# Patient Record
Sex: FEMALE | Race: WHITE | NOT HISPANIC OR LATINO | Employment: OTHER | URBAN - METROPOLITAN AREA
[De-identification: names, ages, dates, MRNs, and addresses within clinical notes are randomized per-mention and may not be internally consistent; named-entity substitution may affect disease eponyms.]

---

## 2018-01-03 ENCOUNTER — HOSPITAL ENCOUNTER (OUTPATIENT)
Dept: MAMMOGRAPHY | Facility: CLINIC | Age: 63
Discharge: HOME | End: 2018-01-03
Attending: OBSTETRICS & GYNECOLOGY | Admitting: OBSTETRICS & GYNECOLOGY
Payer: COMMERCIAL

## 2018-01-03 DIAGNOSIS — Z12.31 VISIT FOR SCREENING MAMMOGRAM: ICD-10-CM

## 2018-01-03 PROCEDURE — 77063 BREAST TOMOSYNTHESIS BI: CPT

## 2019-03-06 ENCOUNTER — HOSPITAL ENCOUNTER (OUTPATIENT)
Dept: MAMMOGRAPHY | Facility: CLINIC | Age: 64
Discharge: HOME | End: 2019-03-06
Attending: OBSTETRICS & GYNECOLOGY | Admitting: OBSTETRICS & GYNECOLOGY
Payer: MEDICARE

## 2019-03-06 DIAGNOSIS — Z12.31 VISIT FOR SCREENING MAMMOGRAM: ICD-10-CM

## 2019-03-06 PROCEDURE — 77063 BREAST TOMOSYNTHESIS BI: CPT

## 2019-07-31 ENCOUNTER — RECORDS - HEALTHEAST (OUTPATIENT)
Dept: ADMINISTRATIVE | Facility: OTHER | Age: 64
End: 2019-07-31

## 2019-08-01 ENCOUNTER — HOSPITAL ENCOUNTER (OUTPATIENT)
Dept: CT IMAGING | Facility: HOSPITAL | Age: 64
Discharge: HOME | End: 2019-08-01
Attending: INTERNAL MEDICINE

## 2019-08-01 DIAGNOSIS — R10.32 LLQ PAIN: ICD-10-CM

## 2019-08-01 LAB
CREAT BLD-MCNC: 0.8 MG/DL (ref 0.6–1.1)
GFR SERPL CREATININE-BSD FRML MDRD: >60 ML/MIN/1.73M2

## 2019-08-07 ENCOUNTER — RECORDS - HEALTHEAST (OUTPATIENT)
Dept: ADMINISTRATIVE | Facility: OTHER | Age: 64
End: 2019-08-07

## 2019-08-08 ENCOUNTER — HOSPITAL ENCOUNTER (OUTPATIENT)
Dept: ULTRASOUND IMAGING | Facility: HOSPITAL | Age: 64
Discharge: HOME | End: 2019-08-08
Attending: INTERNAL MEDICINE

## 2019-08-08 DIAGNOSIS — R10.32 LLQ PAIN: ICD-10-CM

## 2019-08-16 ENCOUNTER — HOSPITAL ENCOUNTER (OUTPATIENT)
Dept: MRI IMAGING | Facility: HOSPITAL | Age: 64
Discharge: HOME | End: 2019-08-16
Attending: INTERNAL MEDICINE

## 2019-08-16 DIAGNOSIS — R10.32 LLQ PAIN: ICD-10-CM

## 2019-09-16 ENCOUNTER — RECORDS - HEALTHEAST (OUTPATIENT)
Dept: ADMINISTRATIVE | Facility: OTHER | Age: 64
End: 2019-09-16

## 2019-09-16 ENCOUNTER — HOSPITAL ENCOUNTER (OUTPATIENT)
Dept: CT IMAGING | Facility: HOSPITAL | Age: 64
Discharge: HOME | End: 2019-09-16
Attending: INTERNAL MEDICINE

## 2019-09-16 DIAGNOSIS — R10.32 LLQ PAIN: ICD-10-CM

## 2019-09-16 LAB
CREAT BLD-MCNC: 0.7 MG/DL (ref 0.6–1.1)
GFR SERPL CREATININE-BSD FRML MDRD: >60 ML/MIN/1.73M2

## 2020-03-30 ENCOUNTER — RECORDS - HEALTHEAST (OUTPATIENT)
Dept: ADMINISTRATIVE | Facility: OTHER | Age: 65
End: 2020-03-30

## 2020-05-07 ENCOUNTER — COMMUNICATION - HEALTHEAST (OUTPATIENT)
Dept: INTERNAL MEDICINE | Facility: CLINIC | Age: 65
End: 2020-05-07

## 2020-05-11 ENCOUNTER — OFFICE VISIT - HEALTHEAST (OUTPATIENT)
Dept: INTERNAL MEDICINE | Facility: CLINIC | Age: 65
End: 2020-05-11

## 2020-05-11 DIAGNOSIS — M85.80 OSTEOPENIA, UNSPECIFIED LOCATION: ICD-10-CM

## 2020-05-11 DIAGNOSIS — E78.5 HYPERLIPIDEMIA LDL GOAL <130: ICD-10-CM

## 2020-05-11 ASSESSMENT — PATIENT HEALTH QUESTIONNAIRE - PHQ9: SUM OF ALL RESPONSES TO PHQ QUESTIONS 1-9: 0

## 2020-05-26 ENCOUNTER — NURSE TRIAGE (OUTPATIENT)
Dept: NURSING | Facility: CLINIC | Age: 65
End: 2020-05-26

## 2020-05-26 ENCOUNTER — COMMUNICATION - HEALTHEAST (OUTPATIENT)
Dept: SCHEDULING | Facility: CLINIC | Age: 65
End: 2020-05-26

## 2020-05-26 ENCOUNTER — OFFICE VISIT - HEALTHEAST (OUTPATIENT)
Dept: INTERNAL MEDICINE | Facility: CLINIC | Age: 65
End: 2020-05-26

## 2020-05-26 DIAGNOSIS — J02.9 SORE THROAT: ICD-10-CM

## 2020-05-26 DIAGNOSIS — R50.9 FEVER, UNSPECIFIED FEVER CAUSE: ICD-10-CM

## 2020-05-26 NOTE — TELEPHONE ENCOUNTER
"Pt states \"feeling generally ill.\"  \"Cold symptoms with body aches and sore throat.\"  \"Mild occasional chills.\"  Originally onset 5 days ago.  Fever -> \"which comes and goes.\"  Fever yesterday -> 99 (tympanic thermometer).  No fever today.  Other symptoms persist today (body aches and sore throat).    Pt requests order for PCR swab test for covid19.  Pt reports having a HE provider.  Therefore Charting is completed within HE instance of Epic.  See pt's complete triage notes in HE instance of Epic.    Tamra MEJÍA Health Nurse Advisor           "

## 2020-05-27 ENCOUNTER — AMBULATORY - HEALTHEAST (OUTPATIENT)
Dept: FAMILY MEDICINE | Facility: CLINIC | Age: 65
End: 2020-05-27

## 2020-05-27 ENCOUNTER — COMMUNICATION - HEALTHEAST (OUTPATIENT)
Dept: INTERNAL MEDICINE | Facility: CLINIC | Age: 65
End: 2020-05-27

## 2020-05-27 DIAGNOSIS — R50.9 FEVER, UNSPECIFIED FEVER CAUSE: ICD-10-CM

## 2020-05-27 DIAGNOSIS — J02.9 SORE THROAT: ICD-10-CM

## 2020-05-28 ENCOUNTER — AMBULATORY - HEALTHEAST (OUTPATIENT)
Dept: INTERNAL MEDICINE | Facility: CLINIC | Age: 65
End: 2020-05-28

## 2020-05-28 DIAGNOSIS — J02.9 SORE THROAT: ICD-10-CM

## 2020-05-29 ENCOUNTER — OFFICE VISIT - HEALTHEAST (OUTPATIENT)
Dept: FAMILY MEDICINE | Facility: CLINIC | Age: 65
End: 2020-05-29

## 2020-05-29 ENCOUNTER — COMMUNICATION - HEALTHEAST (OUTPATIENT)
Dept: FAMILY MEDICINE | Facility: CLINIC | Age: 65
End: 2020-05-29

## 2020-05-29 DIAGNOSIS — J02.9 SORE THROAT: ICD-10-CM

## 2020-05-29 DIAGNOSIS — J02.9 VIRAL PHARYNGITIS: ICD-10-CM

## 2020-05-29 LAB — DEPRECATED S PYO AG THROAT QL EIA: NORMAL

## 2020-05-30 LAB — GROUP A STREP BY PCR: NORMAL

## 2020-06-08 ENCOUNTER — COMMUNICATION - HEALTHEAST (OUTPATIENT)
Dept: INTERNAL MEDICINE | Facility: CLINIC | Age: 65
End: 2020-06-08

## 2020-06-11 ENCOUNTER — COMMUNICATION - HEALTHEAST (OUTPATIENT)
Dept: INTERNAL MEDICINE | Facility: CLINIC | Age: 65
End: 2020-06-11

## 2020-06-15 ENCOUNTER — OFFICE VISIT - HEALTHEAST (OUTPATIENT)
Dept: INTERNAL MEDICINE | Facility: CLINIC | Age: 65
End: 2020-06-15

## 2020-06-15 DIAGNOSIS — J02.9 SORE THROAT: ICD-10-CM

## 2020-06-15 ASSESSMENT — MIFFLIN-ST. JEOR: SCORE: 1265.03

## 2020-07-13 ENCOUNTER — HOSPITAL ENCOUNTER (OUTPATIENT)
Dept: MAMMOGRAPHY | Facility: CLINIC | Age: 65
Discharge: HOME | End: 2020-07-13
Attending: OBSTETRICS & GYNECOLOGY | Admitting: OBSTETRICS & GYNECOLOGY
Payer: MEDICARE

## 2020-07-13 DIAGNOSIS — Z12.31 VISIT FOR SCREENING MAMMOGRAM: ICD-10-CM

## 2020-07-13 PROCEDURE — 77063 BREAST TOMOSYNTHESIS BI: CPT

## 2020-10-05 ENCOUNTER — COMMUNICATION - HEALTHEAST (OUTPATIENT)
Dept: INTERNAL MEDICINE | Facility: CLINIC | Age: 65
End: 2020-10-05

## 2020-10-06 ENCOUNTER — OFFICE VISIT - HEALTHEAST (OUTPATIENT)
Dept: INTERNAL MEDICINE | Facility: CLINIC | Age: 65
End: 2020-10-06

## 2020-10-06 DIAGNOSIS — Z00.00 WELLNESS EXAMINATION: ICD-10-CM

## 2020-10-06 DIAGNOSIS — Z23 NEEDS FLU SHOT: ICD-10-CM

## 2020-10-06 DIAGNOSIS — E78.5 HYPERLIPIDEMIA LDL GOAL <130: ICD-10-CM

## 2020-10-06 DIAGNOSIS — Z78.0 MENOPAUSE: ICD-10-CM

## 2020-10-06 DIAGNOSIS — M79.10 MYALGIA: ICD-10-CM

## 2020-10-06 DIAGNOSIS — L98.9 SKIN LESION: ICD-10-CM

## 2020-10-06 DIAGNOSIS — R63.5 WEIGHT GAIN: ICD-10-CM

## 2020-10-06 LAB
ALBUMIN SERPL-MCNC: 4.6 G/DL (ref 3.5–5)
ALP SERPL-CCNC: 73 U/L (ref 45–120)
ALT SERPL W P-5'-P-CCNC: 15 U/L (ref 0–45)
ANION GAP SERPL CALCULATED.3IONS-SCNC: 7 MMOL/L (ref 5–18)
AST SERPL W P-5'-P-CCNC: 20 U/L (ref 0–40)
BILIRUB SERPL-MCNC: 0.4 MG/DL (ref 0–1)
BUN SERPL-MCNC: 15 MG/DL (ref 8–22)
C REACTIVE PROTEIN LHE: 0.1 MG/DL (ref 0–0.8)
CALCIUM SERPL-MCNC: 9.6 MG/DL (ref 8.5–10.5)
CHLORIDE BLD-SCNC: 103 MMOL/L (ref 98–107)
CHOLEST SERPL-MCNC: 205 MG/DL
CK SERPL-CCNC: 106 U/L (ref 30–190)
CO2 SERPL-SCNC: 29 MMOL/L (ref 22–31)
CREAT SERPL-MCNC: 0.8 MG/DL (ref 0.6–1.1)
ERYTHROCYTE [DISTWIDTH] IN BLOOD BY AUTOMATED COUNT: 13.1 % (ref 11–14.5)
ERYTHROCYTE [SEDIMENTATION RATE] IN BLOOD BY WESTERGREN METHOD: 14 MM/HR (ref 0–20)
FASTING STATUS PATIENT QL REPORTED: YES
GFR SERPL CREATININE-BSD FRML MDRD: >60 ML/MIN/1.73M2
GLUCOSE BLD-MCNC: 106 MG/DL (ref 70–125)
HCT VFR BLD AUTO: 41.6 % (ref 35–47)
HDLC SERPL-MCNC: 59 MG/DL
HGB BLD-MCNC: 13.9 G/DL (ref 12–16)
LDLC SERPL CALC-MCNC: 127 MG/DL
MCH RBC QN AUTO: 30 PG (ref 27–34)
MCHC RBC AUTO-ENTMCNC: 33.3 G/DL (ref 32–36)
MCV RBC AUTO: 90 FL (ref 80–100)
PLATELET # BLD AUTO: 224 THOU/UL (ref 140–440)
PMV BLD AUTO: 7.6 FL (ref 7–10)
POTASSIUM BLD-SCNC: 4.2 MMOL/L (ref 3.5–5)
PROT SERPL-MCNC: 7.5 G/DL (ref 6–8)
RBC # BLD AUTO: 4.63 MILL/UL (ref 3.8–5.4)
SODIUM SERPL-SCNC: 139 MMOL/L (ref 136–145)
TRIGL SERPL-MCNC: 96 MG/DL
TSH SERPL DL<=0.005 MIU/L-ACNC: 3.05 UIU/ML (ref 0.3–5)
WBC: 4.5 THOU/UL (ref 4–11)

## 2020-10-06 ASSESSMENT — PATIENT HEALTH QUESTIONNAIRE - PHQ9: SUM OF ALL RESPONSES TO PHQ QUESTIONS 1-9: 0

## 2020-10-06 ASSESSMENT — MIFFLIN-ST. JEOR: SCORE: 1255.96

## 2020-10-07 ENCOUNTER — OFFICE VISIT (OUTPATIENT)
Dept: URGENT CARE | Facility: URGENT CARE | Age: 65
End: 2020-10-07
Payer: MEDICARE

## 2020-10-07 ENCOUNTER — COMMUNICATION - HEALTHEAST (OUTPATIENT)
Dept: SCHEDULING | Facility: CLINIC | Age: 65
End: 2020-10-07

## 2020-10-07 VITALS
OXYGEN SATURATION: 98 % | WEIGHT: 153 LBS | DIASTOLIC BLOOD PRESSURE: 76 MMHG | TEMPERATURE: 97.9 F | HEART RATE: 64 BPM | SYSTOLIC BLOOD PRESSURE: 125 MMHG

## 2020-10-07 DIAGNOSIS — L03.114 CELLULITIS OF LEFT UPPER ARM: Primary | ICD-10-CM

## 2020-10-07 PROCEDURE — 99203 OFFICE O/P NEW LOW 30 MIN: CPT | Performed by: INTERNAL MEDICINE

## 2020-10-07 RX ORDER — CEPHALEXIN 500 MG/1
500 CAPSULE ORAL 3 TIMES DAILY
Qty: 21 CAPSULE | Refills: 0 | Status: SHIPPED | OUTPATIENT
Start: 2020-10-07 | End: 2021-07-18

## 2020-10-07 RX ORDER — LATANOPROST 50 UG/ML
SOLUTION/ DROPS OPHTHALMIC
COMMUNITY
Start: 2020-08-28

## 2020-10-07 RX ORDER — VALACYCLOVIR HYDROCHLORIDE 500 MG/1
TABLET, FILM COATED ORAL
COMMUNITY
Start: 2020-07-22 | End: 2021-07-14

## 2020-10-07 RX ORDER — CYANOCOBALAMIN 1000 UG/ML
1000 INJECTION, SOLUTION INTRAMUSCULAR; SUBCUTANEOUS
COMMUNITY
Start: 2020-03-30 | End: 2022-07-07

## 2020-10-07 RX ORDER — ESTRADIOL 0.1 MG/G
2 CREAM VAGINAL
COMMUNITY
Start: 2020-03-30 | End: 2021-07-14

## 2020-10-07 RX ORDER — CEPHALEXIN 500 MG/1
500 CAPSULE ORAL 3 TIMES DAILY
Qty: 21 CAPSULE | Refills: 0 | Status: SHIPPED | OUTPATIENT
Start: 2020-10-07 | End: 2020-10-07

## 2020-10-07 RX ORDER — PRAVASTATIN SODIUM 20 MG
20 TABLET ORAL
COMMUNITY
Start: 2020-04-02 | End: 2022-05-03

## 2020-10-07 ASSESSMENT — ENCOUNTER SYMPTOMS
MYALGIAS: 0
HEADACHES: 0

## 2020-10-08 ENCOUNTER — COMMUNICATION - HEALTHEAST (OUTPATIENT)
Dept: SCHEDULING | Facility: CLINIC | Age: 65
End: 2020-10-08

## 2020-10-08 ENCOUNTER — OFFICE VISIT - HEALTHEAST (OUTPATIENT)
Dept: INTERNAL MEDICINE | Facility: CLINIC | Age: 65
End: 2020-10-08

## 2020-10-08 ENCOUNTER — AMBULATORY - HEALTHEAST (OUTPATIENT)
Dept: INTERNAL MEDICINE | Facility: CLINIC | Age: 65
End: 2020-10-08

## 2020-10-08 ENCOUNTER — COMMUNICATION - HEALTHEAST (OUTPATIENT)
Dept: INTERNAL MEDICINE | Facility: CLINIC | Age: 65
End: 2020-10-08

## 2020-10-08 DIAGNOSIS — I82.602 THROMBOSIS OF LEFT UPPER EXTREMITY: ICD-10-CM

## 2020-10-08 DIAGNOSIS — R22.32 LOCALIZED SWELLING, MASS AND LUMP, LEFT UPPER LIMB: ICD-10-CM

## 2020-10-08 NOTE — PROGRESS NOTES
"SUBJECTIVE:   Francisca Williamson is a 65 year old female presenting with a chief complaint of   Chief Complaint   Patient presents with     Urgent Care     Allergic Reaction     reaction to flu shot       She is a new patient of [facility].        Onset of symptoms was last night  Course of illness is worsening.    Location: left forearm  Context: had flu shot yesterday at physical  Called nurse line tonight and recommended evaluation.  Current and Associated symptoms: redness, warmth, swelling and pain  Treatment measures tried include: tylenol  *    Review of Systems   Constitutional: Fever: 99.   HENT:        Getting over a cold   Musculoskeletal: Negative for myalgias.   Neurological: Negative for headaches.       Past Medical History:   Diagnosis Date     High cholesterol      No family history on file.  Current Outpatient Medications   Medication Sig Dispense Refill     B-D TB SYRINGE 25G X 5/8\" 1 ML MISC FOR HOME USE       cephALEXin (KEFLEX) 500 MG capsule Take 1 capsule (500 mg) by mouth 3 times daily 21 capsule 0     cyanocobalamin (CYANOCOBALAMIN) 1000 MCG/ML injection Inject 1,000 mcg into the muscle       estradiol (ESTRACE) 0.1 MG/GM vaginal cream Place 2 g vaginally       latanoprost (XALATAN) 0.005 % ophthalmic solution INT 1 GTT IN EACH EYE ONCE D       pravastatin (PRAVACHOL) 20 MG tablet Take 20 mg by mouth       valACYclovir (VALTREX) 500 MG tablet        Social History     Tobacco Use     Smoking status: Never Smoker     Smokeless tobacco: Never Used   Substance Use Topics     Alcohol use: Not on file       OBJECTIVE  /76   Pulse 64   Temp 97.9  F (36.6  C) (Tympanic)   Wt 69.4 kg (153 lb)   SpO2 98%     Physical Exam  Vitals signs reviewed.   Constitutional:       Appearance: Normal appearance. She is not ill-appearing, toxic-appearing or diaphoretic.   Skin:     Comments: right arm    Red area noted    16 x 7 cm  Superior area more light red and transitions to deep red 1/2 way down with " more prominent swelling.  Warm to touch   Neurological:      Mental Status: She is alert.                 Labs:  No results found for this or any previous visit (from the past 24 hour(s)).        ASSESSMENT:      ICD-10-CM    1. Cellulitis of left upper arm  L03.114 cephALEXin (KEFLEX) 500 MG capsule     DISCONTINUED: cephALEXin (KEFLEX) 500 MG capsule        PLAN:  Keflex antibiotics 500 mg 3 times a day for 1 week  Recommended probiotics  Elevate  Limit use of arm      Followup:    If not improving or if condition worsens, follow up with your Primary Care Provider, 1 week    Patient Instructions   If worse, spreading or other concerns, may need IV antibiotics / ER visit.        Patient Education     Cellulitis  Cellulitis is an infection of the deep layers of skin. A break in the skin, such as a cut or scratch, can let bacteria under the skin. If the bacteria get to deep layers of the skin, it can be serious. If not treated, cellulitis can get into the bloodstream and lymph nodes. The infection can then spread throughout the body. This causes serious illness.  Cellulitis causes the affected skin to become red, swollen, warm, and sore. The reddened areas have a visible border. An open sore may leak fluid (pus). You may have a fever, chills, and pain.  Cellulitis is treated with antibiotics taken for 7 to 10 days. An open sore may be cleaned and covered with cool wet gauze. Symptoms should get better 1 to 2 days after treatment is started. Make sure to take all the antibiotics for the full number of days until they are gone. Keep taking the medicine even if your symptoms go away.  Home care  Follow these tips:    Limit the use of the part of your body with cellulitis.     If the infection is on your leg, keep your leg raised while sitting. This will help to reduce swelling.    Take all of the antibiotic medicine exactly as directed until it is gone. Do not miss any doses, especially during the first 7 days. Don t  stop taking the medicine when your symptoms get better.    Keep the affected area clean and dry.    Wash your hands with soap and warm water before and after touching your skin. Anyone else who touches your skin should also wash his or her hands. Don't share towels.  Follow-up care  Follow up with your healthcare provider, or as advised. If your infection does not go away on the first antibiotic, your healthcare provider will prescribe a different one.  When to seek medical advice  Call your healthcare provider right away if any of these occur:    Red areas that spread    Swelling or pain that gets worse    Fluid leaking from the skin (pus)    Fever higher of 100.4  F (38.0  C) or higher after 2 days on antibiotics  Date Last Reviewed: 9/1/2016 2000-2019 [employer]. [address] [place] [address], [place] [address]. All rights reserved. This information is not intended as a substitute for professional medical care. Always follow your healthcare professional's instructions.

## 2020-10-08 NOTE — PATIENT INSTRUCTIONS
If worse, spreading or other concerns, may need IV antibiotics / ER visit.        Patient Education     Cellulitis  Cellulitis is an infection of the deep layers of skin. A break in the skin, such as a cut or scratch, can let bacteria under the skin. If the bacteria get to deep layers of the skin, it can be serious. If not treated, cellulitis can get into the bloodstream and lymph nodes. The infection can then spread throughout the body. This causes serious illness.  Cellulitis causes the affected skin to become red, swollen, warm, and sore. The reddened areas have a visible border. An open sore may leak fluid (pus). You may have a fever, chills, and pain.  Cellulitis is treated with antibiotics taken for 7 to 10 days. An open sore may be cleaned and covered with cool wet gauze. Symptoms should get better 1 to 2 days after treatment is started. Make sure to take all the antibiotics for the full number of days until they are gone. Keep taking the medicine even if your symptoms go away.  Home care  Follow these tips:    Limit the use of the part of your body with cellulitis.     If the infection is on your leg, keep your leg raised while sitting. This will help to reduce swelling.    Take all of the antibiotic medicine exactly as directed until it is gone. Do not miss any doses, especially during the first 7 days. Don t stop taking the medicine when your symptoms get better.    Keep the affected area clean and dry.    Wash your hands with soap and warm water before and after touching your skin. Anyone else who touches your skin should also wash his or her hands. Don't share towels.  Follow-up care  Follow up with your healthcare provider, or as advised. If your infection does not go away on the first antibiotic, your healthcare provider will prescribe a different one.  When to seek medical advice  Call your healthcare provider right away if any of these occur:    Red areas that spread    Swelling or pain that gets  worse    Fluid leaking from the skin (pus)    Fever higher of 100.4  F (38.0  C) or higher after 2 days on antibiotics  Date Last Reviewed: 9/1/2016 2000-2019 [employer]. [address] [place] [address], [place] [address]. All rights reserved. This information is not intended as a substitute for professional medical care. Always follow your healthcare professional's instructions.

## 2020-10-09 ENCOUNTER — COMMUNICATION - HEALTHEAST (OUTPATIENT)
Dept: SCHEDULING | Facility: CLINIC | Age: 65
End: 2020-10-09

## 2020-10-09 ENCOUNTER — COMMUNICATION - HEALTHEAST (OUTPATIENT)
Dept: INTERNAL MEDICINE | Facility: CLINIC | Age: 65
End: 2020-10-09

## 2020-10-09 ENCOUNTER — OFFICE VISIT - HEALTHEAST (OUTPATIENT)
Dept: INTERNAL MEDICINE | Facility: CLINIC | Age: 65
End: 2020-10-09

## 2020-10-09 DIAGNOSIS — I82.602 THROMBOSIS OF LEFT UPPER EXTREMITY: ICD-10-CM

## 2020-10-09 DIAGNOSIS — L03.90 CELLULITIS, UNSPECIFIED CELLULITIS SITE: ICD-10-CM

## 2020-10-09 ASSESSMENT — MIFFLIN-ST. JEOR: SCORE: 1255.96

## 2020-10-12 ENCOUNTER — COMMUNICATION - HEALTHEAST (OUTPATIENT)
Dept: INTERNAL MEDICINE | Facility: CLINIC | Age: 65
End: 2020-10-12

## 2020-10-15 ENCOUNTER — COMMUNICATION - HEALTHEAST (OUTPATIENT)
Dept: INTERNAL MEDICINE | Facility: CLINIC | Age: 65
End: 2020-10-15

## 2020-10-25 ENCOUNTER — COMMUNICATION - HEALTHEAST (OUTPATIENT)
Dept: INTERNAL MEDICINE | Facility: CLINIC | Age: 65
End: 2020-10-25

## 2020-10-26 ENCOUNTER — RECORDS - HEALTHEAST (OUTPATIENT)
Dept: ADMINISTRATIVE | Facility: OTHER | Age: 65
End: 2020-10-26

## 2020-10-27 ENCOUNTER — COMMUNICATION - HEALTHEAST (OUTPATIENT)
Dept: SCHEDULING | Facility: CLINIC | Age: 65
End: 2020-10-27

## 2020-10-27 DIAGNOSIS — R52 BODY ACHES: ICD-10-CM

## 2020-10-27 DIAGNOSIS — R09.81 CONGESTION OF PARANASAL SINUS: ICD-10-CM

## 2020-10-30 ENCOUNTER — AMBULATORY - HEALTHEAST (OUTPATIENT)
Dept: FAMILY MEDICINE | Facility: CLINIC | Age: 65
End: 2020-10-30

## 2020-10-30 DIAGNOSIS — R09.81 CONGESTION OF PARANASAL SINUS: ICD-10-CM

## 2020-10-30 DIAGNOSIS — R52 BODY ACHES: ICD-10-CM

## 2020-11-01 ENCOUNTER — COMMUNICATION - HEALTHEAST (OUTPATIENT)
Dept: SCHEDULING | Facility: CLINIC | Age: 65
End: 2020-11-01

## 2020-11-12 ENCOUNTER — COMMUNICATION - HEALTHEAST (OUTPATIENT)
Dept: INTERNAL MEDICINE | Facility: CLINIC | Age: 65
End: 2020-11-12

## 2020-11-16 ENCOUNTER — COMMUNICATION - HEALTHEAST (OUTPATIENT)
Dept: INTERNAL MEDICINE | Facility: CLINIC | Age: 65
End: 2020-11-16

## 2020-11-19 ENCOUNTER — OFFICE VISIT - HEALTHEAST (OUTPATIENT)
Dept: INTERNAL MEDICINE | Facility: CLINIC | Age: 65
End: 2020-11-19

## 2020-11-19 DIAGNOSIS — K57.32 DIVERTICULITIS OF COLON: ICD-10-CM

## 2020-11-19 ASSESSMENT — MIFFLIN-ST. JEOR: SCORE: 1278.64

## 2020-11-20 ENCOUNTER — HOSPITAL ENCOUNTER (OUTPATIENT)
Dept: CARDIOLOGY | Facility: HOSPITAL | Age: 65
Discharge: HOME | End: 2020-11-20

## 2020-11-20 DIAGNOSIS — R07.9 CHEST PAIN, UNSPECIFIED TYPE: ICD-10-CM

## 2020-11-20 LAB
CV STRESS CURRENT BP HE: NORMAL
CV STRESS CURRENT HR HE: 119
CV STRESS CURRENT HR HE: 124
CV STRESS CURRENT HR HE: 129
CV STRESS CURRENT HR HE: 130
CV STRESS CURRENT HR HE: 133
CV STRESS CURRENT HR HE: 134
CV STRESS CURRENT HR HE: 137
CV STRESS CURRENT HR HE: 141
CV STRESS CURRENT HR HE: 142
CV STRESS CURRENT HR HE: 75
CV STRESS CURRENT HR HE: 76
CV STRESS CURRENT HR HE: 78
CV STRESS CURRENT HR HE: 81
CV STRESS CURRENT HR HE: 85
CV STRESS CURRENT HR HE: 86
CV STRESS CURRENT HR HE: 87
CV STRESS CURRENT HR HE: 91
CV STRESS CURRENT HR HE: 91
CV STRESS CURRENT HR HE: 92
CV STRESS DEVIATION TIME HE: NORMAL
CV STRESS ECHO PERCENT HR HE: NORMAL
CV STRESS EXERCISE STAGE HE: NORMAL
CV STRESS FINAL RESTING BP HE: NORMAL
CV STRESS FINAL RESTING HR HE: 87
CV STRESS MAX HR HE: 141
CV STRESS MAX TREADMILL GRADE HE: 12
CV STRESS MAX TREADMILL SPEED HE: 2.5
CV STRESS PEAK DIA BP HE: NORMAL
CV STRESS PEAK SYS BP HE: NORMAL
CV STRESS PHASE HE: NORMAL
CV STRESS PROTOCOL HE: NORMAL
CV STRESS RESTING PT POSITION HE: NORMAL
CV STRESS RESTING PT POSITION HE: NORMAL
CV STRESS ST DEVIATION AMOUNT HE: NORMAL
CV STRESS ST DEVIATION ELEVATION HE: NORMAL
CV STRESS ST EVELATION AMOUNT HE: NORMAL
CV STRESS TEST TYPE HE: NORMAL
CV STRESS TOTAL STAGE TIME MIN 1 HE: NORMAL
ECHO EJECTION FRACTION ESTIMATED: 65 %
RATE PRESSURE PRODUCT: NORMAL
STRESS ECHO BASELINE DIASTOLIC HE: 76
STRESS ECHO BASELINE HR: 85
STRESS ECHO BASELINE SYSTOLIC BP: 120
STRESS ECHO CALCULATED PERCENT HR: 92 %
STRESS ECHO LAST STRESS DIASTOLIC BP: 70
STRESS ECHO LAST STRESS HR: 142
STRESS ECHO LAST STRESS SYSTOLIC BP: 152
STRESS ECHO POST ESTIMATED WORKLOAD: 7.1
STRESS ECHO POST EXERCISE DUR MIN: 5
STRESS ECHO POST EXERCISE DUR SEC: 58
STRESS ECHO TARGET HR: 153

## 2020-11-24 ENCOUNTER — COMMUNICATION - HEALTHEAST (OUTPATIENT)
Dept: INTERNAL MEDICINE | Facility: CLINIC | Age: 65
End: 2020-11-24

## 2020-12-17 ENCOUNTER — COMMUNICATION - HEALTHEAST (OUTPATIENT)
Dept: INTERNAL MEDICINE | Facility: CLINIC | Age: 65
End: 2020-12-17

## 2020-12-17 DIAGNOSIS — K90.0 CELIAC DISEASE/SPRUE: ICD-10-CM

## 2020-12-18 ENCOUNTER — COMMUNICATION - HEALTHEAST (OUTPATIENT)
Dept: INTERNAL MEDICINE | Facility: CLINIC | Age: 65
End: 2020-12-18

## 2020-12-18 DIAGNOSIS — R10.84 ABDOMINAL PAIN, GENERALIZED: ICD-10-CM

## 2020-12-21 ENCOUNTER — HOSPITAL ENCOUNTER (OUTPATIENT)
Dept: CT IMAGING | Facility: HOSPITAL | Age: 65
Discharge: HOME | End: 2020-12-21

## 2020-12-21 ENCOUNTER — AMBULATORY - HEALTHEAST (OUTPATIENT)
Dept: LAB | Facility: HOSPITAL | Age: 65
End: 2020-12-21

## 2020-12-21 DIAGNOSIS — R10.84 ABDOMINAL PAIN, GENERALIZED: ICD-10-CM

## 2020-12-21 LAB
ALBUMIN SERPL-MCNC: 4.4 G/DL (ref 3.5–5)
ALBUMIN UR-MCNC: NEGATIVE MG/DL
ALP SERPL-CCNC: 69 U/L (ref 45–120)
ALT SERPL W P-5'-P-CCNC: 20 U/L (ref 0–45)
AMYLASE SERPL-CCNC: 42 U/L (ref 5–120)
ANION GAP SERPL CALCULATED.3IONS-SCNC: 7 MMOL/L (ref 5–18)
APPEARANCE UR: CLEAR
AST SERPL W P-5'-P-CCNC: 26 U/L (ref 0–40)
BILIRUB DIRECT SERPL-MCNC: <0.1 MG/DL
BILIRUB SERPL-MCNC: 0.3 MG/DL (ref 0–1)
BILIRUB UR QL STRIP: NEGATIVE
BUN SERPL-MCNC: 13 MG/DL (ref 8–22)
CALCIUM SERPL-MCNC: 9.1 MG/DL (ref 8.5–10.5)
CHLORIDE BLD-SCNC: 103 MMOL/L (ref 98–107)
CO2 SERPL-SCNC: 26 MMOL/L (ref 22–31)
COLOR UR AUTO: COLORLESS
CREAT SERPL-MCNC: 0.82 MG/DL (ref 0.6–1.1)
ERYTHROCYTE [DISTWIDTH] IN BLOOD BY AUTOMATED COUNT: 13.4 % (ref 11–14.5)
GFR SERPL CREATININE-BSD FRML MDRD: >60 ML/MIN/1.73M2
GLUCOSE BLD-MCNC: 149 MG/DL (ref 70–125)
GLUCOSE UR STRIP-MCNC: NEGATIVE MG/DL
HCT VFR BLD AUTO: 40.1 % (ref 35–47)
HGB BLD-MCNC: 12.9 G/DL (ref 12–16)
HGB UR QL STRIP: NEGATIVE
KETONES UR STRIP-MCNC: NEGATIVE MG/DL
LEUKOCYTE ESTERASE UR QL STRIP: NEGATIVE
LIPASE SERPL-CCNC: 48 U/L (ref 0–52)
MCH RBC QN AUTO: 29 PG (ref 27–34)
MCHC RBC AUTO-ENTMCNC: 32.2 G/DL (ref 32–36)
MCV RBC AUTO: 90 FL (ref 80–100)
NITRATE UR QL: NEGATIVE
PH UR STRIP: 6 [PH] (ref 4.5–8)
PLATELET # BLD AUTO: 239 THOU/UL (ref 140–440)
PMV BLD AUTO: 9.5 FL (ref 8.5–12.5)
POTASSIUM BLD-SCNC: 3.8 MMOL/L (ref 3.5–5)
PROT SERPL-MCNC: 7.7 G/DL (ref 6–8)
RBC # BLD AUTO: 4.45 MILL/UL (ref 3.8–5.4)
SODIUM SERPL-SCNC: 136 MMOL/L (ref 136–145)
SP GR UR STRIP: 1 (ref 1–1.03)
UROBILINOGEN UR STRIP-ACNC: NORMAL
WBC: 7.3 THOU/UL (ref 4–11)

## 2020-12-22 ENCOUNTER — COMMUNICATION - HEALTHEAST (OUTPATIENT)
Dept: INTERNAL MEDICINE | Facility: CLINIC | Age: 65
End: 2020-12-22

## 2020-12-25 ENCOUNTER — OFFICE VISIT - HEALTHEAST (OUTPATIENT)
Dept: FAMILY MEDICINE | Facility: CLINIC | Age: 65
End: 2020-12-25

## 2020-12-25 DIAGNOSIS — R30.0 BURNING WITH URINATION: ICD-10-CM

## 2020-12-25 DIAGNOSIS — R39.9 UTI SYMPTOMS: ICD-10-CM

## 2020-12-25 LAB
ALBUMIN UR-MCNC: NEGATIVE MG/DL
APPEARANCE UR: CLEAR
BACTERIA #/AREA URNS HPF: ABNORMAL HPF
BILIRUB UR QL STRIP: NEGATIVE
CLUE CELLS: NORMAL
COLOR UR AUTO: YELLOW
GLUCOSE UR STRIP-MCNC: NEGATIVE MG/DL
HGB UR QL STRIP: ABNORMAL
KETONES UR STRIP-MCNC: NEGATIVE MG/DL
LEUKOCYTE ESTERASE UR QL STRIP: NEGATIVE
NITRATE UR QL: NEGATIVE
PH UR STRIP: 5.5 [PH] (ref 5–8)
RBC #/AREA URNS AUTO: ABNORMAL HPF
SP GR UR STRIP: 1.01 (ref 1–1.03)
SQUAMOUS #/AREA URNS AUTO: ABNORMAL LPF
TRICHOMONAS, WET PREP: NORMAL
UROBILINOGEN UR STRIP-ACNC: ABNORMAL
WBC #/AREA URNS AUTO: ABNORMAL HPF
YEAST, WET PREP: NORMAL

## 2021-01-12 ENCOUNTER — RECORDS - HEALTHEAST (OUTPATIENT)
Dept: ADMINISTRATIVE | Facility: OTHER | Age: 66
End: 2021-01-12

## 2021-01-13 ENCOUNTER — RECORDS - HEALTHEAST (OUTPATIENT)
Dept: ADMINISTRATIVE | Facility: OTHER | Age: 66
End: 2021-01-13

## 2021-01-18 ENCOUNTER — COMMUNICATION - HEALTHEAST (OUTPATIENT)
Dept: INTERNAL MEDICINE | Facility: CLINIC | Age: 66
End: 2021-01-18

## 2021-01-26 ENCOUNTER — RECORDS - HEALTHEAST (OUTPATIENT)
Dept: ADMINISTRATIVE | Facility: OTHER | Age: 66
End: 2021-01-26

## 2021-01-26 ENCOUNTER — RECORDS - HEALTHEAST (OUTPATIENT)
Dept: BONE DENSITY | Facility: CLINIC | Age: 66
End: 2021-01-26

## 2021-01-26 DIAGNOSIS — Z78.0 ASYMPTOMATIC MENOPAUSAL STATE: ICD-10-CM

## 2021-01-27 ENCOUNTER — COMMUNICATION - HEALTHEAST (OUTPATIENT)
Dept: INTERNAL MEDICINE | Facility: CLINIC | Age: 66
End: 2021-01-27

## 2021-01-28 ENCOUNTER — COMMUNICATION - HEALTHEAST (OUTPATIENT)
Dept: INTERNAL MEDICINE | Facility: CLINIC | Age: 66
End: 2021-01-28

## 2021-01-28 ENCOUNTER — RECORDS - HEALTHEAST (OUTPATIENT)
Dept: ADMINISTRATIVE | Facility: OTHER | Age: 66
End: 2021-01-28

## 2021-02-17 ENCOUNTER — RECORDS - HEALTHEAST (OUTPATIENT)
Dept: ADMINISTRATIVE | Facility: OTHER | Age: 66
End: 2021-02-17

## 2021-02-18 ENCOUNTER — COMMUNICATION - HEALTHEAST (OUTPATIENT)
Dept: INTERNAL MEDICINE | Facility: CLINIC | Age: 66
End: 2021-02-18

## 2021-03-08 ENCOUNTER — AMBULATORY - HEALTHEAST (OUTPATIENT)
Dept: NURSING | Facility: CLINIC | Age: 66
End: 2021-03-08

## 2021-03-29 ENCOUNTER — AMBULATORY - HEALTHEAST (OUTPATIENT)
Dept: NURSING | Facility: CLINIC | Age: 66
End: 2021-03-29

## 2021-04-08 ENCOUNTER — OFFICE VISIT - HEALTHEAST (OUTPATIENT)
Dept: FAMILY MEDICINE | Facility: CLINIC | Age: 66
End: 2021-04-08

## 2021-04-08 DIAGNOSIS — Z88.1 ALLERGY TO MULTIPLE ANTIBIOTICS: ICD-10-CM

## 2021-04-08 DIAGNOSIS — K90.0 CELIAC DISEASE/SPRUE: ICD-10-CM

## 2021-04-08 DIAGNOSIS — Z86.0100 HISTORY OF COLONIC POLYPS: ICD-10-CM

## 2021-04-08 DIAGNOSIS — M81.0 OSTEOPOROSIS, UNSPECIFIED OSTEOPOROSIS TYPE, UNSPECIFIED PATHOLOGICAL FRACTURE PRESENCE: ICD-10-CM

## 2021-04-08 DIAGNOSIS — K57.30 DIVERTICULOSIS OF LARGE INTESTINE WITHOUT HEMORRHAGE: ICD-10-CM

## 2021-04-08 DIAGNOSIS — E78.5 HYPERLIPIDEMIA LDL GOAL <130: ICD-10-CM

## 2021-04-08 DIAGNOSIS — E53.8 B12 DEFICIENCY: ICD-10-CM

## 2021-04-22 ENCOUNTER — RECORDS - HEALTHEAST (OUTPATIENT)
Dept: ADMINISTRATIVE | Facility: OTHER | Age: 66
End: 2021-04-22

## 2021-05-03 ENCOUNTER — OFFICE VISIT - HEALTHEAST (OUTPATIENT)
Dept: ALLERGY | Facility: CLINIC | Age: 66
End: 2021-05-03

## 2021-05-03 DIAGNOSIS — Z88.1 ALLERGY TO MULTIPLE ANTIBIOTICS: ICD-10-CM

## 2021-05-07 ENCOUNTER — AMBULATORY - HEALTHEAST (OUTPATIENT)
Dept: ALLERGY | Facility: CLINIC | Age: 66
End: 2021-05-07

## 2021-05-07 DIAGNOSIS — Z88.9 DRUG ALLERGY: ICD-10-CM

## 2021-05-13 ENCOUNTER — RECORDS - HEALTHEAST (OUTPATIENT)
Dept: ADMINISTRATIVE | Facility: OTHER | Age: 66
End: 2021-05-13

## 2021-05-13 ENCOUNTER — TRANSFERRED RECORDS (OUTPATIENT)
Dept: PHYSICAL THERAPY | Facility: CLINIC | Age: 66
End: 2021-05-13

## 2021-05-26 ASSESSMENT — PATIENT HEALTH QUESTIONNAIRE - PHQ9: SUM OF ALL RESPONSES TO PHQ QUESTIONS 1-9: 0

## 2021-05-27 VITALS
SYSTOLIC BLOOD PRESSURE: 108 MMHG | DIASTOLIC BLOOD PRESSURE: 70 MMHG | HEART RATE: 78 BPM | OXYGEN SATURATION: 97 % | TEMPERATURE: 98.4 F

## 2021-05-27 VITALS — TEMPERATURE: 98.6 F

## 2021-05-27 ASSESSMENT — PATIENT HEALTH QUESTIONNAIRE - PHQ9: SUM OF ALL RESPONSES TO PHQ QUESTIONS 1-9: 0

## 2021-06-04 VITALS
WEIGHT: 154.25 LBS | SYSTOLIC BLOOD PRESSURE: 122 MMHG | HEART RATE: 74 BPM | DIASTOLIC BLOOD PRESSURE: 81 MMHG | BODY MASS INDEX: 25.28 KG/M2 | OXYGEN SATURATION: 98 % | RESPIRATION RATE: 16 BRPM | TEMPERATURE: 97.8 F

## 2021-06-04 VITALS
BODY MASS INDEX: 25.55 KG/M2 | HEART RATE: 72 BPM | OXYGEN SATURATION: 97 % | DIASTOLIC BLOOD PRESSURE: 80 MMHG | SYSTOLIC BLOOD PRESSURE: 120 MMHG | TEMPERATURE: 98.2 F | WEIGHT: 159 LBS | HEIGHT: 66 IN

## 2021-06-05 VITALS
OXYGEN SATURATION: 97 % | TEMPERATURE: 97.4 F | DIASTOLIC BLOOD PRESSURE: 79 MMHG | SYSTOLIC BLOOD PRESSURE: 135 MMHG | HEART RATE: 74 BPM | BODY MASS INDEX: 25.73 KG/M2 | WEIGHT: 157 LBS

## 2021-06-05 VITALS
HEIGHT: 66 IN | WEIGHT: 162 LBS | HEART RATE: 78 BPM | OXYGEN SATURATION: 99 % | DIASTOLIC BLOOD PRESSURE: 70 MMHG | BODY MASS INDEX: 26.03 KG/M2 | SYSTOLIC BLOOD PRESSURE: 102 MMHG

## 2021-06-05 VITALS
HEART RATE: 73 BPM | SYSTOLIC BLOOD PRESSURE: 112 MMHG | WEIGHT: 157 LBS | OXYGEN SATURATION: 97 % | BODY MASS INDEX: 25.23 KG/M2 | DIASTOLIC BLOOD PRESSURE: 74 MMHG | HEIGHT: 66 IN

## 2021-06-05 VITALS
BODY MASS INDEX: 25.23 KG/M2 | OXYGEN SATURATION: 97 % | WEIGHT: 157 LBS | HEART RATE: 78 BPM | DIASTOLIC BLOOD PRESSURE: 70 MMHG | SYSTOLIC BLOOD PRESSURE: 130 MMHG | HEIGHT: 66 IN

## 2021-06-08 NOTE — TELEPHONE ENCOUNTER
Dr. Lewis,  Spoke with the patient and relayed message below from Dr. Mckeon.  Patient states that you had verbally told her to get a strep test, but one was not ordered.  She did get her Covid test today, but would be okay with going in again for a strep test if you would like it done.    Please advise.  Thank you.  Myranda CASTELAN CMA/MICHAEL....................4:02 PM

## 2021-06-08 NOTE — TELEPHONE ENCOUNTER
Left message to call back for: Francisca  Information to relay to patient:  Dr Li would like to move pt's appt up to May to establish care by telephone or video visit. Thanks.

## 2021-06-08 NOTE — TELEPHONE ENCOUNTER
"Pt states \"feeling generally ill.\"  Onset 5 days ago.    \"Cold symptoms with body aches and sore throat.\"  \"Mild occasional chills.\"  Fever -> \"comes and goes.\"  Fever yesterday -> 99 (tympanic thermometer).  No fever today.  Other symptoms persist today -> body aches and sore throat.    Pt would like PCR covid swab test ordered, due to high-risk household members.  Pt prefers telephone provider visit to discuss her request for PCR test order.  Warm transferred to a  for this purpose now.    Tamra Marshall  [facility] Nurse Advisor     Reason for Disposition    [1] COVID-19 infection diagnosed or suspected AND [2] mild symptoms (fever, cough) AND [3] no trouble breathing or other complications    [facility] Specific Disposition  - REQUIRED: [facility] Specific Patient Instructions  COVID 19 Nurse Triage Plan/Patient Instructions    Please be aware that novel coronavirus (COVID-19) may be circulating in the community. If you develop symptoms such as fever, cough, or SOB or if you have concerns about the presence of another infection including coronavirus (COVID-19), please contact your health care provider or visit [email or web address].       Patient to have scheduled Telephone Visit with a provider. Follow System Ambulatory Workflow for COVID 19.     The clinic staff will assist you to schedule an appointment to complete the Telephone Visit with a provider during normal clinic hours.       Call Back If: Your symptoms worsen before you are able to complete your Telephone Visit with a provider.      Thank you for limiting contact with others, wearing a simple mask to cover your cough, practice good hand hygiene habits and accessing our [employer] services where possible to limit the spread of this virus.    For more information about COVID19 and options for caring for yourself at home, please visit the CDC website at https://www.cdc.gov/coronavirus/2019-ncov/about/steps-when-sick.html  For more options for " care at [facility], please visit our website at [email or web address]    For more information, please use the [place] Department of Health ([facility]) COVID-19 Hotlines (Interpreters available):   Health questions: Phone Number: [phone] or [phone] and Hours: 7 a.m. to 7 p.m.  Schools and  questions: Phone Number: [phone] or [phone] and Hours 7 a.m. to 7 p.m.    Protocols used: CORONAVIRUS (COVID-19) DIAGNOSED OR [id number] 4.22.20

## 2021-06-08 NOTE — PROGRESS NOTES
"Francisca Williamson is a 64 y.o. female who is being evaluated via a billable video visit.      The patient has been notified of following:     \"This video visit will be conducted via a call between you and your physician/provider. We have found that certain health care needs can be provided without the need for an in-person physical exam.  This service lets us provide the care you need with a video conversation.  If a prescription is necessary we can send it directly to your pharmacy.  If lab work is needed we can place an order for that and you can then stop by our lab to have the test done at a later time.    Video visits are billed at different rates depending on your insurance coverage. Please reach out to your insurance provider with any questions.    If during the course of the call the physician/provider feels a video visit is not appropriate, you will not be charged for this service.\"    Patient has given verbal consent to a Video visit? Yes        Patient would like the video invitation sent by: Text to cell phone: [phone]    Will anyone else be joining your video visit? No        Video Start Time: 3:10p    Additional provider notes:     Office Visit - Follow Up   Francisca Williamson   64 y.o. female    Date of Visit: 5/26/2020    Chief Complaint   Patient presents with     body aches     just not feeling well     Sore Throat        Assessment and Plan   1. Sore throat, myalgia and low-grade fever  Experiencing URI symptoms including sore throat with body aches and low-grade fever.  No cough or dyspnea.  Concern for possible COVID-19.  Risk factors include age over 65.  She also has a  at home with COPD.  I am recommending that she get tested for COVID19 and hopefully this can be done ASAP.  In the meantime, I am recommending that she self quarantine and isolate herself from her  who is at even higher risk than her.  - Symptomatic COVID-19 Virus (CORONAVIRUS) PCR; Future      No follow-ups on file. "     History of Present Illness   This 64 y.o. old woman with history of celiac experiencing sore throat with body aches the past 4 days.  Low-grade fever with temperature 99 degrees.  No cough or shortness of breath.  Slight headache.  Mild malaise.   Her  has COPD.    Review of Systems:  Otherwise, a comprehensive review of systems was negative except as noted.     Medications, Allergies and Problem List   Patient Active Problem List   Diagnosis     Celiac disease/sprue     GERD (gastroesophageal reflux disease)     Osteoporosis     Hyperlipidemia LDL goal <130     Diverticulosis of large intestine without hemorrhage     Osteopenia     Sore throat       She has a past surgical history that includes Appendectomy.    Allergies   Allergen Reactions     Gluten      Celiac disease     Macrodantin [Nitrofurantoin Macrocrystalline] Rash       Current Outpatient Medications   Medication Sig Dispense Refill     cyanocobalamin (VITAMIN B-12) 1,000 mcg/mL injection Inject 1 mL (1,000 mcg total) into the shoulder, thigh, or buttocks every 30 (thirty) days. 3 mL 3     ERGOCALCIFEROL, VITAMIN D2, (VITAMIN D2 ORAL) Take 2,000 Units by mouth daily.       Lactobacillus rhamnosus GG (CULTURELLE) 10-15 Billion cell capsule Take 1 capsule by mouth daily.       polyethylene glycol (MIRALAX) 17 gram packet Take 17 g by mouth daily.       pravastatin (PRAVACHOL) 20 MG tablet Take 1 tablet (20 mg total) by mouth daily. 90 tablet 3     No current facility-administered medications for this visit.         Physical Exam   General Appearance:   Well-appearing millage woman      Temperature 99     Additional Information   Social History     Tobacco Use     Smoking status: Never Smoker     Smokeless tobacco: Never Used   Substance Use Topics     Alcohol use: Yes     Alcohol/week: 7.0 standard drinks     Types: 7 Glasses of wine per week     Drug use: No              Niels Lewis MD      Video-Visit Details    Type of service:   Video Visit    Video End Time (time video stopped): 3:23 PM  Originating Location (pt. Location): Home    Distant Location (provider location):  [facility] INTERNAL MEDICINE     Platform used for Video Visit: Mal Lewis MD

## 2021-06-08 NOTE — TELEPHONE ENCOUNTER
Spoke with the patient and helped her to schedule an appointment with Dr. Li for Monday morning, 6/15/2020 at 10 am.  Patient had no further questions at this time.  Myranda CASTELAN CMA/MICHAEL....................3:40 PM

## 2021-06-08 NOTE — TELEPHONE ENCOUNTER
New Appointment Needed  What is the reason for the visit:    Same Date/Next Day Appt Request  What is the reason for your visit?: poss strep test -- see below    Provider Preference: lab appt  How soon do you need to be seen?:   Waitlist offered?: No  Okay to leave a detailed message:  Yes    Patient had a telephone visit with Dr. Lewis yesterday 5/26. Patient is being tested for COVID-19 today. Patient states that Dr. Lewis mentioned the possibility of doing labs for strep. Patient isn't sure if he meant this for incase COVID test is negative.    Is this what he meant if so, patient wants to know where she would go for this lab.     Please call and advise.

## 2021-06-08 NOTE — TELEPHONE ENCOUNTER
Not mentioned specifically in progress not that there is a need for strep test- ok to order or hold off for now? Has am a[[t @ [facility] this afternoon

## 2021-06-08 NOTE — TELEPHONE ENCOUNTER
My understanding is that they can do flu tests and strep tests at the same time . I can order the strep test if pt wants but if dr amnzanares didn't order it he proabbly didn't want it .

## 2021-06-08 NOTE — TELEPHONE ENCOUNTER
The plan was for her to get tested for COVID19 and if this returned negative, she should then have a strep test performed.  I placed the order for strep test.  I would suggest she wait until the results of COVID19 are known before she returns to any lab to have this done

## 2021-06-08 NOTE — PROGRESS NOTES
Impression:  Pharyngitis probably viral    Plan:  Tylenol, fluids, rest      Chief Complaint:  Chief Complaint   Patient presents with     Sore Throat     x7d on and off, low grade fever, bodyaches         HPI:   Francisca Williamson is a 64 y.o. female who presents to this clinic for the evaluation of sore throat.  Patient developed an illness 1 week ago characterized by sore throat, temperature to 199, and body aches.  She was tested 2 days ago for COVID-19 which was negative.  She returns because the sore throat continues.  The sore throat is moderate and constant for 7 days.  No nausea or vomiting.  No cough chest pain or shortness of breath.  No other complaints      PMH:   Past Medical History:   Diagnosis Date     B12 deficiency      Celiac disease      Diverticulitis      Diverticulosis      GERD (gastroesophageal reflux disease)     states main problem is heartburn     Osteoporosis      Past Surgical History:   Procedure Laterality Date     APPENDECTOMY      age 13         ROS:  All other systems negative    Meds:    Current Outpatient Medications:      cyanocobalamin (VITAMIN B-12) 1,000 mcg/mL injection, Inject 1 mL (1,000 mcg total) into the shoulder, thigh, or buttocks every 30 (thirty) days., Disp: 3 mL, Rfl: 3     ERGOCALCIFEROL, VITAMIN D2, (VITAMIN D2 ORAL), Take 2,000 Units by mouth daily., Disp: , Rfl:      Lactobacillus rhamnosus GG (CULTURELLE) 10-15 Billion cell capsule, Take 1 capsule by mouth daily., Disp: , Rfl:      polyethylene glycol (MIRALAX) 17 gram packet, Take 17 g by mouth daily., Disp: , Rfl:      pravastatin (PRAVACHOL) 20 MG tablet, Take 1 tablet (20 mg total) by mouth daily., Disp: 90 tablet, Rfl: 3        Social:  Social History     Socioeconomic History     Marital status:      Spouse name: Not on file     Number of children: Not on file     Years of education: Not on file     Highest education level: Not on file   Occupational History     Occupation: Retired public health     Social Needs     Financial resource strain: Not on file     Food insecurity     Worry: Not on file     Inability: Not on file     Transportation needs     Medical: Not on file     Non-medical: Not on file   Tobacco Use     Smoking status: Never Smoker     Smokeless tobacco: Never Used   Substance and Sexual Activity     Alcohol use: Yes     Alcohol/week: 7.0 standard drinks     Types: 7 Glasses of wine per week     Drug use: No     Sexual activity: Not Currently     Partners: Male   Lifestyle     Physical activity     Days per week: Not on file     Minutes per session: Not on file     Stress: Not on file   Relationships     Social connections     Talks on phone: Not on file     Gets together: Not on file     Attends Denominational service: Not on file     Active member of club or organization: Not on file     Attends meetings of clubs or organizations: Not on file     Relationship status: Not on file     Intimate partner violence     Fear of current or ex partner: Not on file     Emotionally abused: Not on file     Physically abused: Not on file     Forced sexual activity: Not on file   Other Topics Concern     Not on file   Social History Narrative     Not on file         Physical Exam:  Vitals:    05/29/20 1659   BP: 122/81   Patient Site: Right Arm   Patient Position: Sitting   Cuff Size: Adult Regular   Pulse: 74   Resp: 16   Temp: 97.8  F (36.6  C)   TempSrc: Oral   SpO2: 98%   Weight: 154 lb 4 oz (70 kg)      Vital signs reviewed  Eyes: PERRL, EOMI  Head: Atraumatic and normocephalic  Pharynx: Erythema, no exudate, no other abnormalities  Neck: No mass or tenderness, no adenopathy  Neuro: Normal motor and sensory function in all extremities  Psych: Awake, alert, normally responsive      Results:    Recent Results (from the past 24 hour(s))   Rapid Strep A Screen-Throat swab    Specimen: Throat   Result Value Ref Range    Rapid Strep A Antigen No Group A Strep detected, presumptive negative No Group A Strep  detected, presumptive negative       No results found.      Kevin Gibson MD

## 2021-06-08 NOTE — PROGRESS NOTES
Francisca Williamson is a 64 y.o. female who is being evaluated via a billable video visit.        Video Visit   Francisca Williamson   64 y.o. female    Date of Visit: 5/11/2020    Chief Complaint   Patient presents with     Establish care visit        Assessment and Plan   1. Hyperlipidemia LDL goal <130  She tolerates her pravastatin.  No cardiac symptoms.  Will continue and recheck with her next physical in 4-5 months.  - pravastatin (PRAVACHOL) 20 MG tablet; Take 1 tablet (20 mg total) by mouth daily.  Dispense: 90 tablet; Refill: 3    2. Osteopenia, unspecified location  - cyanocobalamin (VITAMIN B-12) 1,000 mcg/mL injection; Inject 1 mL (1,000 mcg total) into the shoulder, thigh, or buttocks every 30 (thirty) days.  Dispense: 3 mL; Refill: 3      Return in about 4 months (around 9/11/2020) for In person, Annual physical.     History of Present Illness   This 64 y.o. old is evaluated with a video visit today.  She is establishing care.  She has a history of hyperlipidemia and osteopenia.  No acute concerns today.  Has been feeling well.  Is up to date on screening and we will need to get her outside records.      Review of Systems: As above, systems otherwise reviewed and negative.     Medications, Allergies and Problem List   Patient Active Problem List   Diagnosis     Celiac disease/sprue     GERD (gastroesophageal reflux disease)     Osteoporosis     Hyperlipidemia LDL goal <130     Diverticulosis of large intestine without hemorrhage     Osteopenia     Current Outpatient Medications   Medication Sig Dispense Refill     ERGOCALCIFEROL, VITAMIN D2, (VITAMIN D2 ORAL) Take 2,000 Units by mouth daily.       Lactobacillus rhamnosus GG (CULTURELLE) 10-15 Billion cell capsule Take 1 capsule by mouth daily.       polyethylene glycol (MIRALAX) 17 gram packet Take 17 g by mouth daily.       cyanocobalamin (VITAMIN B-12) 1,000 mcg/mL injection Inject 1 mL (1,000 mcg total) into the shoulder, thigh, or buttocks every 30 (thirty)  "days. 3 mL 3     pravastatin (PRAVACHOL) 20 MG tablet Take 1 tablet (20 mg total) by mouth daily. 90 tablet 3     No current facility-administered medications for this visit.      Allergies   Allergen Reactions     Gluten      Celiac disease     Macrodantin [Nitrofurantoin Macrocrystalline] Rash          Physical Exam     There were no vitals taken for this visit.    This is an alert female, sitting comfortably, NAD.     Additional Information   Social History     Tobacco Use     Smoking status: Never Smoker     Smokeless tobacco: Never Used   Substance Use Topics     Alcohol use: Yes     Alcohol/week: 7.0 standard drinks     Types: 7 Glasses of wine per week     Drug use: No            Jelly Li MD    The patient has been notified of following:     \"This video visit will be conducted via a call between you and your physician/provider. We have found that certain health care needs can be provided without the need for an in-person physical exam.  This service lets us provide the care you need with a video conversation.  If a prescription is necessary we can send it directly to your pharmacy.  If lab work is needed we can place an order for that and you can then stop by our lab to have the test done at a later time.    Video visits are billed at different rates depending on your insurance coverage. Please reach out to your insurance provider with any questions.    If during the course of the call the physician/provider feels a video visit is not appropriate, you will not be charged for this service.\"    Patient has given verbal consent to a Video visit? Yes    Patient would like to receive their AVS by AVS Preference: Zenaida.    Patient would like the video invitation sent by: Text to cell phone: [phone]    Will anyone else be joining your video visit? No        Video Start Time: 1:45 pm        Video-Visit Details    Type of service:  Video Visit    Video End Time (time video stopped): 2:00 " pm  Originating Location (pt. Location): Home    Distant Location (provider location):  [facility]     Platform used for Video Visit: Mal

## 2021-06-08 NOTE — PROGRESS NOTES
Office Visit   Francisca Williamson   64 y.o. female    Date of Visit: 6/15/2020    Chief Complaint   Patient presents with     Sore Throat     Since 5/22, pressure left ear        Assessment and Plan   1. Sore throat  Her examination is normal.  I do not see any sign of bacterial infection.  Likely she developed a viral upper respiratory illness and does have some lingering symptoms of a mild sore throat.  I have advised her to continue to use over-the-counter remedies.  If she does start to feel like she is developing a sinus infection she will let me know and we would treat with antibiotics.  She is in agreement with this plan.         No follow-ups on file.     History of Present Illness   This 64 y.o. old female comes in due to ongoing sore throat and ear fullness.  About 3 weeks ago she did develop a low-grade fever with muscle aches.  She developed a sore throat at that time.  She was tested for COVID-19 as well as strep and those tests have come back negative.  She is no longer having any fevers or aching.  She continues to have a mild sore throat with some ear fullness.  She is better than initially but feels that she is plateaued and is not getting much better now.  No cough, shortness of breath or any other new symptoms.  No significant sinus pain.  She does have some postnasal sinus drainage.    Review of Systems: As above, systems otherwise reviewed and negative.     Medications, Allergies and Problem List   Patient Active Problem List   Diagnosis     Celiac disease/sprue     GERD (gastroesophageal reflux disease)     Osteoporosis     Hyperlipidemia LDL goal <130     Diverticulosis of large intestine without hemorrhage     Osteopenia     Sore throat     Current Outpatient Medications   Medication Sig Dispense Refill     cyanocobalamin (VITAMIN B-12) 1,000 mcg/mL injection Inject 1 mL (1,000 mcg total) into the shoulder, thigh, or buttocks every 30 (thirty) days. 3 mL 3     ERGOCALCIFEROL, VITAMIN D2,  "(VITAMIN D2 ORAL) Take 2,000 Units by mouth daily.       Lactobacillus rhamnosus GG (CULTURELLE) 10-15 Billion cell capsule Take 1 capsule by mouth daily.       polyethylene glycol (MIRALAX) 17 gram packet Take 17 g by mouth daily.       pravastatin (PRAVACHOL) 20 MG tablet Take 1 tablet (20 mg total) by mouth daily. 90 tablet 3     No current facility-administered medications for this visit.      Allergies   Allergen Reactions     Gluten      Celiac disease     Macrodantin [Nitrofurantoin Macrocrystalline] Rash          Physical Exam     /80 (Patient Site: Right Arm, Patient Position: Sitting)   Pulse 72   Temp 98.2  F (36.8  C)   Ht 5' 5.5\" (1.664 m)   Wt 159 lb (72.1 kg)   SpO2 97%   BMI 26.06 kg/m      General:  Patient is alert and in no apparent distress.  HEENT: Both tympanic membranes are translucent with a good light reflex.  Oropharynx shows moist mucous membranes with mild posterior pharyngeal erythema but no exudates.  Neck:  Supple with mild adenopathy.  Cardiovascular:  Regular rate and rhythm, normal S1/S2, no murmurs, rubs, or gallop.  Pulmonary:  Lungs are clear to auscultation bilaterally with normal respiratory effort.           Additional Information   Social History     Tobacco Use     Smoking status: Never Smoker     Smokeless tobacco: Never Used   Substance Use Topics     Alcohol use: Yes     Alcohol/week: 7.0 standard drinks     Types: 7 Glasses of wine per week     Drug use: No          Jelly Li MD    "

## 2021-06-09 ENCOUNTER — THERAPY VISIT (OUTPATIENT)
Dept: PHYSICAL THERAPY | Facility: CLINIC | Age: 66
End: 2021-06-09
Payer: MEDICARE

## 2021-06-09 DIAGNOSIS — K56.41 OBSTRUCTIVE DEFECATION (H): ICD-10-CM

## 2021-06-09 DIAGNOSIS — M62.89 PELVIC FLOOR DYSFUNCTION IN FEMALE: ICD-10-CM

## 2021-06-09 PROCEDURE — 97112 NEUROMUSCULAR REEDUCATION: CPT | Mod: GP | Performed by: PHYSICAL THERAPIST

## 2021-06-09 PROCEDURE — 97161 PT EVAL LOW COMPLEX 20 MIN: CPT | Mod: GP | Performed by: PHYSICAL THERAPIST

## 2021-06-09 PROCEDURE — 97530 THERAPEUTIC ACTIVITIES: CPT | Mod: GP | Performed by: PHYSICAL THERAPIST

## 2021-06-09 NOTE — PROGRESS NOTES
Physical Therapy Initial Evaluation  Subjective:  The history is provided by the patient. No  was used.   Therapist Generated HPI Evaluation  Problem details: Date of MD order for this episode was 5-13-21. Francisca has a hx of celiac, diverticulosis, chronic constipation. She is here today for pelvic floor evaluation and BFT. [facility], Dr Lobato.   Francisca does not have any urinary issues  Fluids-8-10 glasses of water, 1 cup coffee, 1 glass wine  Francisca went dairy free about 6 wks ago and has noticed improvement in the abdominal pain/spasms and stools. She is also gluten free  Francisca does have some pain with intercourse, entering  Abdominal pain-colon spasms, is L lower abdominal area. Has mild pain daily, 3/10PL(prior to going dairy free was 7/10).  Bowels-5-6/day, in am, type 2-3 on bristol stool chart, occasionally type 6. Does strain to go(harder to empty as day goes on). Does occasionally use suppositories(not past 2 wks). No manual evacuation. Got a new higher toilet, unaware of squatty potty position.   Fiber-eats a lot of fruits and veggies, smoothies/almond milk  Miralax 2x/day, am and pm  Exercise-walks 30-40 min daily  Francisca gave permission for intravaginal exam today  .         Type of problem:  Pelvic dysfunction and other (constipation pelvic pain).    This is a chronic condition.  Condition occurred with:  Insidious onset.  Where condition occurred: for unknown reasons.  Patient reports pain:  Other (L lower abdominal pain).  Pain is described as cramping (spasm) and is intermittent.  Pain is the same all the time.  Since onset symptoms are gradually improving.  Symptoms are exacerbated by other (unsure)  and relieved by other (better dairy free).      Work activity restrictions: retired.  Barriers include:  None as reported by patient.    Patient Health History  Francisca Williamson being seen for biofeedback therapy.     Date of Onset: 2 years ago.   Problem  occurred: diverticulosis severe with diverticulitis episodes     General health as reported by patient is good.  Pertinent medical history includes: overweight (celiac disease).   Red flags:  Changes in bowel and bladder habits.  Medical allergies: other. Other medical allergies details: gluten and dairy free.   Surgeries include:  Other (appendectomy D&C).    Current medications:  Other (for cholesterol and B12 shots).    Current occupation is retired.   Primary job tasks include:  Prolonged standing, lifting/carrying and pushing/pulling.                                    Objective:  System                                 Pelvic Dysfunction Evaluation:        Flexibility:    Tightness present at:Iliopsoas and Hamstrings  Tightness not present at:  Adductors; Piriformis or Gluteals    Abdominal Wall:  Abdominal wall pelvic: good tissue mobility over abdomen, chest breather vs diaphragmatic.        Pelvic Clock Exam:    Ischiocavernosis pain:  -  Bulbocavernosis pain:  -  Transverse Perineal:  -  Levator ANI:  -  Perineal Body:  -  SI Provocation:    Positive for: ASLR        External Assessment:  External assessment pelvic: subst with glute max, reduced bulge of PFM when asked.  Skin Condition:  Normal      Tissue Symmetry:  Normal  Introitus:  Normal  Muscle Contraction/Perineal Mobility:  Substitution  Internal Assessment:  Internal assessment pelvic: uses glute max and adductors with PFM activation, weaker on L side, minimal OI activation L until tactile cues.  Sensory Exam:  Normal  Contraction/Grade:  Fair squeeze, definite lift (3)    Accessory Muscle use-Gluteals:  X  Accessory Muscle use-Adductors:  X    SEMG Biofeedback:  Semg biofeedback pelvic: seated defecation position, needs to improve mechanics and positioning. Rest =3.4uV, increased slightly with her normal mechanics, with cues was able to keep rest tone but was inconsistent. Will continue to work on NMR for defecation and PFM in  general.  Equipment:  Pathway    Suraface electrode placement--Perianal:  X  Baseline EMG PM:  2.4      Sustained contraction:  Phasic ave=8.1, tonic ave=6  EMG interpretation to fatigue:  5-8 seconds  Position:  SupineAdditional History:    Number of Pregnancies: 0  Number of Live Births: 0  Caffeine Consumption:  1 cup coffee          Hip Evaluation  Hip PROM:  : reduced IR. : reduced IR.                          Hip Strength:    Flexion:   Left: 4+/5   Pain:  Right: 4+/5   Pain:                    Extension:  Left: 3+/5  Pain:Right: 3+/5    Pain:    Abduction:  Left: 3+/5     Pain:Right: 3/5    Pain:    Internal Rotation:  Left: 4/5    Pain:Right: 4/5   Pain:  External Rotation:  Left: 4-/5   Pain:  Right: 4/5   Pain:  Knee Flexion:  Left: 5/5   Pain:Right: 5/5   Pain:  Knee Extension:  Left: 5/5   Pain:Right: 5/5    Pain:                       General     ROS    Assessment/Plan:    Patient is a 65 year old female with pelvic complaints.    Patient has the following significant findings with corresponding treatment plan.                Diagnosis 1:  Obstructed defecation, pelvic floor dysfunction  Pain -  manual therapy, self management, education and home program  Decreased ROM/flexibility - manual therapy, therapeutic exercise and home program  Decreased strength - therapeutic exercise and therapeutic activities  Decreased proprioception - neuro re-education, therapeutic activities and home program  Impaired muscle performance - biofeedback, neuro re-education and home program  Decreased function - therapeutic activities and home program    Therapy Evaluation Codes:   1) History comprised of:   Personal factors that impact the plan of care:      Age, Past/current experiences and Time since onset of symptoms.    Comorbidity factors that impact the plan of care are:      celiac, diverticulosis.     Medications impacting care: miralax.  2) Examination of Body Systems comprised of:   Body structures and functions  that impact the plan of care:      Hip and Pelvis.   Activity limitations that impact the plan of care are:      constipation-frequent stools, difficult evacuation.  3) Clinical presentation characteristics are:   Stable/Uncomplicated.  4) Decision-Making    Low complexity using standardized patient assessment instrument and/or measureable assessment of functional outcome.  Cumulative Therapy Evaluation is: Low complexity.    Previous and current functional limitations:  (See Goal Flow Sheet for this information)    Short term and Long term goals: (See Goal Flow Sheet for this information)     Communication ability:  Patient appears to be able to clearly communicate and understand verbal and written communication and follow directions correctly.  Treatment Explanation - The following has been discussed with the patient:   RX ordered/plan of care  Anticipated outcomes  Possible risks and side effects  This patient would benefit from PT intervention to resume normal activities.   Rehab potential is good.    Frequency:  1 X week, once daily  Duration:  for 6 weeks  Discharge Plan:  Achieve all LTG.  Independent in home treatment program.  Reach maximal therapeutic benefit.    Please refer to the daily flowsheet for treatment today, total treatment time and time spent performing 1:1 timed codes.

## 2021-06-09 NOTE — LETTER
DEPARTMENT OF HEALTH AND HUMAN SERVICES  [facility] FOR MED[facility]ARE & MEDICAID SERVICES    PLAN/UPDATED PLAN OF PROGRESS FOR OUTPATIENT REHABILITATION    PATIENTS NAME:  Francisca Williamson   : 1955  PROVIDER NUMBER:    [id number]  HICN:  [id number]  PROVIDER NAME: [facility]  MEDICAL RECORD NUMBER: [id number]   START OF CARE DATE:  SOC Date: 21   TYPE:  PT  PRIMARY/TREATMENT DIAGNOSIS: (Pertinent Medical Diagnosis)  Obstructive defecation  Pelvic floor dysfunction in female  VISITS FROM START OF CARE:  Rxs Used: 1     Physical Therapy Initial Evaluation  Subjective:  The history is provided by the patient. No  was used.   Therapist Generated HPI Evaluation  Problem details: Date of MD order for this episode was 21. Francisca has a hx of celiac, diverticulosis, chronic constipation. She is here today for pelvic floor evaluation and BFT. [facility], Dr Lobato.   Francisca does not have any urinary issues  Fluids-8-10 glasses of water, 1 cup coffee, 1 glass wine  Francisca went dairy free about 6 wks ago and has noticed improvement in the abdominal pain/spasms and stools. She is also gluten free  Francisca does have some pain with intercourse, entering  Abdominal pain-colon spasms, is L lower abdominal area. Has mild pain daily, 3/10PL(prior to going dairy free was 7/10).  Bowels-5-6/day, in am, type 2-3 on bristol stool chart, occasionally type 6. Does strain to go(harder to empty as day goes on). Does occasionally use suppositories(not past 2 wks). No manual evacuation. Got a new higher toilet, unaware of squatty potty position.   Fiber-eats a lot of fruits and veggies, smoothies/almond milk  Miralax 2x/day, am and pm  Exercise-walks 30-40 min daily  Francisca gave permission for intravaginal exam today     Type of problem:  Pelvic dysfunction and other (constipation pelvic pain).  This is a chronic condition.  Condition occurred with:   Insidious onset.  Where condition occurred: for unknown reasons.  Patient reports pain:  Other (L lower abdominal pain).  Pain is described as cramping (spasm) and is intermittent.  Pain is the same all the time.  Since onset symptoms are gradually improving.  Symptoms are exacerbated by other (unsure)  and relieved by other (better dairy free).  Work activity restrictions: retired.  Barriers include:  None as reported by patient.    Patient Health History  Francisca Williamson being seen for biofeedback therapy.   Date of Onset: 2 years ago.   Problem occurred: diverticulosis severe with diverticulitis episodes   PATIENTS NAME:  Francisca Williamson   : 1955    General health as reported by patient is good.  Pertinent medical history includes: overweight (celiac disease).   Red flags:  Changes in bowel and bladder habits.  Medical allergies: other. Other medical allergies details: gluten and dairy free.   Surgeries include:  Other (appendectomy D&C).    Current medications:  Other (for cholesterol and B12 shots).    Current occupation is retired.   Primary job tasks include:  Prolonged standing, lifting/carrying and pushing/pulling.                    Objective:  System   Pelvic Dysfunction Evaluation:    Flexibility:    Tightness present at:Iliopsoas and Hamstrings  Tightness not present at:  Adductors; Piriformis or Gluteals  Abdominal Wall:  Abdominal wall pelvic: good tissue mobility over abdomen, chest breather vs diaphragmatic.  Pelvic Clock Exam:    Ischiocavernosis pain:  -  Bulbocavernosis pain:  -  Transverse Perineal:  -  Levator ANI:  -  Perineal Body:  -  SI Provocation:    Positive for: ASLR  External Assessment:  External assessment pelvic: subst with glute max, reduced bulge of PFM when asked.  Skin Condition:  Normal  Tissue Symmetry:  Normal  Introitus:  Normal  Muscle Contraction/Perineal Mobility:  Substitution  Internal Assessment:  Internal assessment pelvic: uses glute max and adductors with PFM  activation, weaker on L side, minimal OI activation L until tactile cues.  Sensory Exam:  Normal  Contraction/Grade:  Fair squeeze, definite lift (3)  Accessory Muscle use-Gluteals:  X  Accessory Muscle use-Adductors:  X    SEMG Biofeedback:  Semg biofeedback pelvic: seated defecation position, needs to improve mechanics and positioning. Rest =3.4uV, increased slightly with her normal mechanics, with cues was able to keep rest tone but was inconsistent. Will continue to work on [facility] for defecation and PFM in general.  Equipment:  Pathway  Suraface electrode placement--Perianal:  X  Baseline EMG PM:  2.4  Sustained contraction:  Phasic ave=8.1, tonic ave=6  EMG interpretation to fatigue:  5-8 seconds  Position:  SupineAdditional History:  Number of Pregnancies: 0  Number of Live Births: 0  Caffeine Consumption:  1 cup coffee      PATIENTS NAME:  Francisca Williamson   : 1955      Hip Evaluation  Hip PROM:  : reduced IR. : reduced IR.  Hip Strength:    Flexion:   Left: 4+/5   Pain:  Right: 4+/5   Pain:                  Extension:  Left: 3+/5  Pain:Right: 3+/5    Pain:    Abduction:  Left: 3+/5     Pain:Right: 3/5    Pain:  Internal Rotation:  Left: 4/5    Pain:Right: 4/5   Pain:  External Rotation:  Left: 4-/5   Pain:  Right: 4/5   Pain:  Knee Flexion:  Left: 5/5   Pain:Right: 5/5   Pain:  Knee Extension:  Left: 5/5   Pain:Right: 5/5    Pain:    Assessment/Plan:    Patient is a 65 year old female with pelvic complaints.    Patient has the following significant findings with corresponding treatment plan.                Diagnosis 1:  Obstructed defecation, pelvic floor dysfunction  Pain -  manual therapy, self management, education and home program  Decreased ROM/flexibility - manual therapy, therapeutic exercise and home program  Decreased strength - therapeutic exercise and therapeutic activities  Decreased proprioception - neuro re-education, therapeutic activities and home program  Impaired muscle performance -  biofeedback, neuro re-education and home program  Decreased function - therapeutic activities and home program    Therapy Evaluation Codes:   1) History comprised of:   Personal factors that impact the plan of care:      Age, Past/current experiences and Time since onset of symptoms.    Comorbidity factors that impact the plan of care are:      celiac, diverticulosis.     Medications impacting care: miralax.  2) Examination of Body Systems comprised of:   Body structures and functions that impact the plan of care:      Hip and Pelvis.   Activity limitations that impact the plan of care are:      constipation-frequent stools, difficult evacuation.  3) Clinical presentation characteristics are:   Stable/Uncomplicated.  4) Decision-Making    Low complexity using standardized patient assessment instrument and/or measureable assessment of functional outcome.  Cumulative Therapy Evaluation is: Low complexity.  Previous and current functional limitations:  (See Goal Flow Sheet for this information)    Short term and Long term goals: (See Goal Flow Sheet for this information)   Communication ability:  Patient appears to be able to clearly communicate and understand verbal and written communication and follow directions correctly.  Treatment Explanation - The following has been discussed with the patient:   RX ordered/plan of care  Anticipated outcomes  Possible risks and side effects  This patient would benefit from PT intervention to resume normal activities.   Rehab potential is good.  Frequency:  1 X week, once daily  PATIENTS NAME:  Francisca Williamson   : 1955  Duration:  for 6 weeks  Discharge Plan:  Achieve all LTG.  Independent in home treatment program.  Reach maximal therapeutic benefit.  Please refer to the daily flowsheet for treatment today, total treatment time and time spent performing 1:1 timed codes.           Caregiver Signature/Credentials _____________________________ Date ________      Treating  "Provider: Melisa Mead PT   I have reviewed and certified the need for these services and plan of treatment while under my care.        PHYSICIAN'S SIGNATURE:   _________________________________________  Date___________   Sabiha Lobato M.D.    Certification period:  Beginning of Cert date period: 06/09/21 to  End of Cert period date: 09/06/21     Functional Level Progress Report: Please see attached \"Goal Flow sheet for Functional level.\"    ____X____ Continue Services or       ________ DC Services                Service dates: From  SOC Date: 06/09/21 date to present                         "

## 2021-06-12 NOTE — PROGRESS NOTES
Office Visit   Francisca Williamson   65 y.o. female    Date of Visit: 10/8/2020    Chief Complaint   Patient presents with     Follow-up     Cellulitis left arm        Assessment and Plan   1. Thrombosis of left upper extremity  The swelling in the left elbow area is probably a lymph node but I think we should do an ultrasound to make sure is not a blood clot.  I have advised her in the meantime to take a daily aspirin.  She is in agreement with this plan.  - US Venous Arm Left; Future    2. Localized swelling, mass and lump, left upper limb   As above we will set up an ultrasound.  Also her cellulitis is improving.  - US Venous Arm Left; Future         No follow-ups on file.     History of Present Illness   This 65 y.o. old female comes in due to arm swelling.  She received a flu shot 2 days ago and then developed swelling and redness of her left upper extremity.  She went to urgent care last evening and was started on antibiotics for cellulitis.  That redness has improved today but now she is noting a swollen area and enlargement of the vein in the elbow area.  She has no other acute concerns today.    Review of Systems: As above, systems otherwise reviewed and negative.     Medications, Allergies and Problem List   Patient Active Problem List   Diagnosis     Celiac disease/sprue     GERD (gastroesophageal reflux disease)     Osteoporosis     Hyperlipidemia LDL goal <130     Diverticulosis of large intestine without hemorrhage     Osteopenia     B12 deficiency     BPV (benign positional vertigo)     History of colonic polyps     Current Outpatient Medications   Medication Sig Dispense Refill     cephalexin (KEFLEX) 500 MG capsule Take 500 mg by mouth.       cyanocobalamin (VITAMIN B-12) 1,000 mcg/mL injection Inject 1 mL (1,000 mcg total) into the shoulder, thigh, or buttocks every 30 (thirty) days. 3 mL 3     ERGOCALCIFEROL, VITAMIN D2, (VITAMIN D2 ORAL) Take 2,000 Units by mouth daily.       estradioL (ESTRACE) 0.01  % (0.1 mg/gram) vaginal cream Insert 2 g into the vagina daily.       Lactobacillus rhamnosus GG (CULTURELLE) 10-15 Billion cell capsule Take 1 capsule by mouth daily.       polyethylene glycol (MIRALAX) 17 gram packet Take 17 g by mouth daily.       pravastatin (PRAVACHOL) 20 MG tablet Take 1 tablet (20 mg total) by mouth daily. 90 tablet 3     No current facility-administered medications for this visit.      Allergies   Allergen Reactions     Alendronate Sodium      Other reaction(s): GI Upset     Gluten      Celiac disease     Macrodantin [Nitrofurantoin Macrocrystalline] Rash          Physical Exam     /70 (Patient Site: Right Arm)   Pulse 78   Temp 98.4  F (36.9  C)   SpO2 97%     General: This is an alert female in no apparent distress.  Left arm: On the upper arm there is just a slight amount of redness where her cellulitis was.  No significant warmth or induration.  In the elbow crease there is an area of swelling in her vein does appear slightly enlarged.     Additional Information   Social History     Tobacco Use     Smoking status: Never Smoker     Smokeless tobacco: Never Used   Substance Use Topics     Alcohol use: Yes     Alcohol/week: 7.0 standard drinks     Types: 7 Glasses of wine per week     Drug use: No            Jelly Li MD

## 2021-06-12 NOTE — TELEPHONE ENCOUNTER
Please let her know that yes it would be good if she cancelled her appointment today.  With body aches and congestion, I could order a COVID test for her if she'd like.  Let me know and I'll order.

## 2021-06-12 NOTE — TELEPHONE ENCOUNTER
I recommend she be set up for COVID testing with the aches and congestion.  Also, reschedule her appointment.  If she agrees, I'll order the COVID test.  Thanks.

## 2021-06-12 NOTE — TELEPHONE ENCOUNTER
"Pt reports cellulitis diagnosis on 10/07, started on Keflex.  Pt took for 1.5 days.  Pt seen in clinic yesterday for swelling with blood clot concerns.  Pt then seen in clinic today due to a rash on her arms.  Keflex stopped, Clindamycin started.  The rash had somewhat faded away until 8PM this evening when the rash appeared on her chest and arms again.  Pt took the first dose of Clindamycin at 6:45PM.     Rash is light pink/red, small dots on chest, flat, non-itchy.      Pt states the cellulitis of her arm looks a lot better.  \"There is almost no redness\" and only \"mild swelling\".  It does feel warm along with the rest of that arm.      Disposition:  Call PCP per protocol.   09:12PM:  Smart Web used to page on-call MD Theo Loera to call RN at [phone].    09:19PM: MD Loera called, advised pt to apply a warm cloth to her cellulitis, take Ibuprofen, take Benadryl, and monitor.  He does not want to prescribe a third antibiotic.      09:25PM:  RN called pt and provided the information above.  Also advised her to call back with any new/worsening s/s.  Patient verbalized understanding and had no further questions.      Tanisha Martino RN, FNA      Reason for Disposition    [1] Caller has URGENT question AND [2] triager unable to answer question    Additional Information    Negative: Shock suspected (e.g., cold/pale/clammy skin, too weak to stand, low BP, rapid pulse)    Negative: Sounds like a life-threatening emergency to the triager    Negative: SEVERE pain    Negative: [1] SEVERE pain with bending of finger (or toe) AND [2] cellulitis on hand (or foot)    Negative: Fever > 104 F (40 C)    Negative: [1] Widespread rash AND [2] bright red, sunburn-like    Negative: Black (necrotic), dark purple, or blisters develop in area of cellulitis    Negative: Patient sounds very sick or weak to the triager    Negative: [1] Taking antibiotic > 24 hours AND [2] fever > 100.5 F (38.1 C)    Negative: [1] Taking antibiotic > 24 " hours AND [2] red streak (or line) runs from area of infection    Negative: [1] Fever > 100.0 F (37.8 C) AND [2] new onset    Negative: [1] Red streak runs from area of infection AND [2] new onset    Protocols used: CELLULITIS ON ANTIBIOTIC FOLLOW-UP CALL-A-AH

## 2021-06-12 NOTE — TELEPHONE ENCOUNTER
Francisca is calling and states that she has an appointment today at 3:20 with MD Li.  This morning now is having body aches and congestion.  Francisca is calling to see if she should reschedule.  Please phone Francisca.  The appointment is for a recent visit to urgency room.  Francisca is requesting a call back.      COVID 19 Nurse Triage Plan/Patient Instructions    Please be aware that novel coronavirus (COVID-19) may be circulating in the community. If you develop symptoms such as fever, cough, or SOB or if you have concerns about the presence of another infection including coronavirus (COVID-19), please contact your health care provider or visit [email or web address].     Disposition/Instructions    Home care recommended. Follow home care protocol based instructions.    Thank you for taking steps to prevent the spread of this virus.  o Limit your contact with others.  o Wear a simple mask to cover your cough.  o Wash your hands well and often.    Resources    [facility]: About COVID-19: [email or web address]    CDC: What to Do If You're Sick: www.cdc.gov/coronavirus/2019-ncov/about/steps-when-sick.html    CDC: Ending Home Isolation: www.cdc.gov/coronavirus/2019-ncov/hcp/disposition-in-home-patients.html     CDC: Caring for Someone: www.cdc.gov/coronavirus/2019-ncov/if-you-are-sick/care-for-someone.html     [facility]: Interim Guidance for Hospital Discharge to Home: www.health.[facility].mn.us/diseases/coronavirus/hcp/hospdischarge.pdf    [facility] clinical trials (COVID-19 research studies): clinicalaffairs.[facility]/n-clinical-trials     Below are the COVID-19 hotlines at the [place] Department of Health ([facility]). Interpreters are available.   o For health questions: Call 448-952-0660 or 1-581.522.5913 (7 a.m. to 7 p.m.)  o For questions about schools and childcare: Call 300-129-7205 or 1-978.888.8972 (7 a.m. to 7 p.m.)       Reason for Disposition    Nursing judgment or information in  reference    Additional Information    Negative: Nursing judgment, per information in Reference    Negative: Nursing judgment or information in reference    Negative: Nursing judgment or information in reference    Negative: Nursing judgment or information in reference    Negative: Nursing judgment or information in reference    Negative: Nursing judgment or information in reference    Protocols used: NO GUIDELINE [id number]

## 2021-06-12 NOTE — TELEPHONE ENCOUNTER
"Patient calling - says she had a flu shot yesterday.  Today she has a large red, warm, swollen area below the injection site.  The area is more than two inches wide and says \"it looks weird.\"  She is worried about a skin infection.    No difficulty breathing or swallowing.  No fever.    Triaged to disposition of Home Care.  Patient is concerned about possible skin infection so she is going to head to Urgent Care now to have a provider look at it.     Ruyb Flores RN  Triage Nurse Advisor    COVID 19 Nurse Triage Plan/Patient Instructions    Please be aware that novel coronavirus (COVID-19) may be circulating in the community. If you develop symptoms such as fever, cough, or SOB or if you have concerns about the presence of another infection including coronavirus (COVID-19), please contact your health care provider or visit [email or web address].     Disposition/Instructions    In-Person Visit with provider recommended. Reference Visit Selection Guide.    Thank you for taking steps to prevent the spread of this virus.  o Limit your contact with others.  o Wear a simple mask to cover your cough.  o Wash your hands well and often.    Resources    [facility]: About COVID-19: [email or web address]    CDC: What to Do If You're Sick: www.cdc.gov/coronavirus/2019-ncov/about/steps-when-sick.html    CDC: Ending Home Isolation: www.cdc.gov/coronavirus/2019-ncov/hcp/disposition-in-home-patients.html     CDC: Caring for Someone: www.cdc.gov/coronavirus/2019-ncov/if-you-are-sick/care-for-someone.html     [facility]: Interim Guidance for Hospital Discharge to Home: www.health.[facility].mn.us/diseases/coronavirus/hcp/hospdischarge.pdf    [facility] clinical trials (COVID-19 research studies): clinicalaffairs.[facility]/umn-clinical-trials     Below are the COVID-19 hotlines at the [place] Department of Health ([facility]). Interpreters are available.   o For health questions: Call 229-914-4960 or 1-344.547.3254 (7 a.m. to 7 " p.m.)  o For questions about schools and childcare: Call 948-600-4267 or 1-111.236.2133 (7 a.m. to 7 p.m.)       Reason for Disposition    Influenza (TIV; Injection) injected vaccine reactions    Injection site reaction to any vaccine    Additional Information    Negative: [1] Difficulty with breathing or swallowing AND [2] starts within 2 hours after injection    Negative: Difficult to awaken or acting confused (e.g., disoriented, slurred speech)    Negative: Unresponsive, passed out, or very weak    Negative: Sounds like a life-threatening emergency to the triager    Negative: Fever > 104 F (40 C)    Negative: [1] Fever > 101 F (38.3 C) AND [2] age > 60    Negative: [1] Fever > 100.0 F (37.8 C) AND [2] bedridden (e.g., nursing home patient, CVA, chronic illness, recovering from surgery)    Negative: [1] Fever > 100.0 F (37.8 C) AND [2] diabetes mellitus or weak immune system (e.g., HIV positive, cancer chemo, splenectomy, organ transplant, chronic steroids)    Negative: [1] Measles vaccine rash (onset day 6-12) AND [2] purple or blood-colored    Negative: Sounds like a severe, unusual reaction to the triager    Negative: [1] Redness or red streak around the injection site AND [2] begins > 48 hours after shot AND [3] fever    Negative: [1] Redness or red streak around the injection site AND [2] begins > 48 hours after shot AND [3] no fever  (Exception: red area < 1 inch or 2.5 cm wide)    Negative: Fever present > 3 days (72 hours)    Negative: [1] Over 3 days (72 hours) since shot AND [2] redness, swelling or pain getting worse    Negative: [1] Smallpox vaccine and [2] eye pain, eye redness, or rash on eyelids    Negative: [1] Pain, tenderness, or swelling at the injection site AND [2] persists > 3 days    Negative: [1] Measles vaccine rash (onset day 6-12) AND [2] persists > 3 days    Negative: [1] Deep lump follows (in 2 to 8 weeks) Td or TDaP  shot AND [2] becomes tender to the touch    Negative: Immunization  needed, questions about    Protocols used: IMMUNIZATION [id number]

## 2021-06-12 NOTE — TELEPHONE ENCOUNTER
Called and spoke with patient. She reports that she is doing well and would like to cancel tomorrow appt. She offers no further concerns at this time.     I will cancel this appt per patient's request.

## 2021-06-12 NOTE — TELEPHONE ENCOUNTER
Dr. Li,  Spoke with the patient and relayed message below.  She verbalized understanding and states that she would like an order for Covid testing.  Her appointment has been cancelled for today.  Thank you.  Myranda CASTELAN CMA/MICHAEL....................10:43 AM

## 2021-06-12 NOTE — TELEPHONE ENCOUNTER
Please call her and if she's improving, then please cancel the appointment.  If she has any ongoing concerns, then she should come in.  Thanks

## 2021-06-12 NOTE — PROGRESS NOTES
"  Office Visit - Follow Up   Francisca Williamson   65 y.o. female    Date of Visit: 10/9/2020    Chief Complaint   Patient presents with     Rash        Assessment and Plan   1. Cellulitis, unspecified cellulitis site  I discussed with her that my preference would be she monitor this without antibiotics and apply warm compress to the area of thrombophlebitis.  This is concerning to her and therefore will treat with clindamycin, likely strep infection and I do not want to give her another beta-lactam at this time.  - clindamycin (CLEOCIN) 300 MG capsule; Take 1 capsule (300 mg total) by mouth 3 (three) times a day for 5 days.  Dispense: 15 capsule; Refill: 0    2. Thrombosis of left upper extremity  As above warm packs recommended NSAIDs    Return in about 1 week (around 10/16/2020) for follow up with PCP.     History of Present Illness   This 65 y.o. old woman got a flu shot and lab draw couple of days ago.  She developed what was concerning for cellulitis in the arm and some thrombophlebitis.  Had an ultrasound which reviewed which showed no blood clot.  She is been on Keflex but now has a rash on her arms.  No breathing difficulty or wheezing.  No prior allergy to cephalosporins or penicillins.  Rash seems to be better.  She is taken 5 doses of Keflex.    Review of Systems: A comprehensive review of systems was negative except as noted.     Medications, Allergies and Problem List   Reviewed, reconciled and updated  Post Discharge Medication Reconciliation Status:      Physical Exam   General Appearance:   No acute distress    /74 (Patient Site: Right Arm, Patient Position: Sitting, Cuff Size: Adult Regular)   Pulse 73   Ht 5' 5.5\" (1.664 m)   Wt 157 lb (71.2 kg)   SpO2 97%   BMI 25.73 kg/m      Very faint rash on her arms, area of thrombophlebitis over the brachial vein on the left arm no evidence of cellulitis currently     Additional Information   Current Outpatient Medications   Medication Sig Dispense " Refill     cyanocobalamin (VITAMIN B-12) 1,000 mcg/mL injection Inject 1 mL (1,000 mcg total) into the shoulder, thigh, or buttocks every 30 (thirty) days. 3 mL 3     ERGOCALCIFEROL, VITAMIN D2, (VITAMIN D2 ORAL) Take 2,000 Units by mouth daily.       estradioL (ESTRACE) 0.01 % (0.1 mg/gram) vaginal cream Insert 2 g into the vagina daily.       Lactobacillus rhamnosus GG (CULTURELLE) 10-15 Billion cell capsule Take 1 capsule by mouth daily.       polyethylene glycol (MIRALAX) 17 gram packet Take 17 g by mouth daily.       pravastatin (PRAVACHOL) 20 MG tablet Take 1 tablet (20 mg total) by mouth daily. 90 tablet 3     clindamycin (CLEOCIN) 300 MG capsule Take 1 capsule (300 mg total) by mouth 3 (three) times a day for 5 days. 15 capsule 0     No current facility-administered medications for this visit.      Allergies   Allergen Reactions     Alendronate Sodium      Other reaction(s): GI Upset     Gluten      Celiac disease     Keflex [Cephalexin] Other (See Comments)     10/9/2020 faint rash arms     Macrodantin [Nitrofurantoin Macrocrystalline] Rash     Social History     Tobacco Use     Smoking status: Never Smoker     Smokeless tobacco: Never Used   Substance Use Topics     Alcohol use: Yes     Alcohol/week: 7.0 standard drinks     Types: 7 Glasses of wine per week     Drug use: No       Review and/or order of clinical lab tests:  Review and/or order of radiology tests:  Review and/or order of medicine tests:  Discussion of test results with performing physician:  Decision to obtain old records and/or obtain history from someone other than the patient:  Review and summarization of old records and/or obtaining history from someone other than the patient and.or discussion of case with another health care provider:  Independent visualization of image, tracing or specimen itself:    Time:      Peter Loera MD

## 2021-06-12 NOTE — TELEPHONE ENCOUNTER
Francisca reports that she developed red itchy rashes on back of both her arms. Noticed a few minutes ago. At this time, still localized on upper extremities, worse on the side with cellulitis.    Has had 5 doses of Keflex for cellulitis post flu shot. No know allergy to Keflex.    No fever at 99.3F. Patient wants to be seen.    Disposition: clinic visit today.    Advised to call back for further concerns.     Michelle Garcia RN/[place] Nurse Advisor      Reason for Disposition    Patient wants to be seen    Additional Information    Negative: Sounds like a life-threatening emergency to the triager    Negative: Fever and localized purple or blood-colored spots or dots that are not from injury or friction    Negative: Fever and localized rash is very painful    Negative: Patient sounds very sick or weak to the triager    Negative: Looks like a boil, infected sore, deep ulcer, or other infected rash (spreading redness, pus)    Negative: Painful rash with multiple small blisters grouped together (i.e., dermatomal distribution or 'band' or 'stripe')    Negative: Localized rash is very painful (no fever)    Negative: Localized purple or blood-colored spots or dots that are not from injury or friction (no fever)    Negative: Lyme disease suspected (e.g., bull's-eye rash or tick bite / exposure)    Protocols used: RASH OR REDNESS - [id number]

## 2021-06-12 NOTE — TELEPHONE ENCOUNTER
Francisca was seen on Tuesday with MD Li a physical and flu shot.  Yesterday noticed a large red area in upper arm.  Went to urgent care and was diagnosed with cellulitis.  Now today feels like it is getting worse.  Francisca is taking Keflex currently three times a day.  Francisca also had blood drawn and now is having swelling around lab draw area on left arm. Francisca has not been on antibiotic for 24 hours.   Francisca is requesting an appointment.       COVID 19 Nurse Triage Plan/Patient Instructions    Please be aware that novel coronavirus (COVID-19) may be circulating in the community. If you develop symptoms such as fever, cough, or SOB or if you have concerns about the presence of another infection including coronavirus (COVID-19), please contact your health care provider or visit [email or web address].     Disposition/Instructions    In-Person Visit with provider recommended. Reference Visit Selection Guide.    Thank you for taking steps to prevent the spread of this virus.  o Limit your contact with others.  o Wear a simple mask to cover your cough.  o Wash your hands well and often.    Resources    [facility]: About COVID-19: [email or web address]    CDC: What to Do If You're Sick: www.cdc.gov/coronavirus/2019-ncov/about/steps-when-sick.html    CDC: Ending Home Isolation: www.cdc.gov/coronavirus/2019-ncov/hcp/disposition-in-home-patients.html     CDC: Caring for Someone: www.cdc.gov/coronavirus/2019-ncov/if-you-are-sick/care-for-someone.html     [facility]: Interim Guidance for Hospital Discharge to Home: www.health.[facility].mn.us/diseases/coronavirus/hcp/hospdischarge.pdf    [facility] clinical trials (COVID-19 research studies): clinicalaffairs.[facility].edu/um-clinical-trials     Below are the COVID-19 hotlines at the [place] Department of Health ([facility]). Interpreters are available.   o For health questions: Call 863-844-1331 or 1-139.151.2679 (7 a.m. to 7 p.m.)  o For questions about schools and  childcare: Call 818-015-1642 or 1-609.718.6363 (7 a.m. to 7 p.m.)       Reason for Disposition    [1] Taking antibiotic > 24 hours AND [2] cellulitis symptoms are WORSE (e.g., spreading redness, pain, swelling)    Additional Information    Negative: Shock suspected (e.g., cold/pale/clammy skin, too weak to stand, low BP, rapid pulse)    Negative: Sounds like a life-threatening emergency to the triager    Negative: SEVERE pain    Negative: [1] SEVERE pain with bending of finger (or toe) AND [2] cellulitis on hand (or foot)    Negative: Fever > 104 F (40 C)    Negative: [1] Widespread rash AND [2] bright red, sunburn-like    Negative: Black (necrotic), dark purple, or blisters develop in area of cellulitis    Negative: Patient sounds very sick or weak to the triager    Negative: [1] Taking antibiotic > 24 hours AND [2] fever > 100.5 F (38.1 C)    Negative: [1] Taking antibiotic > 24 hours AND [2] red streak (or line) runs from area of infection    Negative: [1] Fever > 100.0 F (37.8 C) AND [2] new onset    Negative: [1] Red streak runs from area of infection AND [2] new onset    Negative: [1] Caller has URGENT question AND [2] triager unable to answer question    Protocols used: CELLULITIS ON ANTIBIOTIC FOLLOW-UP CALL-A-

## 2021-06-13 NOTE — PROGRESS NOTES
Office Visit   Francisca Williamson   65 y.o. female    Date of Visit: 11/19/2020    Chief Complaint   Patient presents with     Abdominal Pain     Constipation for 1 week        Assessment and Plan   1. Diverticulitis of colon  Her symptoms and exam seem consistent with diverticulitis.  We discussed whether or not to get a CT and she is comfortable treating without a CT.  She has had diverticulitis in the past.  She will continue with her twice daily MiraLAX that she has been using for over a year.  I am also going to start Augmentin.  She has had difficulty tolerating Cipro and Flagyl in the past.  If she is not improving or her symptoms worsen she will let me know and then we would need to set her up for a CT scan.  - amoxicillin-clavulanate (AUGMENTIN) 875-125 mg per tablet; Take 1 tablet by mouth 2 (two) times a day for 10 days.  Dispense: 20 tablet; Refill: 0         Return if symptoms worsen or fail to improve.     History of Present Illness   This 65 y.o. old female comes in due to abdominal pain.  Over the last week she has had left lower quadrant abdominal pain.  She is also noticed constipation and difficulty having bowel movements.  She has not had any nausea or vomiting and no fevers.  She has had diverticulitis in the past and its been similar to this.  She has been using MiraLAX twice daily for constipation.  She has no other acute concerns today.    Review of Systems: As above, systems otherwise reviewed and negative.     Medications, Allergies and Problem List   Patient Active Problem List   Diagnosis     Celiac disease/sprue     GERD (gastroesophageal reflux disease)     Osteoporosis     Hyperlipidemia LDL goal <130     Diverticulosis of large intestine without hemorrhage     Osteopenia     B12 deficiency     BPV (benign positional vertigo)     History of colonic polyps     Current Outpatient Medications   Medication Sig Dispense Refill     cyanocobalamin (VITAMIN B-12) 1,000 mcg/mL injection Inject 1  "mL (1,000 mcg total) into the shoulder, thigh, or buttocks every 30 (thirty) days. 3 mL 3     ERGOCALCIFEROL, VITAMIN D2, (VITAMIN D2 ORAL) Take 2,000 Units by mouth daily.       estradioL (ESTRACE) 0.01 % (0.1 mg/gram) vaginal cream Insert 2 g into the vagina daily.       Lactobacillus rhamnosus GG (CULTURELLE) 10-15 Billion cell capsule Take 1 capsule by mouth daily.       polyethylene glycol (MIRALAX) 17 gram packet Take 17 g by mouth daily.       pravastatin (PRAVACHOL) 20 MG tablet Take 1 tablet (20 mg total) by mouth daily. 90 tablet 3     amoxicillin-clavulanate (AUGMENTIN) 875-125 mg per tablet Take 1 tablet by mouth 2 (two) times a day for 10 days. 20 tablet 0     No current facility-administered medications for this visit.      Allergies   Allergen Reactions     Alendronate Sodium      Other reaction(s): GI Upset     Gluten      Celiac disease     Keflex [Cephalexin] Other (See Comments)     10/9/2020 faint rash arms     Macrodantin [Nitrofurantoin Macrocrystalline] Rash          Physical Exam     /70 (Patient Site: Right Arm, Patient Position: Lying)   Pulse 78   Ht 5' 5.5\" (1.664 m)   Wt 162 lb (73.5 kg)   SpO2 99%   BMI 26.55 kg/m      General:  Patient is alert and in no apparent distress.  Gastrointestinal:  Abdomen is soft, non-distended, with no organomegaly, rebound or guarding.  She does have pain with deep palpation in the left lower quadrant.           Additional Information   Social History     Tobacco Use     Smoking status: Never Smoker     Smokeless tobacco: Never Used   Substance Use Topics     Alcohol use: Yes     Alcohol/week: 7.0 standard drinks     Types: 7 Glasses of wine per week     Drug use: No            Jelly Li MD    "

## 2021-06-13 NOTE — TELEPHONE ENCOUNTER
Please call her and offer her an appointment for me to evaluate her pain.  It could be that something else is going on so at this point an appointment would be best.  Thanks.

## 2021-06-14 NOTE — PROGRESS NOTES
Chief Complaint   Patient presents with     Urinary Tract Infection     painful, hurts after urination     Nausea     x 2 days on and off did have nausea with last uti       ASSESSMENT & PLAN:   Diagnoses and all orders for this visit:    UTI symptoms  -     Urinalysis-UC if Indicated    Burning with urination  -     Wet Prep, Vaginal        MDM:  End stream dysuria.  Negative wet prep and negative UA today.    Patient is already aware of changes related to menopause that can cause burning with urination.  Just restarted her estrogen cream.  Has refreshed.  Continue these.  May try Azo/Pyridium to see if helpful.    Avoid bladder irritants.  Recheck in 7 to 10 days if still present.    Supportive care discussed.  See discharge instructions below for specific recommendations given.    At the end of the encounter, I discussed results, diagnosis, medications. Discussed red flags for immediate return to clinic/ER, as well as indications for follow up if no improvement. Patient and/or caregiver understood and agreed to plan. Patient was stable for discharge.    SUBJECTIVE    HPI:  HPI  Francisca Williamson presents to the walk-in clinic with   Chief Complaint   Patient presents with     Urinary Tract Infection     painful, hurts after urination     Nausea     x 2 days on and off did have nausea with last uti     Burning at the end of her urinary stream.  Mild nausea.      With menopause, has 'dry tissue' and has been using A&D ointment, Rephresh, has been using estrogen cream - used last night.      Associated with: Notable allergies to Keflex and macrobid     Symptoms started: 2  Days ago     Known exposures: none     See ROS for additional symptoms and/or pertinent negatives.       History obtained from the patient.    Past Medical History:   Diagnosis Date     B12 deficiency      Celiac disease      Diverticulitis      Diverticulosis      GERD (gastroesophageal reflux disease)     states main problem is heartburn      Osteoporosis      Shingles 1/1/2005       Active Ambulatory (Non-Hospital) Problems    Diagnosis     Hyperlipidemia LDL goal <130     Diverticulosis of large intestine without hemorrhage     Osteopenia     Celiac disease/sprue     GERD (gastroesophageal reflux disease)     Osteoporosis     BPV (benign positional vertigo)     B12 deficiency     History of colonic polyps       Family History   Problem Relation Age of Onset     Osteoporosis Mother      Stroke Mother 74     Hyperlipidemia Father      Dementia Father      Breast cancer Maternal Grandmother        Social History     Tobacco Use     Smoking status: Never Smoker     Smokeless tobacco: Never Used   Substance Use Topics     Alcohol use: Yes     Alcohol/week: 7.0 standard drinks     Types: 7 Glasses of wine per week       Review of Systems   Gastrointestinal: Positive for abdominal pain (recent diverticulitis, no pain now) and nausea.       OBJECTIVE    Vitals:    12/25/20 0908   BP: 135/79   Pulse: 74   Temp: 97.4  F (36.3  C)   TempSrc: Oral   SpO2: 97%   Weight: 157 lb (71.2 kg)       Physical Exam  Constitutional:       General: She is not in acute distress.     Appearance: She is well-developed.   HENT:      Right Ear: External ear normal.      Left Ear: External ear normal.   Eyes:      General:         Right eye: No discharge.         Left eye: No discharge.      Conjunctiva/sclera: Conjunctivae normal.   Pulmonary:      Effort: Pulmonary effort is normal.   Musculoskeletal: Normal range of motion.   Skin:     General: Skin is warm and dry.      Capillary Refill: Capillary refill takes less than 2 seconds.   Neurological:      Mental Status: She is alert and oriented to person, place, and time.   Psychiatric:         Mood and Affect: Mood normal.         Behavior: Behavior normal.         Thought Content: Thought content normal.         Judgment: Judgment normal.         Labs:  Recent Results (from the past 240 hour(s))   HM2(CBC w/o Differential)    Urinalysis-UC if Indicated   Ref Range   Colorless, Yellow, Straw, Light Yellow   Clear   Negative   Negative   Negative, 60 mg/dL   1.001 - 1.030   Negative   4.5 - 8.0   Negative mg/dL   <2.0 E.U./dL, 2.0 E.U./dL   Negative   Negative   Urinalysis-UC if Indicated   Ref Range   Colorless, Yellow, Straw, Light Yellow   Clear   Negative   Negative   Negative   1.005 - 1.030   Negative   5.0 - 8.0   Negative mg/dL   0.2 E.U./dL, 1.0 E.U./dL   Negative   Negative   None Seen hpf   None Seen, 0-2 hpf   None Seen, 0-5 hpf   None Seen, 0-5 lpf   Wet Prep, Vaginal   Specimen: Genital   Ref Range   No yeast seen   No Trichomonas seen   No Clue cells seen         Radiology:    Echo Stress Exercise    Result Date: 11/20/2020    The stress echocardiogram is negative for inducible ischemia.   The stress electrocardiogram is negative for inducible ischemic EKG changes.   The estimated left ventricular ejection fraction is 65%.   The patient's exercise tolerance is mildly impaired.   No significant valvular abnormalities are noted on screening Doppler exam.   No previous study for comparison.      Ct Abdomen Pelvis Without Oral With Iv Contrast    Result Date: 12/21/2020  EXAM: CT ABDOMEN PELVIS WO ORAL W IV CONTRAST LOCATION: [facility] DATE/TIME: 12/21/2020 7:27 PM INDICATION: Abdominal pain, acute, nonlocalized. COMPARISON: 09/16/2019. TECHNIQUE: CT scan of the abdomen and pelvis was performed following injection of IV contrast. Multiplanar reformats were obtained. Dose reduction techniques were used. CONTRAST: Iohexol (Omni) 100 mL FINDINGS: LOWER CHEST: Normal. HEPATOBILIARY: Normal. PANCREAS: Normal. SPLEEN: Normal. ADRENAL GLANDS: Normal. KIDNEYS/BLADDER: Normal. BOWEL: Sigmoid colonic diverticulosis without signs of diverticulitis. LYMPH NODES: Normal. VASCULATURE: Unremarkable. PELVIC ORGANS: Normal. MUSCULOSKELETAL: There is fluid in the left iliopsoas bursa, new from previous.     1.  No  specific finding to explain the patient's abdominal pain. Sigmoid colonic diverticulosis without signs of diverticulitis. 2.  Small amount of fluid in the left iliopsoas bursa, new from previous.      PATIENT INSTRUCTIONS:   Patient Instructions   You may try Azo or pyridium (generic) for bladder pain, available at the pharmacy or Tylenol.      Otherwise, continue vaginal estrogen, avoid bladder irritants like excessive coffee, alcohol, tea, push water.    No yeast infection etc.  Urine is normal.

## 2021-06-14 NOTE — PATIENT INSTRUCTIONS - HE
You may try Azo or pyridium (generic) for bladder pain, available at the pharmacy or Tylenol.      Otherwise, continue vaginal estrogen, avoid bladder irritants like excessive coffee, alcohol, tea, push water.    No yeast infection etc.  Urine is normal.

## 2021-06-16 ENCOUNTER — THERAPY VISIT (OUTPATIENT)
Dept: PHYSICAL THERAPY | Facility: CLINIC | Age: 66
End: 2021-06-16
Payer: MEDICARE

## 2021-06-16 DIAGNOSIS — M62.89 PELVIC FLOOR DYSFUNCTION IN FEMALE: ICD-10-CM

## 2021-06-16 DIAGNOSIS — K56.41 OBSTRUCTIVE DEFECATION (H): ICD-10-CM

## 2021-06-16 PROCEDURE — 97110 THERAPEUTIC EXERCISES: CPT | Mod: GP | Performed by: PHYSICAL THERAPIST

## 2021-06-16 PROCEDURE — 97112 NEUROMUSCULAR REEDUCATION: CPT | Mod: GP | Performed by: PHYSICAL THERAPIST

## 2021-06-16 NOTE — PROGRESS NOTES
Francisca Williamson is a 65 y.o. female who is being evaluated via a billable video visit.      How would you like to obtain your AVS? MyChart.  If dropped from the video visit, the video invitation should be resent by: Text to cell phone: [phone]  Will anyone else be joining your video visit? No    Video Start Time: 3:56 PM    Assessment & Plan     Francisca was seen today for establish care.    Diagnoses and all orders for this visit:    B12 deficiency  On injection.     Hyperlipidemia LDL goal <130  No side effects. Refills given.  -     pravastatin (PRAVACHOL) 20 MG tablet; Take 1 tablet (20 mg total) by mouth daily.    Celiac disease/sprue    Diverticulosis of large intestine without hemorrhage  Current episode of abdominal pain. May need antibiotics if worsening. Pt will let me know in a few days. Was given augmentin previously by previous PCP.    Osteoporosis, unspecified osteoporosis type, unspecified pathological fracture presence  dexa 1/2021. Will discuss this in the future.     History of colonic polyps  UTD. Has seen GI before.     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    Dorota Horne MD  [facility]    Subjective   Francisca Williamson is 65 y.o. and presents today for the following health issues   HPI   Chief Complaint   Patient presents with     Establish Care     Hx of diverticulosis, constipation-taking Miralax BID, suppository PRN     Has been sent to GI for pelvic floor study  Currently is having abdominal pain.     Immunizations reviewed --- needs shingrix.   Colonosocopy reviewed--2/2021, repeat 5 years     Mammography reviewed-- last done with [facility]  [facility]   [facility] reviewed --[facility] at [facility].   Routine Dental and Eye care recommended  DEXA - 1/2021, repeat in 2 years,  has been treated before with IV reclast before x 3 years about 3-4 years ago.     Review of Systems  Negative except as above.      Objective    Vitals - Patient Reported  Weight (Patient Reported): 150 lb (68 kg)    Physical Exam  GENERAL: Healthy, alert and no distress  EYES: Eyes grossly normal to inspection. No discharge or erythema, or obvious scleral/conjunctival abnormalities.  RESP: No audible wheeze, cough, or visible cyanosis.  No visible retractions or increased work of breathing.    NEURO: Cranial nerves grossly intact. Mentation and speech appropriate for age.  PSYCH: Mentation appears normal, affect normal/bright, judgement and insight intact, normal speech and appearance well-groomed          Video-Visit Details    Type of service:  Video Visit    Video End Time (time video stopped): 4:18 PM  Originating Location (pt. Location): Home    Distant Location (provider location):  [facility] [place]     Platform used for Video Visit: [employer]

## 2021-06-17 ENCOUNTER — AMBULATORY - HEALTHEAST (OUTPATIENT)
Dept: ALLERGY | Facility: CLINIC | Age: 66
End: 2021-06-17

## 2021-06-17 ENCOUNTER — OFFICE VISIT - HEALTHEAST (OUTPATIENT)
Dept: ALLERGY | Facility: CLINIC | Age: 66
End: 2021-06-17

## 2021-06-17 ENCOUNTER — COMMUNICATION - HEALTHEAST (OUTPATIENT)
Dept: ALLERGY | Facility: CLINIC | Age: 66
End: 2021-06-17

## 2021-06-17 DIAGNOSIS — Z88.9 DRUG ALLERGY: ICD-10-CM

## 2021-06-17 DIAGNOSIS — Z88.1 ALLERGY TO CEPHALOSPORIN: ICD-10-CM

## 2021-06-17 NOTE — PATIENT INSTRUCTIONS - HE
In office Keflex challenge--2 hours, must be healthy    Clindamycin--not allergic reaction but may occur again    Macrodantin---nausea, changed in chart    Notify of further reactions

## 2021-06-17 NOTE — PROGRESS NOTES
"Francisca Williamson is a 65 y.o. female who is being evaluated via a billable video visit.      How would you like to obtain your AVS? mychart  If dropped from the video visit, the video invitation should be resent by: text [phone]  Will anyone else be joining your video visit? no      Video Start Time:1056    Subjective   Francisca Williamson is 65 y.o. and presents today for the following health issues   HPI chief complaint: Multiple antibiotic allergies    History of present illness: This is a pleasant 65-year-old woman I was asked to see for evaluation by Dr. Horne in regards to antibiotic allergies.  Patient states in October of last year, she received her flu shot.  She developed a large local swelling.  She states it was very hot and swollen.  She went to an urgent care.  She was told she might have cellulitis was started on Keflex.  After 3 days she developed a red bumpy rash on the back sides of her arms.  She states it was itchy.  She was then switched to clindamycin.  She developed then a rash on her chest.  This was not itchy but was red.  She then was discontinued off antibiotics.  She had no mucosal lesions or blisters.  No skin sloughing.  No breathing difficulty or gastrointestinal complaints.  She does have a history of Macrodantin allergy but this consisted of nausea only.  No hives, swelling or shortness of breath.  She denies any rash with this.  Of note, in November she tolerated a course of Augmentin.      Past medical history: Diverticulosis, hyperlipidemia, celiac disease, reflux, BPV, osteopenia    Social history: Never smoker    Family history: Noncontributory        Review of Systems  Review of Systems performed as above and the remainder is negative.      Objective    Vitals - Patient Reported  Weight (Patient Reported): 148 lb (67.1 kg)  Height (Patient Reported): 5' 5.5\" (166.4 cm)  BMI (Based on Pt Reported Ht/Wt): 24.25  Temperature (Patient Reported): 98.6  F (37  C)    Physical " Exam  Gen: Pleasant female not in acute distress  HEENT: Eyes no erythema of the bulbar or palpebral conjunctiva, no edema. Ears: No deformities or lesions nose: No congestion, Mouth: Throat clear, no lip or tongue edema.   Neck: No masses lesions or swelling  Respiratory: No coughing with breathing, speaking in full sentences, no retractions  Lymph: No visible supraclavicular or cervical lymphadenopathy  Skin: No rashes or lesions  Psych: Alert and oriented times 3            Impression report and plan:    1.  Adverse reaction to antibiotics    For Keflex, I recommended in office challenge.  She tolerated Augmentin so I do not think we need to do penicillin skin testing.  I do not think this is likely an IgE needed reaction.  However, it would be safest to do this in the office.  Went into the risks and benefits of challenge including anaphylaxis.  Patient understands this and agrees to proceed.  For clindamycin, the stomach and non-IgE mediated rash.  I did note this in her allergy profile.  Did note these rashes can recur.  For Macrodantin, I changed her profile to see nausea.  Would not be contraindication to giving this to her in the future.  We will contact the patient to set up the Keflex challenge.  She has reaction to vaccine in future, I would like to see the reaction.  This may have been a large local reaction.        Video-Visit Details    Type of service:  Video Visit    Video End Time (time video stopped): 1113  Originating Location (pt. Location): home    Distant Location (provider location):  [facility]     Platform used for Video Visit:[employer]

## 2021-06-18 NOTE — PATIENT INSTRUCTIONS - HE
Patient Instructions by Kevin Gibson MD at 5/29/2020  5:00 PM     Author: Kevin Gibson MD Service: -- Author Type: Physician    Filed: 5/29/2020  5:25 PM Encounter Date: 5/29/2020 Status: Signed    : Kevin Gibson MD (Physician)         Patient Education     Viral Pharyngitis (Sore Throat)    You or your child have pharyngitis (sore throat). This infection is caused by a virus. It can cause throat pain that is worse when swallowing, aching all over, headache, and fever. The infection may be spread by coughing, kissing, or touching others after touching your mouth or nose. Antibiotic medicines do not work against viruses. They are not used for treating this illness.  Home care    If symptoms are severe, you or your child should rest at home. Return to work or school when you or your child feel well enough.     You or your child should drink plenty of fluids to prevent dehydration.    Use throat lozenges or numbing throat sprays to help reduce pain. Gargling with warm salt water will also help reduce throat pain. Dissolve 1/2 teaspoon of salt in 1 glass of warm water. Children can sip on juice or a popsicle. Children 5 years and older can also suck on a lollipop or hard candy.    Dont eat salty or spicy foods or give them to your child. These can be irritating to the throat.  Medicines for a child: You can give your child acetaminophen for fever, fussiness, or discomfort. In babies over 6 months of age, you may use ibuprofen instead of acetaminophen. If your child has chronic liver or kidney disease or ever had a stomach ulcer or GI bleeding, talk with your tonie healthcare provider before giving these medicines. Aspirin should never be used by any child under 18 years of age who has a fever. It may cause severe liver damage.  Medicines for an adult: You may use acetaminophen or ibuprofen to control pain or fever, unless another medicine was prescribed for this. If you have chronic liver or  kidney disease or ever had a stomach ulcer or GI bleeding, talk with your healthcare provider before using these medicines.  Follow-up care  Follow up with a healthcare provider or our staff if you or your child are not getting better over the next week.  When to seek medical advice  Call your healthcare provider right away if any of these occur:    Fever as directed by your healthcare provider.  For children, seek care if:  ? Your child is of any age and has repeated fevers above 104 F (40 C).  ? Your child is younger than 2 years of age and has a fever of 100.4 F (38 C) for more than 1 day.  ? Your child is 2 years old or older and has a fever of 100.4 F (38 C) for more than 3 days.    New or worsening ear pain, sinus pain, or headache    Painful lumps in the back of neck    Stiff neck    Lymph nodes are getting larger    Cant swallow liquids, a lot of drooling, or cant open mouth wide due to throat pain    Signs of dehydration, such as very dark urine or no urine, sunken eyes, dizziness    Trouble breathing or noisy breathing    Muffled voice    New rash    Other symptoms are getting worse  Date Last Reviewed: 10/1/2017    9354-1504 [employer]. [address] [place] [address], [place] [address]. All rights reserved. This information is not intended as a substitute for professional medical care. Always follow your healthcare professional's instructions.

## 2021-06-18 NOTE — PATIENT INSTRUCTIONS - HE
Patient Instructions by Jelly Li MD at 10/6/2020  8:40 AM     Author: Jelly Li MD Service: -- Author Type: Physician    Filed: 10/6/2020  8:56 AM Encounter Date: 10/6/2020 Status: Signed    : Jelly Li MD (Physician)         Patient Education   Alcohol Use   Many people can enjoy a glass of wine or beer without any negative consequences to their health. According to the Centers for Disease Control and Prevention (CDC), having one or fewer drinks per day for women and two or fewer per day for men is considered moderate drinking.     When people drink more than moderately, it can become concerning. Excessive drinking is defined as consuming 15 drinks or more per week for men and 8 drinks or more per week for women. There are various health problems associated with excessive drinking, which include:    Damage to vital organs like the heart, brain, liver and pancreas    Harm to the digestive tract    Weaken the immune system    Higher risk for heart disease and cancer       Patient Education   Signs of Hearing Loss  Hearing loss is a problem shared by many people. In fact, it is one of the most common health conditions, particularly as people age. Most people over age 65 have some hearing loss, and by age 80, almost everyone does. Because hearing loss usually occurs slowly over the years, you may not realize your hearing ability has gotten worse.       Have your hearing checked  Contact your [facility] care provider if you:    Have to strain to hear normal conversation.    Have to watch other peoples faces very carefully to follow what theyre saying.    Need to ask people to repeat what theyve said.    Often misunderstand what people are saying.    Turn the volume of the television or radio up so high that others complain.    Feel that people are mumbling when theyre talking to you.    Find that the effort to hear leaves you feeling tired and irritated.    Notice, when using the phone, that  you hear better with 1 ear than the other.    [id number] [employer]. [address] [place] [address], [place] [address]. All rights reserved. This information is not intended as a substitute for professional medical care. Always follow your healthcare professional's instructions.           Advance Directive  Patients advance directive was discussed and I am comfortable with the patients wishes.  Patient Education   Personalized Prevention Plan  You are due for the preventive services outlined below.  Your care team is available to assist you in scheduling these services.  If you have already completed any of these items, please share that information with your care team to update in your medical record.  Health Maintenance   Topic Date Due   ? HEPATITIS C SCREENING  1955   ? LIPID  07/02/1998   ? ZOSTER VACCINES (1 of 2) 07/02/2003   ? Pneumococcal Vaccine: 65+ Years (1 of 1 - PPSV23) 07/02/2018   ? DXA SCAN  07/02/2018   ? INFLUENZA VACCINE RULE BASED (1) 08/01/2020   ? MEDICARE ANNUAL WELLNESS VISIT  10/06/2021   ? FALL RISK ASSESSMENT  10/06/2021   ? MAMMOGRAM  07/13/2022   ? TD 18+ HE  06/10/2023   ? ADVANCE CARE PLANNING  10/06/2025   ? COLORECTAL CANCER SCREENING  03/02/2027   ? Pneumococcal Vaccine: Pediatrics (0 to 5 Years) and At-Risk Patients (6 to 64 Years)  Aged Out   ? HEPATITIS B VACCINES  Aged Out

## 2021-06-20 NOTE — LETTER
Letter by Viola Rendon RN at      Author: Viola Rendon RN Service: -- Author Type: --    Filed:  Encounter Date: 5/29/2020 Status: (Other)       5/29/2020        Francisca Williamson  [address] [place] [address]    This letter provides a written record that you were tested for COVID-19 on 5/27/20.     Your result was negative.    This means that we didnt find the virus that causes COVID-19 in your sample. A test may show negative when you do actually have the virus. This can happen when the virus is in the early stages of infection, before you feel illness symptoms.    Even if you dont have symptoms, they may still appear. For safety, its very important to follow these rules.    Keep yourself away from others (self-isolation):      Stay home. Dont go to work, school or anywhere else.     Stay in your own room (and use your own bathroom), if you can.    Stay away from others in your home. No hugging, kissing or shaking hands. No visitors.    Clean high touch surfaces often (doorknobs, counters, handles, etc.). Use a household cleaning spray or wipes.    Cover your mouth and nose with a mask, tissue or washcloth to avoid spreading germs.    Wash your hands and face often with soap and water.    Stay in self-isolation until you meet ALL of the guidelines below:    1. You have had no fever for at least 72 hours (that is 3 full days of no fever without the use of medicine that reduces fevers), AND  2. other symptoms (such as cough, shortness of breath) have gotten better, AND  3. at least 10 days have passed since your symptoms first appeared.    Going back to work  Check with your employer for any guidelines to follow for going back to work.    Employers: This document serves as formal notice that your employee tested negative for COVID-19, as of the testing date shown above.    For questions regarding this letter or your Negative COVID-19 result, call [phone] between 8A to 6:30P (M-F) and 10A to  6:30P (weekends).

## 2021-06-21 NOTE — LETTER
Letter by Caridad Bliss MD at      Author: Caridad Bliss MD Service: -- Author Type: --    Filed:  Encounter Date: 5/3/2021 Status: (Other)         Dorota Horne MD  [address]  [facility] [address]                                  May 3, 2021    Patient: Francisca Williamson   MR Number: [id number]   YOB: 1955   Date of Visit: 5/3/2021     Dear Dr. Ozzie MD:    Thank you for referring Francisca Williamson to me for evaluation.  Recommended in office challenge to Keflex.  The other reactions sound non-IgE mediated.  I have include my note for your review.      If you have questions, please do not hesitate to call me.     Sincerely,        Caridad Bliss MD          CC  No Recipients  Caridad Bliss MD  5/3/2021 12:50 PM  Sign when Signing Visit  Francisca Williamson is a 65 y.o. female who is being evaluated via a billable video visit.      How would you like to obtain your AVS? mychart  If dropped from the video visit, the video invitation should be resent by: text [phone]  Will anyone else be joining your video visit? no      Video Start Time:1056    Subjective   Francisca Williamson is 65 y.o. and presents today for the following health issues   HPI chief complaint: Multiple antibiotic allergies    History of present illness: This is a pleasant 65-year-old woman I was asked to see for evaluation by Dr. Horne in regards to antibiotic allergies.  Patient states in October of last year, she received her flu shot.  She developed a large local swelling.  She states it was very hot and swollen.  She went to an urgent care.  She was told she might have cellulitis was started on Keflex.  After 3 days she developed a red bumpy rash on the back sides of her arms.  She states it was itchy.  She was then switched to clindamycin.  She developed then a rash on her chest.  This was not itchy but was red.  She then was discontinued off antibiotics.  She had no mucosal lesions or blisters.  No skin  "sloughing.  No breathing difficulty or gastrointestinal complaints.  She does have a history of Macrodantin allergy but this consisted of nausea only.  No hives, swelling or shortness of breath.  She denies any rash with this.  Of note, in November she tolerated a course of Augmentin.      Past medical history: Diverticulosis, hyperlipidemia, celiac disease, reflux, BPV, osteopenia    Social history: Never smoker    Family history: Noncontributory        Review of Systems  Review of Systems performed as above and the remainder is negative.      Objective    Vitals - Patient Reported  Weight (Patient Reported): 148 lb (67.1 kg)  Height (Patient Reported): 5' 5.5\" (166.4 cm)  BMI (Based on Pt Reported Ht/Wt): 24.25  Temperature (Patient Reported): 98.6  F (37  C)    Physical Exam  Gen: Pleasant female not in acute distress  HEENT: Eyes no erythema of the bulbar or palpebral conjunctiva, no edema. Ears: No deformities or lesions nose: No congestion, Mouth: Throat clear, no lip or tongue edema.   Neck: No masses lesions or swelling  Respiratory: No coughing with breathing, speaking in full sentences, no retractions  Lymph: No visible supraclavicular or cervical lymphadenopathy  Skin: No rashes or lesions  Psych: Alert and oriented times 3            Impression report and plan:    1.  Adverse reaction to antibiotics    For Keflex, I recommended in office challenge.  She tolerated Augmentin so I do not think we need to do penicillin skin testing.  I do not think this is likely an IgE needed reaction.  However, it would be safest to do this in the office.  Went into the risks and benefits of challenge including anaphylaxis.  Patient understands this and agrees to proceed.  For clindamycin, the stomach and non-IgE mediated rash.  I did note this in her allergy profile.  Did note these rashes can recur.  For Macrodantin, I changed her profile to see nausea.  Would not be contraindication to giving this to her in the future.  " We will contact the patient to set up the Keflex challenge.  She has reaction to vaccine in future, I would like to see the reaction.  This may have been a large local reaction.        Video-Visit Details    Type of service:  Video Visit    Video End Time (time video stopped): 1113  Originating Location (pt. Location): home    Distant Location (provider location):  [facility]     Platform used for Video Visit:kaelynwell

## 2021-06-25 NOTE — TELEPHONE ENCOUNTER
LM to  her keflex RX at the pharmacy downstairs. Arrive 15 minutes early to be able to get that and bring up to her appt.

## 2021-06-26 NOTE — PROGRESS NOTES
Subjective   Francisca Williamson is 65 y.o. and presents today for the following health issues   HPI chief complaint: Drug allergy    History of present illness: This is a pleasant 65-year-old woman who is here today for drug allergy.  She is to undergo a Keflex challenge.  She had a possible allergic reaction in the fall of last year to Keflex that consisted of an itchy rash.  This occurred after receiving the flu shot.  She reports she is feeling well.  No cough, wheeze, shortness of breath, nasal congestion or skin rash.          Review of Systems  10 point review of Systems performed as above and the remainder is negative.      Objective    /60   Pulse 69   SpO2 96%   There is no height or weight on file to calculate BMI.  Physical Exam  Gen: Pleasant female not in acute distress  HEENT: Eyes no erythema of the bulbar or palpebral conjunctiva, no edema. . Nose: No congestion, Mouth: Throat clear, no lip or tongue edema.   Cardiac: Regular rate and rhythm, no murmurs, rubs or gallops  Respiratory: Clear to auscultation bilaterally, no adventitious breath sounds    Skin: No rashes or lesions  Psych: Alert and oriented times 3      Last Penicillin (drug) Allergy Test Results  Oral Challenge  Antibiotic/Concentration: Keflex 125mg/5ml (06/17/21 1538)  1/100 Dose: .2ml 1317 (06/17/21 1538)  1/10 Dose: 2ml 1349 (06/17/21 1538)  Full Dose: 20ml 1435 (06/17/21 1538)  Observation: 2 hrs (06/17/21 1538)      Impression report and plan:  1.  Keflex allergy    Patient has a 2-6% chance of having an IgE mediated reaction to cephalosporin antibiotic.  This does not predict non-IgE mediated reactions.    Time spent with patient, chart review and documentation, 20 minutes on date of service.

## 2021-06-26 NOTE — PATIENT INSTRUCTIONS - HE
2-6% chance of allergic reaction to a cephalosporin antibiotic    Does not predict for non-allergic reactions

## 2021-06-28 ENCOUNTER — THERAPY VISIT (OUTPATIENT)
Dept: PHYSICAL THERAPY | Facility: CLINIC | Age: 66
End: 2021-06-28
Payer: MEDICARE

## 2021-06-28 DIAGNOSIS — K56.41 OBSTRUCTIVE DEFECATION (H): ICD-10-CM

## 2021-06-28 DIAGNOSIS — M62.89 PELVIC FLOOR DYSFUNCTION IN FEMALE: ICD-10-CM

## 2021-06-28 PROCEDURE — 97110 THERAPEUTIC EXERCISES: CPT | Mod: GP | Performed by: PHYSICAL THERAPIST

## 2021-06-28 PROCEDURE — 97112 NEUROMUSCULAR REEDUCATION: CPT | Mod: GP | Performed by: PHYSICAL THERAPIST

## 2021-07-01 ENCOUNTER — COMMUNICATION - HEALTHEAST (OUTPATIENT)
Dept: FAMILY MEDICINE | Facility: CLINIC | Age: 66
End: 2021-07-01

## 2021-07-04 NOTE — TELEPHONE ENCOUNTER
Telephone Encounter by Dorota Horne MD at 7/2/2021  8:25 AM     Author: Dorota Horne MD Service: -- Author Type: Physician    Filed: 7/2/2021  8:25 AM Encounter Date: 6/25/2021 Status: Signed    : Dorota Horne MD (Physician)       Please let patient know diclofenac not covered by insurance. She can purchase over the counter diclofenac as well.

## 2021-07-04 NOTE — TELEPHONE ENCOUNTER
Telephone Encounter by Ami Nichols MA at 7/2/2021  9:27 AM     Author: Ami Nichols MA Service: -- Author Type: Certified Medical Assistant    Filed: 7/2/2021  9:27 AM Encounter Date: 6/25/2021 Status: Signed    : Ami Nichols MA (Certified Medical Assistant)       Pt notified

## 2021-07-04 NOTE — TELEPHONE ENCOUNTER
Telephone Encounter by Estelle Paez at 7/1/2021  8:43 AM     Author: Estelle Paez Service: -- Author Type: --    Filed: 7/1/2021  8:44 AM Encounter Date: 6/25/2021 Status: Signed    : Estelle Paez       PRIOR AUTHORIZATION DENIED    Denial Rational: Medication is not covered for diagnosis.            Appeal Information: If provider would like to appeal please provide a letter of medical necessity and route back to the PA team.

## 2021-07-06 ENCOUNTER — THERAPY VISIT (OUTPATIENT)
Dept: PHYSICAL THERAPY | Facility: CLINIC | Age: 66
End: 2021-07-06
Payer: MEDICARE

## 2021-07-06 ENCOUNTER — COMMUNICATION - HEALTHEAST (OUTPATIENT)
Dept: FAMILY MEDICINE | Facility: CLINIC | Age: 66
End: 2021-07-06

## 2021-07-06 VITALS
SYSTOLIC BLOOD PRESSURE: 121 MMHG | DIASTOLIC BLOOD PRESSURE: 68 MMHG | TEMPERATURE: 98 F | RESPIRATION RATE: 14 BRPM | HEART RATE: 58 BPM | OXYGEN SATURATION: 100 %

## 2021-07-06 DIAGNOSIS — M62.89 PELVIC FLOOR DYSFUNCTION IN FEMALE: ICD-10-CM

## 2021-07-06 DIAGNOSIS — K56.41 OBSTRUCTIVE DEFECATION (H): ICD-10-CM

## 2021-07-06 DIAGNOSIS — M85.80 OSTEOPENIA, UNSPECIFIED LOCATION: ICD-10-CM

## 2021-07-06 PROCEDURE — 97112 NEUROMUSCULAR REEDUCATION: CPT | Mod: GP | Performed by: PHYSICAL THERAPIST

## 2021-07-06 PROCEDURE — 97110 THERAPEUTIC EXERCISES: CPT | Mod: GP | Performed by: PHYSICAL THERAPIST

## 2021-07-06 NOTE — TELEPHONE ENCOUNTER
Telephone Encounter by Maggie Plaza at 7/6/2021  3:26 PM     Author: Maggie Plaza Service: -- Author Type: --    Filed: 7/6/2021  3:27 PM Encounter Date: 7/6/2021 Status: Signed    : Maggie Plaza       Reason for Call:  Medication or medication refill:    Do you use a [facility]?  Name of the pharmacy and phone number for the current request: [id number] [address] AT [facility]    Name of the medication requested: cyanocobalamin (VITAMIN B-12) 1,000 mcg/mL injection    Other request: na    Can we leave a detailed message on this number? Yes    Phone number patient can be reached at: Cell number on file:    Telephone Information:   Mobile [phone]       Best Time: na    Call taken on 7/6/2021 at 3:26 PM by Maggie Plaza

## 2021-07-07 NOTE — TELEPHONE ENCOUNTER
Notification from [facility] for     Disp Refills Start End MEGHAN   diclofenac sodium (VOLTAREN) 1 % Gel 100 g 0 6/21/2021  --   Sig - Route: Apply 2 g topically 4 (four) times a day. Apply to chest - Topical   Sent to pharmacy as: diclofenac 1 % topical gel (VOLTAREN)     Message from Pharmacy  Plan does not cover this medication.  Please call plan at [phone] to initiate prior authorization or call/fax pharmacy to change medication.    Patient ID [id number]

## 2021-07-07 NOTE — TELEPHONE ENCOUNTER
Telephone Encounter by Gena Palacio RN at 7/6/2021 11:35 PM     Author: Gena Palacio RN Service: -- Author Type: Registered Nurse    Filed: 7/6/2021 11:35 PM Encounter Date: 7/6/2021 Status: Signed    : Gena Palacio RN (Registered Nurse)       RN cannot approve Refill Request    RN can NOT refill this medication med is not covered by policy/route to provider and Needs new script from current PCP. Old prescriber is no longer here.. Last office visit: 6/21/2021 Dorota Horne MD Last Physical: Visit date not found Last MTM visit: Visit date not found Last visit same specialty: 6/21/2021 Dorota Horne MD.  Next visit within 3 mo: Visit date not found  Next physical within 3 mo: Visit date not found      [facility] Connection Triage/Med Refill 7/6/2021    Requested Prescriptions   Pending Prescriptions Disp Refills   ? cyanocobalamin (VITAMIN B-12) 1,000 mcg/mL injection 3 mL 3     Sig: Inject 1 mL (1,000 mcg total) into the shoulder, thigh, or buttocks every 30 (thirty) days.       Cyanocobalamin (Vitamin B12)  Refill Protocol Passed - 7/6/2021  3:33 PM        Passed - PCP or prescribing provider visit in past 12 months       Last office visit with prescriber/PCP: 6/21/2021 Dorota Horne MD OR same dept: 6/21/2021 Dorota Horne MD OR same specialty: 6/21/2021 Dorota Horne MD Last physical: Visit date not found Last MTM visit: Visit date not found    Next visit within 3 mo: Visit date not found  Next physical within 3 mo: Visit date not found  Prescriber OR PCP: Dorota Horne MD  Last diagnosis associated with med order: 1. Osteopenia, unspecified location  - cyanocobalamin (VITAMIN B-12) 1,000 mcg/mL injection; Inject 1 mL (1,000 mcg total) into the shoulder, thigh, or buttocks every 30 (thirty) days.  Dispense: 3 mL; Refill: 3               Passed - Vitamin B12 level in last 12 months     Vitamin B-12   Date Value Ref Range Status   06/21/2021 426 213 - 816 pg/mL  Final             Passed - CBC in last 12 months     WBC   Date Value Ref Range Status   06/21/2021 6.1 4.0 - 11.0 thou/uL Final     RBC   Date Value Ref Range Status   06/21/2021 4.69 3.80 - 5.40 mill/uL Final     Hemoglobin   Date Value Ref Range Status   06/21/2021 13.4 12.0 - 16.0 g/dL Final     Hematocrit   Date Value Ref Range Status   06/21/2021 41.9 35.0 - 47.0 % Final     MCV   Date Value Ref Range Status   06/21/2021 89 80 - 100 fL Final     MCH   Date Value Ref Range Status   06/21/2021 28.6 27.0 - 34.0 pg Final     MCHC   Date Value Ref Range Status   06/21/2021 32.0 32.0 - 36.0 g/dL Final     RDW   Date Value Ref Range Status   06/21/2021 13.9 11.0 - 14.5 % Final     Platelets   Date Value Ref Range Status   06/21/2021 232 140 - 440 thou/uL Final     MPV   Date Value Ref Range Status   06/21/2021 9.8 7.0 - 10.0 fL Final

## 2021-07-07 NOTE — TELEPHONE ENCOUNTER
Central PA team  [phone]  Pool: HE PA MED ([id number])          PA has been initiated.       PA form completed and faxed insurance via Cover My Meds     Key:  [id number]     Medication:    Diclofenac Sodium 1% gel    Insurance:  [employer]        Response will be received via fax and may take up to 5-10 business days depending on plan

## 2021-07-13 ENCOUNTER — THERAPY VISIT (OUTPATIENT)
Dept: PHYSICAL THERAPY | Facility: CLINIC | Age: 66
End: 2021-07-13
Payer: MEDICARE

## 2021-07-13 DIAGNOSIS — K56.41 OBSTRUCTIVE DEFECATION (H): ICD-10-CM

## 2021-07-13 DIAGNOSIS — M62.89 PELVIC FLOOR DYSFUNCTION IN FEMALE: ICD-10-CM

## 2021-07-13 PROCEDURE — 97112 NEUROMUSCULAR REEDUCATION: CPT | Mod: GP | Performed by: PHYSICAL THERAPIST

## 2021-07-13 PROCEDURE — 97110 THERAPEUTIC EXERCISES: CPT | Mod: GP | Performed by: PHYSICAL THERAPIST

## 2021-07-13 RX ORDER — CYANOCOBALAMIN 1000 UG/ML
1000 INJECTION, SOLUTION INTRAMUSCULAR; SUBCUTANEOUS
Status: CANCELLED | OUTPATIENT
Start: 2021-07-13

## 2021-07-14 DIAGNOSIS — J02.9 SORE THROAT: ICD-10-CM

## 2021-07-14 DIAGNOSIS — M62.89 PELVIC FLOOR DYSFUNCTION IN FEMALE: Primary | ICD-10-CM

## 2021-07-14 DIAGNOSIS — Z86.19 H/O COLD SORES: ICD-10-CM

## 2021-07-14 NOTE — TELEPHONE ENCOUNTER
Spoke to pt to clarify valtrex dosing as we do not have that listed. 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 Rx's pended

## 2021-07-14 NOTE — TELEPHONE ENCOUNTER
Reason for Call:  Medication or medication refill:    Do you use a [facility]?  Name of the pharmacy and phone number for the current request:  [facility] in [place] on [address]    Name of the medication requested:   - estradiol - 0.1 mg vaginal cream  - valacyclovir 500 mg    Other request: Patient called stating she est care with Dr. Horne in April and saw her not too long ago but didn't need these at the time. These were prescribed by her OB/GYN awhile back but pt is wondering if provider can send in a refill since she est care with Dr. Horne. Pt would like a call back so she knows.    Can we leave a detailed message on this number? YES    Phone number patient can be reached at: Cell number on file:    Telephone Information:   Mobile [phone]       Best Time: any time    Call taken on 7/14/2021 at 2:41 PM by Yoel Joyce

## 2021-07-15 ENCOUNTER — TRANSFERRED RECORDS (OUTPATIENT)
Dept: HEALTH INFORMATION MANAGEMENT | Facility: CLINIC | Age: 66
End: 2021-07-15

## 2021-07-15 NOTE — TELEPHONE ENCOUNTER
"Last Written Prescription Date:  7/9/2021  Last Fill Quantity: 3,  # refills: 3  Last office visit provider:  4/8/2021     Requested Prescriptions   Pending Prescriptions Disp Refills     cyanocobalamin (CYANOCOBALAMIN) 1000 MCG/ML injection       Sig: Inject 1 mL (1,000 mcg) into the muscle       Vitamin Supplements (Adult) Protocol Passed - 7/13/2021  2:58 PM        Passed - High dose Vitamin D not ordered        Passed - Recent (12 mo) or future (30 days) visit within the authorizing provider's specialty     Patient has had an office visit with the authorizing provider or a provider within the authorizing providers department within the previous 12 mos or has a future within next 30 days. See \"Patient Info\" tab in inbasket, or \"Choose Columns\" in Meds & Orders section of the refill encounter.              Passed - Medication is active on med list             Yung Castillo RN 07/15/21 2:10 PM  Outpatient Medication Detail     Disp Refills Start End MEGHAN   cyanocobalamin (VITAMIN B-12) 1,000 mcg/mL injection 3 mL 3 7/9/2021  No   Sig - Route: Inject 1 mL (1,000 mcg total) into the shoulder, thigh, or buttocks every 30 (thirty) days. - Intramuscular   Sent to pharmacy as: cyanocobalamin (vit B-12) 1,000 mcg/mL injection solution   E-Prescribing Status: Receipt confirmed by pharmacy (7/9/2021 11:56 AM CDT)   cyanocobalamin (VITAMIN B-12) 1,000 mcg/mL injection [[id number]]    Electronically signed by: Dorota Horne MD on 07/09/21 1156 Status: Active   Ordering user: Dorota Horne MD 07/09/21 1156 Authorized by: Dorota Horne MD   Frequency: Q30 Days 07/09/21 - Until Discontinued Released by: Dorota Horne MD 07/09/21 1156   Diagnoses  Osteopenia, unspecified location [M85.80]       "

## 2021-07-16 RX ORDER — VALACYCLOVIR HYDROCHLORIDE 500 MG/1
500 TABLET, FILM COATED ORAL 2 TIMES DAILY
Qty: 6 TABLET | Refills: 3 | Status: SHIPPED | OUTPATIENT
Start: 2021-07-16 | End: 2022-07-28

## 2021-07-16 RX ORDER — ESTRADIOL 0.1 MG/G
2 CREAM VAGINAL
Qty: 42.5 G | Refills: 1 | Status: SHIPPED | OUTPATIENT
Start: 2021-07-16 | End: 2021-12-30

## 2021-07-18 ENCOUNTER — HOSPITAL ENCOUNTER (OUTPATIENT)
Dept: CT IMAGING | Facility: HOSPITAL | Age: 66
Discharge: HOME | End: 2021-07-18
Attending: STUDENT IN AN ORGANIZED HEALTH CARE EDUCATION/TRAINING PROGRAM | Admitting: STUDENT IN AN ORGANIZED HEALTH CARE EDUCATION/TRAINING PROGRAM
Payer: MEDICARE

## 2021-07-18 ENCOUNTER — OFFICE VISIT (OUTPATIENT)
Dept: FAMILY MEDICINE | Facility: CLINIC | Age: 66
End: 2021-07-18
Payer: MEDICARE

## 2021-07-18 VITALS
HEART RATE: 74 BPM | DIASTOLIC BLOOD PRESSURE: 73 MMHG | OXYGEN SATURATION: 97 % | TEMPERATURE: 98.1 F | RESPIRATION RATE: 16 BRPM | SYSTOLIC BLOOD PRESSURE: 112 MMHG

## 2021-07-18 DIAGNOSIS — R10.32 LLQ ABDOMINAL PAIN: Primary | ICD-10-CM

## 2021-07-18 DIAGNOSIS — R10.32 LLQ ABDOMINAL PAIN: ICD-10-CM

## 2021-07-18 DIAGNOSIS — K57.30 DIVERTICULOSIS OF LARGE INTESTINE WITHOUT HEMORRHAGE: ICD-10-CM

## 2021-07-18 LAB
ALBUMIN SERPL-MCNC: 4.4 G/DL (ref 3.5–5)
ALBUMIN UR-MCNC: NEGATIVE MG/DL
ALP SERPL-CCNC: 73 U/L (ref 45–120)
ALT SERPL W P-5'-P-CCNC: 15 U/L (ref 0–45)
ANION GAP SERPL CALCULATED.3IONS-SCNC: 10 MMOL/L (ref 5–18)
APPEARANCE UR: CLEAR
AST SERPL W P-5'-P-CCNC: 22 U/L (ref 0–40)
BACTERIA #/AREA URNS HPF: ABNORMAL /HPF
BASOPHILS # BLD AUTO: 0 10E3/UL (ref 0–0.2)
BASOPHILS NFR BLD AUTO: 0 %
BILIRUB SERPL-MCNC: 0.3 MG/DL (ref 0–1)
BILIRUB UR QL STRIP: NEGATIVE
BUN SERPL-MCNC: 11 MG/DL (ref 8–22)
CALCIUM SERPL-MCNC: 9.7 MG/DL (ref 8.5–10.5)
CHLORIDE BLD-SCNC: 106 MMOL/L (ref 98–107)
CO2 SERPL-SCNC: 25 MMOL/L (ref 22–31)
COLOR UR AUTO: YELLOW
CREAT SERPL-MCNC: 0.92 MG/DL (ref 0.6–1.1)
EOSINOPHIL # BLD AUTO: 0.1 10E3/UL (ref 0–0.7)
EOSINOPHIL NFR BLD AUTO: 2 %
ERYTHROCYTE [DISTWIDTH] IN BLOOD BY AUTOMATED COUNT: 13.9 % (ref 10–15)
GFR SERPL CREATININE-BSD FRML MDRD: 64 ML/MIN/1.73M2
GLUCOSE BLD-MCNC: 102 MG/DL (ref 70–125)
GLUCOSE UR STRIP-MCNC: NEGATIVE MG/DL
HCT VFR BLD AUTO: 42.7 % (ref 35–47)
HGB BLD-MCNC: 13.9 G/DL (ref 11.7–15.7)
HGB UR QL STRIP: ABNORMAL
IMM GRANULOCYTES # BLD: 0 10E3/UL
IMM GRANULOCYTES NFR BLD: 0 %
KETONES UR STRIP-MCNC: NEGATIVE MG/DL
LEUKOCYTE ESTERASE UR QL STRIP: NEGATIVE
LYMPHOCYTES # BLD AUTO: 1.2 10E3/UL (ref 0.8–5.3)
LYMPHOCYTES NFR BLD AUTO: 20 %
MCH RBC QN AUTO: 29.2 PG (ref 26.5–33)
MCHC RBC AUTO-ENTMCNC: 32.6 G/DL (ref 31.5–36.5)
MCV RBC AUTO: 90 FL (ref 78–100)
MONOCYTES # BLD AUTO: 0.5 10E3/UL (ref 0–1.3)
MONOCYTES NFR BLD AUTO: 8 %
NEUTROPHILS # BLD AUTO: 4.4 10E3/UL (ref 1.6–8.3)
NEUTROPHILS NFR BLD AUTO: 71 %
NITRATE UR QL: NEGATIVE
PH UR STRIP: 8.5 [PH] (ref 5–8)
PLATELET # BLD AUTO: 234 10E3/UL (ref 150–450)
POTASSIUM BLD-SCNC: 3.8 MMOL/L (ref 3.5–5)
PROT SERPL-MCNC: 7.7 G/DL (ref 6–8)
RBC # BLD AUTO: 4.76 10E6/UL (ref 3.8–5.2)
RBC #/AREA URNS AUTO: ABNORMAL /HPF
SODIUM SERPL-SCNC: 141 MMOL/L (ref 136–145)
SP GR UR STRIP: 1.01 (ref 1–1.03)
SQUAMOUS #/AREA URNS AUTO: ABNORMAL /LPF
UROBILINOGEN UR STRIP-ACNC: 0.2 E.U./DL
WBC # BLD AUTO: 6.2 10E3/UL (ref 4–11)
WBC #/AREA URNS AUTO: ABNORMAL /HPF

## 2021-07-18 PROCEDURE — 74177 CT ABD & PELVIS W/CONTRAST: CPT

## 2021-07-18 PROCEDURE — 85025 COMPLETE CBC W/AUTO DIFF WBC: CPT | Performed by: STUDENT IN AN ORGANIZED HEALTH CARE EDUCATION/TRAINING PROGRAM

## 2021-07-18 PROCEDURE — 36415 COLL VENOUS BLD VENIPUNCTURE: CPT | Performed by: STUDENT IN AN ORGANIZED HEALTH CARE EDUCATION/TRAINING PROGRAM

## 2021-07-18 PROCEDURE — 80053 COMPREHEN METABOLIC PANEL: CPT | Performed by: STUDENT IN AN ORGANIZED HEALTH CARE EDUCATION/TRAINING PROGRAM

## 2021-07-18 PROCEDURE — 99214 OFFICE O/P EST MOD 30 MIN: CPT | Performed by: STUDENT IN AN ORGANIZED HEALTH CARE EDUCATION/TRAINING PROGRAM

## 2021-07-18 PROCEDURE — 250N000011 HC RX IP 250 OP 636: Performed by: STUDENT IN AN ORGANIZED HEALTH CARE EDUCATION/TRAINING PROGRAM

## 2021-07-18 PROCEDURE — 81001 URINALYSIS AUTO W/SCOPE: CPT | Performed by: STUDENT IN AN ORGANIZED HEALTH CARE EDUCATION/TRAINING PROGRAM

## 2021-07-18 RX ORDER — POLYETHYLENE GLYCOL 3350 17 G/17G
17 POWDER, FOR SOLUTION ORAL
COMMUNITY
Start: 2020-03-30

## 2021-07-18 RX ORDER — DICYCLOMINE HYDROCHLORIDE 10 MG/1
CAPSULE ORAL
COMMUNITY
Start: 2021-01-13 | End: 2021-10-04

## 2021-07-18 RX ORDER — IOPAMIDOL 755 MG/ML
100 INJECTION, SOLUTION INTRAVASCULAR ONCE
Status: COMPLETED | OUTPATIENT
Start: 2021-07-18 | End: 2021-07-18

## 2021-07-18 RX ADMIN — IOPAMIDOL 100 ML: 755 INJECTION, SOLUTION INTRAVENOUS at 16:13

## 2021-07-18 NOTE — PROGRESS NOTES
"    Assessment & Plan     LLQ abdominal pain  Diverticulosis of large intestine without hemorrhage  She has a history of diverticulitis and left lower quadrant pain that is concerning for possible diverticulitis. On exam she doesn't have any signs of a hernia and is tender to palpation in the right lower quadrant. Her WBC is not elevated, which is reassuring that this not infectious in nature and her CT did not show signs of diverticulitis, but did show large diverticulosis. Her UA did not have any signs of infection on it. She has a history of GI issues and follow [facility]. She plans on reaching out to them for further evaluation. Agree that this is a reasonable next step in evaluation of this pain in her abdomen. Would also recommend follow up with GI or her PCP in 1-2 weeks as well. Patient aware of signs and symptoms of infection and reasons to return to the emergency department or seek urgent care. All questions answered at the end of the visit.  - CT Abdomen Pelvis w Contrast  - UA macro with reflex to Microscopic and Culture - Clinc Collect  - CBC with platelets and differential  - Comprehensive metabolic panel (BMP + Alb, Alk Phos, ALT, AST, Total. Bili, TP)  - Urine Microscopic  - Follow up with PCP in 1-2 weeks (or [facility])       BMI:   Estimated body mass index is 24.71 kg/m  as calculated from the following:    Height as of 11/19/20: 1.664 m (5' 5.5\").    Weight as of 6/21/21: 68.4 kg (150 lb 12.8 oz).       Return in about 3 days (around 7/21/2021) for Follow up with primary care physician.    Caridad Ospina MD  [facility]    Ken Espinosa is a 66 year old who presents for the following health issues:  Chief Complaint   Patient presents with     Abdominal Pain     Has Diverticulitis and IBS. x3 weeks, thought it was a pulled muscle. LLQ tight pain x2 days. Constipation x1 day, suppository at 1am       SARKIS Williamson is here today for abdominal pain for the last three " weeks. She has a history of constipation, celiac sprue, and diverticulitis. She now has left lower quadrant tight pain for the last two days. It felt like it was pinching while she walking and then today it was a little numb and it's very uncomfortable. She hasn't had any fevers and chills. This doesn't feel like the diverticulitis that she has had in the past. She is wondering if it's a spasm and it feels different. She has a little constipation yesterday and today, but no diarrhea. She has been able to go to the bathroom. She hasn't twisted, turned, and fell that started the pain. She has been working with a biofeedback therapist and she has some abdominal workouts that she's been doing. She is worried that it could be a hernia, especially with the way it behaved today. She has not had a spasm like this before. She has had appendix removed years ago.    Review of Systems   See HPI.      Objective    /73   Pulse 74   Temp 98.1  F (36.7  C) (Oral)   Resp 16   SpO2 97%   There is no height or weight on file to calculate BMI.  Physical Exam  Constitutional:       Appearance: Normal appearance.   HENT:      Head: Normocephalic.   Abdominal:      General: Abdomen is flat. Bowel sounds are normal. There is no distension.      Palpations: There is no mass.      Tenderness: There is abdominal tenderness in the left lower quadrant. There is no guarding or rebound. Negative signs include Sweet's sign and McBurney's sign.      Hernia: No hernia is present.   Neurological:      Mental Status: She is alert.          Results for orders placed or performed in visit on 07/18/21 (from the past 24 hour(s))   UA macro with reflex to Microscopic and Culture - Clinc Collect    Specimen: Urine, Clean Catch   Result Value Ref Range    Color Urine Yellow Colorless, Straw, Light Yellow, Yellow    Appearance Urine Clear Clear    Glucose Urine Negative Negative mg/dL    Bilirubin Urine Negative Negative    Ketones Urine Negative  Negative mg/dL    Specific Gravity Urine 1.015 1.005 - 1.030    Blood Urine Trace (A) Negative    pH Urine 8.5 (H) 5.0 - 8.0    Protein Albumin Urine Negative Negative mg/dL    Urobilinogen Urine 0.2 0.2, 1.0 E.U./dL    Nitrite Urine Negative Negative    Leukocyte Esterase Urine Negative Negative   Urine Microscopic   Result Value Ref Range    Bacteria Urine Few (A) None Seen /HPF    RBC Urine 0-2 0-2 /HPF /HPF    WBC Urine 0-5 0-5 /HPF /HPF    Squamous Epithelials Urine Few (A) None Seen /LPF    Narrative    Urine Culture not indicated   CBC with platelets and differential    Narrative    The following orders were created for panel order CBC with platelets and differential.  Procedure                               Abnormality         Status                     ---------                               -----------         ------                     CBC with platelets and d...[[id number]]                      Final result                 Please view results for these tests on the individual orders.   CBC with platelets and differential   Result Value Ref Range    WBC Count 6.2 4.0 - 11.0 10e3/uL    RBC Count 4.76 3.80 - 5.20 10e6/uL    Hemoglobin 13.9 11.7 - 15.7 g/dL    Hematocrit 42.7 35.0 - 47.0 %    MCV 90 78 - 100 fL    MCH 29.2 26.5 - 33.0 pg    MCHC 32.6 31.5 - 36.5 g/dL    RDW 13.9 10.0 - 15.0 %    Platelet Count 234 150 - 450 10e3/uL    % Neutrophils 71 %    % Lymphocytes 20 %    % Monocytes 8 %    % Eosinophils 2 %    % Basophils 0 %    % Immature Granulocytes 0 %    Absolute Neutrophils 4.4 1.6 - 8.3 10e3/uL    Absolute Lymphocytes 1.2 0.8 - 5.3 10e3/uL    Absolute Monocytes 0.5 0.0 - 1.3 10e3/uL    Absolute Eosinophils 0.1 0.0 - 0.7 10e3/uL    Absolute Basophils 0.0 0.0 - 0.2 10e3/uL    Absolute Immature Granulocytes 0.0 <=0.0 10e3/uL

## 2021-07-23 ENCOUNTER — OFFICE VISIT (OUTPATIENT)
Dept: FAMILY MEDICINE | Facility: CLINIC | Age: 66
End: 2021-07-23
Payer: MEDICARE

## 2021-07-23 DIAGNOSIS — Z20.822 ENCOUNTER FOR LABORATORY TESTING FOR COVID-19 VIRUS: Primary | ICD-10-CM

## 2021-07-23 PROCEDURE — U0003 INFECTIOUS AGENT DETECTION BY NUCLEIC ACID (DNA OR RNA); SEVERE ACUTE RESPIRATORY SYNDROME CORONAVIRUS 2 (SARS-COV-2) (CORONAVIRUS DISEASE [COVID-19]), AMPLIFIED PROBE TECHNIQUE, MAKING USE OF HIGH THROUGHPUT TECHNOLOGIES AS DESCRIBED BY CMS-2020-01-R: HCPCS | Performed by: PHYSICIAN ASSISTANT

## 2021-07-23 PROCEDURE — U0005 INFEC AGEN DETEC AMPLI PROBE: HCPCS | Performed by: PHYSICIAN ASSISTANT

## 2021-07-23 PROCEDURE — 99207 PR NO CHARGE LOS: CPT | Performed by: PHYSICIAN ASSISTANT

## 2021-07-23 NOTE — PROGRESS NOTES
Requesting covid test because her  is having symptoms.   She is not having any symptoms, feels well.   Covid vaccinated.       (Z20.622) Encounter for laboratory testing for COVID-19 virus  (primary encounter diagnosis)  Comment:  Plan: COVID PCR test ordered    No charge for this encounter. She is here with her  who is being evaluated for SOB.    Agnes Pruitt PA-C

## 2021-07-23 NOTE — PATIENT INSTRUCTIONS
Patient Education     Coronavirus Disease 2019 (COVID-19): Caring for Yourself or Others   If you or a household member have symptoms of COVID-19, follow the guidelines below. This will help you manage symptoms and keep the virus from spreading.  If you have symptoms of COVID-19    Stay home and contact your care team. They will tell you what to do. You may be told to stay home and away from others (self-isolate). You may also be told to stay at least 6 feet from others (social distancing).    Stay away from work, school, and public places.    Limit physical contact with others in your home. Limit visitors. No kissing.  Clean surfaces you touch with disinfectant.  If you need to cough or sneeze, do it into a tissue. Then throw the tissue into the trash. If you don't have tissues, cough or sneeze into the bend of your elbow.  Don t share food or personal items with people in your household. This includes items like eating and drinking utensils, towels, and bedding.  Wear a cloth face mask around other people. During a public health emergency, medical face masks may be reserved for healthcare workers. You may need to make a cloth face mask of your own. You can do this using a bandana, T-shirt, or other cloth. The CDC has instructions on how to make a face mask. Wear the mask so that it covers both your nose and mouth.  If you need to go to a hospital or clinic, call ahead to let them know. Expect the care team to wear masks, gowns, gloves, and eye protection. You may need to come to a different entrance or wait in a separate room.  Follow all instructions from your care team.    If you ve been diagnosed with COVID-19    Stay home and away from others, including other people in your home. (This is called self-isolation.) Don t leave home unless you need to get medical care. Don t go to work, school, or public places. Don t use buses, taxis, or other public transportation.    Follow all instructions from your care  team.    If you need to go to a hospital or clinic, call ahead to let them know. Expect the care team to wear masks, gowns, gloves, and eye protection. You may need to come to a different entrance or wait in a separate room.    Wear a face mask over your nose and mouth. This is to protect others from your germs. If you can t wear a mask, your caregivers should wear one. You may need to make your own mask using a bandana, T-shirt, or other cloth. See the CDC s instructions on how to make a face mask.    Have no contact with pets and other animals.    Don t share food or personal items with people in your household. This includes items like eating and drinking utensils, towels, and bedding.    If you need to cough or sneeze, do it into a tissue. Then throw the tissue into the trash. If you don't have tissues, cough or sneeze into the bend of your elbow.    Wash your hands often.    Self-care at home  The FDA has approved an antiviral medicine (called remdesivir) for people being treated in the hospital. This is for people 12 years and older who weigh more than about 88 pounds (40 kgs). In certain cases, it may also be used for people younger than 12 years or who weigh less than about 88 pounds (40 kgs)..  Currently, treatment is mostly aimed at helping your body while it fights the virus.    Getting extra rest.    Drink extra fluids 6 to 8 glasses of liquids each day), unless a doctor has told you not to. Ask your care team which fluids are best for you. Avoid fluids that contain caffeine or alcohol.    Taking over-the-counter (OTC) medicine to reduce pain and fever. Your care team will tell you which medicine to use.  If you ve been in the hospital for COVID-19, follow your care team s instructions. They will tell you when to stop self-isolation. They may also have you change positions to help your breathing (such as lying on your belly).  If a test showed that you have COVID-19, you may be asked to donate plasma  after you ve recovered. (This is called COVID-19 convalescent plasma donation.) The plasma may contain antibodies to help fight the virus in others. Experts don't know the safety of plasma or how well it works. Research continues, and the FDA has approved it for emergency use in certain people with serious or life-threatening COVID-19.  Caring for a sick person     Follow all instructions from the care team.    Wash your hands often.    Wear protective clothing as advised.    Make sure the sick person wears a mask. If they can't wear a mask, don t stay in the same room with them. If you must be in the same room, wear a face mask. Make sure the mask covers both the nose and mouth.    Keep track of the sick person s symptoms.    Clean surfaces often with disinfectant. This includes phones, kitchen counters, fridge door handles, bathroom surfaces, and others.    Don t let anyone share household items with the sick person. This includes eating and drinking tools, towels, sheets, or blankets.    Clean fabrics and laundry well.    Keep other people and pets away from the sick person.    When you can stop self-isolation  When you are sick with COVID-19, you should stay away from other people. This is called self-isolation. The rules for ending self-isolation depend on your health in general.  If you are normally healthy:  You can stop self-isolation when all 3 of these are true:    You ve had no fever--and no medicine that reduces fever--for at least 24 hours. And     Your symptoms are better, such as cough or trouble breathing. And     At least 10 days have passed since your symptoms first started.  Talk with your care team before you leave home. They may tell you it s okay to leave, or they may give you different advice. You do not need to be re-tested.  If you have a weak immune system, or you ve had severe COVID-19:  Follow your care team s instructions. You may be asked to self-isolate for 10 days to 20 days after  your symptoms first started. Your care team may want to re-test you for COVID-19.  Note: If you re being treated for cancer, have an immune disorder (such as HIV), or have had a transplant (organ or bone marrow), you may have a weak immune system.  If you've already had COVID-19 once:  If you had the virus over 3 months ago, and you ve been exposed again, treat it like you've never had COVID-19. Stay home, limit your contact with others, call your care team, and watch for symptoms.  If it s been less than 3 months since you had the virus, you re symptom-free, and you ve been exposed again: You don t need to self-isolate. You don t need to be re-tested, unless you have new symptoms of COVID-19 that can t be linked to another illness. But if you develop new symptoms, stay home. Contact your care team if you have questions.  When you return to public settings  When you are well enough to go outside your home, follow the CDC s guidance on cloth face masks.    Anyone over age 2 should wear a face mask in public, especially when it's hard to socially distance. This includes public transit, public protests and marches, and crowded stores, bars, and restaurants.    Face masks are most likely to reduce the spread of COVID-19 when they are widely used by people who are out in the public.  Certain people should not wear a face covering. These include:    Children under 2 years old    Anyone with a health, developmental, or mental health condition that can be made worse by wearing a mask    Anyone who is unconscious or unable to remove the face covering without help. See the CDC's guidance on who should not wear a face mask.  When to call your care team  Call your care team right away if a sick person has any of these:    Trouble breathing    Pain or pressure in chest  If a sick person has any of these, call 911:    Trouble breathing that gets worse    Pain or pressure in chest that gets worse    Blue tint to lips or  face    Fast or irregular heartbeat    Confusion or trouble waking    Fainting or loss of consciousness    Coughing up blood  Going home from the hospital   If you have COVID-19 and were recently in the hospital:    Follow the instructions above for self-care and isolation.    Follow the hospital care team s instructions.    Ask questions if anything is unclear to you. Write down answers so you remember them.  Date last modified: 1/15/2021  Melanie last reviewed this educational content on 4/1/2020  This information has been modified by your health care provider with permission from the publisher.    2489-4072 [employer]. [address] [place] [id number]. All rights reserved. This information is not intended as a substitute for professional medical care. Always follow your healthcare professional's instructions.

## 2021-07-24 LAB — SARS-COV-2 RNA RESP QL NAA+PROBE: NEGATIVE

## 2021-08-10 ENCOUNTER — THERAPY VISIT (OUTPATIENT)
Dept: PHYSICAL THERAPY | Facility: CLINIC | Age: 66
End: 2021-08-10
Payer: MEDICARE

## 2021-08-10 DIAGNOSIS — M62.89 PELVIC FLOOR DYSFUNCTION IN FEMALE: ICD-10-CM

## 2021-08-10 DIAGNOSIS — K56.41 OBSTRUCTIVE DEFECATION (H): ICD-10-CM

## 2021-08-10 PROCEDURE — 97110 THERAPEUTIC EXERCISES: CPT | Mod: GP | Performed by: PHYSICAL THERAPIST

## 2021-08-10 PROCEDURE — 97140 MANUAL THERAPY 1/> REGIONS: CPT | Mod: GP | Performed by: PHYSICAL THERAPIST

## 2021-08-10 PROCEDURE — 97112 NEUROMUSCULAR REEDUCATION: CPT | Mod: GP | Performed by: PHYSICAL THERAPIST

## 2021-08-10 NOTE — PROGRESS NOTES
"Subjective:  HPI  Physical Exam                    Objective:  System    Physical Exam    General     ROS    Assessment/Plan:    DISCHARGE REPORT    Progress reporting period is from 6-9-21 to 8-10-21, 6 visits.       SUBJECTIVE  Subjective changes noted by patient:  .  Subjective: Francisca did have an abdominal CT as she was having so much L LQ pain/pinching, like a vice , it looked good except for the diverticulosis. She did connect with [facility] as well they feel it is more of a muscular issue. Francisca states L mid to lower quarter pain comes and goes. She splits her walks into 20 min, 2x/day which helps. She was given FODMAP diet to try. She will see dietician.  3-4 BM's/day and type 2-7(rare 7, ave is 4-5) on [place] stool chart-much improved.   Rarely has felt like she has \"rocks in her bum\" anymore. Does feel like she has improved overall. No urinary issues Feels she is ready to finish up with PT today and looking forward to meeting with dietician next month     Current Pain level: 2/10 (L lower abdominal).      Initial Pain level: 3/10 (daily L Lower abdominal pain).   Changes in function:  Yes (See Goal flowsheet attached for changes in current functional level)  Adverse reaction to treatment or activity: None    OBJECTIVE  Changes noted in objective findings:  Yes,   Objective: have scaled back core ex program as Francisca felt it may be exacerbating the abdominal pain, she has been provided with a progressive low level  isometric program for TrA, hip abd  and active standing hip extension. She did have pain to palpation L mid to LQ today and gentle abdominal mobilization was felt as more of a pull today, she will trial some of this at home as well. Pelvic floor-tonic activation is fair with 8\" endurance, phasic activation is good to fair, she will continue to work on her PFME.  Francisca has been instructed in seated defecation mechanics/proper pushing and correct breathing patterns.     ASSESSMENT/PLAN  Updated " problem list and treatment plan: Diagnosis 1:  Obstructed defecation/pelvic floor dysfunction-continue with HEP    STG/LTGs have been met or progress has been made towards goals:  Yes (See Goal flow sheet completed today.)  Assessment of Progress: The patient's condition is improving.  Self Management Plans:  Patient has been instructed in a home treatment program.  Patient  has been instructed in self management of symptoms.  I have re-evaluated this patient and find that the nature, scope, duration and intensity of the therapy is appropriate for the medical condition of the patient.  Francisca continues to require the following intervention to meet STG and LTG's:  PT intervention is no longer required to meet STG/LTG.    Recommendations:  This patient is ready to be discharged from therapy and continue their home treatment program.    Please refer to the daily flowsheet for treatment today, total treatment time and time spent performing 1:1 timed codes.

## 2021-08-11 ENCOUNTER — ANCILLARY PROCEDURE (OUTPATIENT)
Dept: MAMMOGRAPHY | Facility: CLINIC | Age: 66
End: 2021-08-11
Attending: FAMILY MEDICINE
Payer: MEDICARE

## 2021-08-11 DIAGNOSIS — Z12.31 VISIT FOR SCREENING MAMMOGRAM: ICD-10-CM

## 2021-08-11 PROCEDURE — 77063 BREAST TOMOSYNTHESIS BI: CPT

## 2021-08-27 ENCOUNTER — OFFICE VISIT (OUTPATIENT)
Dept: FAMILY MEDICINE | Facility: CLINIC | Age: 66
End: 2021-08-27
Payer: MEDICARE

## 2021-08-27 ENCOUNTER — HOSPITAL ENCOUNTER (OUTPATIENT)
Dept: ULTRASOUND IMAGING | Facility: HOSPITAL | Age: 66
Discharge: HOME | End: 2021-08-27
Attending: FAMILY MEDICINE | Admitting: FAMILY MEDICINE
Payer: MEDICARE

## 2021-08-27 VITALS
BODY MASS INDEX: 24.15 KG/M2 | SYSTOLIC BLOOD PRESSURE: 120 MMHG | HEART RATE: 80 BPM | WEIGHT: 150.25 LBS | OXYGEN SATURATION: 94 % | TEMPERATURE: 97.3 F | HEIGHT: 66 IN | DIASTOLIC BLOOD PRESSURE: 67 MMHG

## 2021-08-27 DIAGNOSIS — M79.662 PAIN OF LEFT CALF: ICD-10-CM

## 2021-08-27 DIAGNOSIS — M79.662 PAIN OF LEFT CALF: Primary | ICD-10-CM

## 2021-08-27 PROCEDURE — 99213 OFFICE O/P EST LOW 20 MIN: CPT | Performed by: FAMILY MEDICINE

## 2021-08-27 PROCEDURE — 93971 EXTREMITY STUDY: CPT | Mod: LT

## 2021-08-27 RX ORDER — HYOSCYAMINE SULFATE 0.125 MG
TABLET ORAL
COMMUNITY
Start: 2021-07-29 | End: 2024-08-30

## 2021-08-27 ASSESSMENT — MIFFLIN-ST. JEOR: SCORE: 1220.34

## 2021-08-27 NOTE — PROGRESS NOTES
"    Assessment & Plan     1. Pain of left calf  - US Lower Extremity Venous Duplex Left; Future     Acute onset pain in calf unilaterally without trauma or change in activity.  No focal redness or warmth.  Need to rule out DVT. US order placed.     If negative for DVT, recommend rest, NSAIDS, and consideration of compression stockings.  Follow up if not improving.    Return in about 2 months (around 10/26/2021) for medicare wellness visit.    Gina Zabala [facility]    Subjective   Francisca is a 66 year old who presents for the following health issues     Chief Complaint   Patient presents with     Leg pain     Left leg pain and swelling, no mass, not warm to the touch.        HPI     Onset of leg pain and swelling, started 2 days ago. Unilateral symptoms, predominately on the left. Also thinks there is skin changes. Not getting better, painful. Pain located in the calf.    Not red, not warm to the touch.   No history of blood clots.   No shortness of breath.  Pain feels deeper and on the skin, sensitive.  Pain worse with dorsiflexion as well.    No changes in activities.     Review of Systems   See HPI above.       Objective    /67   Pulse 80   Temp 97.3  F (36.3  C) (Oral)   Ht 1.664 m (5' 5.5\")   Wt 68.2 kg (150 lb 4 oz)   SpO2 94%   BMI 24.62 kg/m    Body mass index is 24.62 kg/m .  Physical Exam   GENERAL: Francisca is a well appearing female, healthy weight.  HEART: Regular rate and rhythm, no murmurs.  LUNGS: Clear to auscultation bilaterally, unlabored.   MSK: Focal pain with palpation of left calf. No erythema, swelling, or warmth appreciated. Mild varicose veins present bilaterally.    "

## 2021-08-27 NOTE — RESULT ENCOUNTER NOTE
Patient updated by [employer] message.   Please also call with update, time sensitive imaging.  ----------------------------------------------------  Elmer Espinosa,  Your ultrasound is negative for a blood clot.   I would treat symptomatically with rest, stretching, ibuprofen as needed, and consideration for compression stockings.  Reach out with questions or concerns, or if not improving.  Gina Zabala, DO

## 2021-09-04 ENCOUNTER — OFFICE VISIT (OUTPATIENT)
Dept: FAMILY MEDICINE | Facility: CLINIC | Age: 66
End: 2021-09-04
Payer: MEDICARE

## 2021-09-04 VITALS
HEART RATE: 70 BPM | SYSTOLIC BLOOD PRESSURE: 130 MMHG | DIASTOLIC BLOOD PRESSURE: 75 MMHG | OXYGEN SATURATION: 98 % | TEMPERATURE: 97.8 F

## 2021-09-04 DIAGNOSIS — T63.441A BEE STING REACTION, ACCIDENTAL OR UNINTENTIONAL, INITIAL ENCOUNTER: ICD-10-CM

## 2021-09-04 DIAGNOSIS — R52 ACHES: Primary | ICD-10-CM

## 2021-09-04 PROCEDURE — U0003 INFECTIOUS AGENT DETECTION BY NUCLEIC ACID (DNA OR RNA); SEVERE ACUTE RESPIRATORY SYNDROME CORONAVIRUS 2 (SARS-COV-2) (CORONAVIRUS DISEASE [COVID-19]), AMPLIFIED PROBE TECHNIQUE, MAKING USE OF HIGH THROUGHPUT TECHNOLOGIES AS DESCRIBED BY CMS-2020-01-R: HCPCS | Performed by: FAMILY MEDICINE

## 2021-09-04 PROCEDURE — U0005 INFEC AGEN DETEC AMPLI PROBE: HCPCS | Performed by: FAMILY MEDICINE

## 2021-09-04 PROCEDURE — 99213 OFFICE O/P EST LOW 20 MIN: CPT | Performed by: FAMILY MEDICINE

## 2021-09-04 NOTE — PROGRESS NOTES
Francisca Williamson is a 66 year old female who comes in today for bee sting about an hour ago at home outside    Got rash right away, came to [facility]    A little tight in chest but has costochondritis chronically    Reacted to keflex in past    Later had challenge test, normal    Otherwise not history of allergic reactions / allergies    Achy and head congestion the last few days also    Grandson has hand foot mouth disease    Has been vaccinated    Full physical not done     Mentation and affect are fine    No tremor of speech or extremity    Heart and lungs fine    No resp distress    Arm exam basically normal now    By history patient had a rash present in inside of both arms    ASSESSMENT / PLAN:  (R52) Aches  (primary encounter diagnosis)  Comment: patient wanted covid test.  Swab done.  She is fully vaccinated.   Plan: Symptomatic COVID-19 Virus (Coronavirus) by PCR        Nose             (T63.441A) Bee sting reaction, accidental or unintentional, initial encounter  Comment: brief reaction, now resolved.    Plan: follow up prn.  Use over the counter loratadine or fexofenadine for the next 1-2 weeks. could still use prn benadryl      Be seen promptly if symptoms acutely worsen       I reviewed the patient's medications, allergies, medical history, family history, and social history.    Carlyle Gaitan MD

## 2021-09-04 NOTE — PATIENT INSTRUCTIONS
Take over the counter loratadine or fexofenadine ( claritin or allegra ) daily for the next week or two    Could still use benadryl as needed

## 2021-09-05 LAB — SARS-COV-2 RNA RESP QL NAA+PROBE: NEGATIVE

## 2021-09-20 ENCOUNTER — HOSPITAL ENCOUNTER (OUTPATIENT)
Dept: NUTRITION | Facility: HOSPITAL | Age: 66
Discharge: HOME | End: 2021-09-20
Admitting: FAMILY MEDICINE
Payer: MEDICARE

## 2021-09-20 DIAGNOSIS — M62.89 PELVIC FLOOR DYSFUNCTION IN FEMALE: ICD-10-CM

## 2021-09-20 DIAGNOSIS — K59.00 OBSTIPATION: ICD-10-CM

## 2021-09-20 DIAGNOSIS — K90.0 CELIAC DISEASE: Primary | ICD-10-CM

## 2021-09-20 PROCEDURE — 97802 MEDICAL NUTRITION INDIV IN: CPT | Mod: TEL

## 2021-09-21 NOTE — OP NOTE
"[facility] NUTRITION SERVICES  OP MNT Nutrition Therapy    Visit Type: Initial Assessment    Francisca Williamson, 66 year old referred by PCP for MNT related to Celiac disease, constipation  Patient interested in FOD-Map diet guidelines     The patient has been notified of following: .  RD informed patient that Medicare does not cover OP MNT diet education for this Nutrition diagnosis: Celiac disease, constipation   RD encouraged patient to reach out to her other insurance provider to inquire about coverage   Patient gave verbal consent for this Telephone visit? \"Yes\" Patient requested limit consult to 15 minutes  RD sent ABN for signature & return    Phone call duration: 15 minutes     Nutrition Assessment:  Anthropometrics  Height:   Ht Readings from Last 1 Encounters:   08/27/21 1.664 m (5' 5.5\")         BMI: 24.6   Weight Status:    Normal BMI   Weight:   Wt Readings from Last 1 Encounters:   08/27/21 68.2 kg (150 lb 4 oz)       UBW:   Wt Readings from Last 8 Encounters:   08/27/21 68.2 kg (150 lb 4 oz)   06/21/21 68.4 kg (150 lb 12.8 oz)   12/25/20 71.2 kg (157 lb)   11/19/20 73.5 kg (162 lb)   10/09/20 71.2 kg (157 lb)   10/07/20 69.4 kg (153 lb)   10/06/20 71.2 kg (157 lb)   06/15/20 72.1 kg (159 lb)         IBW: 128 lbs (58 kg)  IBW %: 118%        Weight History: Weight stable    Goal Weight: Maintenance    Nutrition History  Patient has been Gluten free for over 15 years.  Francisca more recently gave up Dairy / Lactose which has helped reduce GI discomfort     PMH:   GERD, Osteoporosis, Diverticular disease, Obstipation, Pelvic floor dysfunction    Labs:   B12 WNL  BG  WNL  Vit D level not listed    Meds: reviewed    Supplements:   Poor tolerance to Calcium supplement  RD suggested try Viactiv Chew     Nutritional Details:   -Food sensitivities: Gluten / Dairy / Lactose  -GI concerns: Intermittent Nausea, Constipation, Bloating and Gas  -Appetite: Good    Food Recall:   Gluten free / dairy " "free    Malnutrition:  Patient does not meet criteria necessary for diagnosing malnutrition    Nutrition Diagnosis:  Food and nutrition-related knowledge deficit related to FOD-Map diet as evidenced by patient has questions and is requesting clarification of FOD-Map diet guidelines      Nutrition Intervention:  Nutrition Prescription Summary: Gluten free, dairy free diet & awareness of FOD Map potential food irritants    Nutrition Education (Content):  -Discussed:  Plan is to eliminate high FOD-Map foods in an effort to alleviate indigestion.  RD provided list of High / Low FOD Map foods.   The diet listing she has been referring to is not as concise, as it includes moderate FOD-MAP foods to \"limit\" but does not define what 'Limited' intake is exactly.    Provided the following handouts:     (GF) 100 calorie snack list    FOD-Map diet info    Calcium food listing    Ways to add fruits & vegetables    Info on Vitamin D      Nutrition Prescription: Macronutrient and Micronutrient details   Energy: 1450 to 1740 kcals/day (25-30 kcal/kg)    Justification:  (maintenance) Protein: 70 to 85 g Protein /day (1.2-1.5 g Pro/kg)    Justification:  (maintenance)   Fluid: 1750 mL/day   (30 mL/kg)     Justification:  (maintenance)   Fat: Allow Avocado, nuts,    if tolerated as they contain \"good fats\"          Carbohydrate:   3-4 carb choices per meal        Fiber: Women (>50 years): 21 grams per day     Micronutrient:  Calcium, Vit D           Vitamin and Mineral Supplements:   Chewable Calcium if tolerated  RD recommends Multivitamin plus 1-2 tabs of 2,000 international unit(s) with goal to achieve a Vit D blood level of 50 + ng/dL     Patient Engagement:   Teaching Method(s) used: Booklet / Handout  Explanation    Nutrition Education (Application):  a)  Discussed current eating plan / recommended strategies to alleviate constipation  Continue Probiotic~ [ 30 billion CFU ]  When constipated try one Magnesium oxide 400 mg  tab " "and 4 oz Prune juice to inspire Bowel movement  Squatty Potty ~ toilet stool to ease intra-abdominal pressure when bearing down    b)  Patient verbalizes understanding    Anticipate good compliance   Stage of Change:  action    Nutrition Goals:  1) Trial elimination of high FOD-Map foods Check if GI symptoms improve  2) Continue current Probiotic, squatty potty, Pelvic floor exercises and GF & Dairy free diet  3) Keep informal \"red flag diary\" by reflecting on foods recently consumed (4-6 hours prior to) GI upset  4) Try (400 mg) Magnesium & Prune juice as nutritional method to inspire a Bowel movement    Nutrition Follow Up / Monitoring:  Progress towards normal daily BM;  reduction of GI complaints (gas/bloating, constipation)     Nutrition Recommendations:  Patient to follow-up with RD per need or request  Patient has RD contact information to call / e-mail if needed.    Start time: 11:30  End time:  11:46    Total Time Duration: 15 minutes         "

## 2021-09-28 ENCOUNTER — NURSE TRIAGE (OUTPATIENT)
Dept: NURSING | Facility: CLINIC | Age: 66
End: 2021-09-28

## 2021-09-28 ENCOUNTER — OFFICE VISIT (OUTPATIENT)
Dept: FAMILY MEDICINE | Facility: CLINIC | Age: 66
End: 2021-09-28
Payer: MEDICARE

## 2021-09-28 VITALS
SYSTOLIC BLOOD PRESSURE: 119 MMHG | DIASTOLIC BLOOD PRESSURE: 79 MMHG | OXYGEN SATURATION: 97 % | TEMPERATURE: 98.1 F | HEART RATE: 63 BPM

## 2021-09-28 DIAGNOSIS — R07.89 CHEST TIGHTNESS: Primary | ICD-10-CM

## 2021-09-28 LAB
ATRIAL RATE - MUSE: 66 BPM
DIASTOLIC BLOOD PRESSURE - MUSE: NORMAL MMHG
INTERPRETATION ECG - MUSE: NORMAL
P AXIS - MUSE: 47 DEGREES
PR INTERVAL - MUSE: 110 MS
QRS DURATION - MUSE: 88 MS
QT - MUSE: 366 MS
QTC - MUSE: 383 MS
R AXIS - MUSE: 48 DEGREES
SYSTOLIC BLOOD PRESSURE - MUSE: NORMAL MMHG
T AXIS - MUSE: 52 DEGREES
VENTRICULAR RATE- MUSE: 66 BPM

## 2021-09-28 PROCEDURE — 93010 ELECTROCARDIOGRAM REPORT: CPT | Mod: OFF | Performed by: GENERAL ACUTE CARE HOSPITAL

## 2021-09-28 PROCEDURE — U0003 INFECTIOUS AGENT DETECTION BY NUCLEIC ACID (DNA OR RNA); SEVERE ACUTE RESPIRATORY SYNDROME CORONAVIRUS 2 (SARS-COV-2) (CORONAVIRUS DISEASE [COVID-19]), AMPLIFIED PROBE TECHNIQUE, MAKING USE OF HIGH THROUGHPUT TECHNOLOGIES AS DESCRIBED BY CMS-2020-01-R: HCPCS | Mod: 90 | Performed by: PHYSICIAN ASSISTANT

## 2021-09-28 PROCEDURE — 99214 OFFICE O/P EST MOD 30 MIN: CPT | Mod: 25 | Performed by: PHYSICIAN ASSISTANT

## 2021-09-28 PROCEDURE — 99000 SPECIMEN HANDLING OFFICE-LAB: CPT | Performed by: PHYSICIAN ASSISTANT

## 2021-09-28 PROCEDURE — 93005 ELECTROCARDIOGRAM TRACING: CPT | Performed by: PHYSICIAN ASSISTANT

## 2021-09-28 RX ORDER — PREDNISONE 20 MG/1
TABLET ORAL
Qty: 24 TABLET | Refills: 0 | Status: CANCELLED | OUTPATIENT
Start: 2021-09-28 | End: 2021-10-09

## 2021-09-28 ASSESSMENT — ENCOUNTER SYMPTOMS
VOMITING: 0
SINUS PRESSURE: 0
SINUS PAIN: 1
DIARRHEA: 0
LIGHT-HEADEDNESS: 0
HEADACHES: 0
APPETITE CHANGE: 0
DIZZINESS: 0
FEVER: 0
SORE THROAT: 0
COUGH: 0
NAUSEA: 0
CHEST TIGHTNESS: 1

## 2021-09-28 NOTE — PROGRESS NOTES
Patient presents with:  Chest Pain: On/off x8 days, worsened on Sunday.  Negative COVID test on 9/23. hx of costochronditis.   Generalized Body Aches  Nasal Congestion      Clinical Decision Making: Patient is experiencing chest tightness. I have low suspicion for cardiac involvement given its association with viral syndrome symptoms. Patient is already had a negative Covid test, but would like another. Order placed today and result is pending. Suspect chest tightness need to be product of GERD symptoms or postnasal drainage. Recommend Flonase and Natty pot. Recommend introducing partially fermented foods to help continue to combat GERD symptoms. Patient is agreeable to this plan. EKG appears within normal limits. Patient is vitally stable with clear lung sounds. Patient is reassured by this.      ICD-10-CM    1. Chest tightness  R07.89 EKG 12-lead, tracing only     Symptomatic COVID-19 Virus (Coronavirus) by PCR Nose       Patient Instructions   1.  Your Covid test results will be available in about 24 hours via [employer].  2.  Recommend Flonase nasal spray to help shrink down inflamed sinus tissue.  A Natty pot or saline nasal wash can also be helpful to remove mucus and help with postnasal drainage.  3.  Your EKG appears normal.  You are vitally stable.  Your lungs are clear when I am listening to them.  I do not have suspicion for blood clot in the lungs, cardiac involvement, or pneumonia.  4.  Follow-up if you have failed to have any improvement in your symptoms over the course of the next 4 days.  5.  If you are experiencing worsening indigestion recommend adding partially fermented food in addition to your Amalia remedies.  6.  Seek emergency medical attention if you develop significant chest pain, shortness of breath, lightheadedness/dizziness/or fainting, or leg swelling.      HPI:  Francisca Williamson is a 66 year old female who presents today complaining of nasal congestion, body aches going on for the past  "8 days.  Patient's been also experiencing some chest tightness going on and off for 2 days.  She had a Covid test on 9/23 which was negative. She is vaccinated against COVID. No known exposures to COVID-19. She denies any appetite changes, fevers, cough, nausea, vomiting, diarrhea. She has had some indigestion and heartburn. She does not like to take medications for heartburn, she will her dietitian recommended taking clementines. In the past this has been very effective for her in controlling her GERD symptoms. She denies any history of environmental allergies. She has not felt dizzy, lightheaded, or had any headaches. She has experienced some intermittent sinus discomfort and postnasal drainage.    History obtained from the patient.    Problem List:  2021-06: Obstructive defecation  2021-06: Pelvic floor dysfunction in female  2005-01: Shingles      Past Medical History:   Diagnosis Date     B12 deficiency      Celiac disease      Diverticulitis      Diverticulosis      GERD (gastroesophageal reflux disease)     states main problem is heartburn     High cholesterol      Osteoporosis      Shingles 1/1/2005       Social History     Tobacco Use     Smoking status: Never Smoker     Smokeless tobacco: Never Used   Substance Use Topics     Alcohol use: Yes     Alcohol/week: 7.0 standard drinks       Review of Systems   Constitutional: Negative for appetite change and fever.   HENT: Positive for congestion, ear pain (mild, bilateral, plugged), postnasal drip and sinus pain (\"discomfort\"). Negative for sinus pressure and sore throat.    Respiratory: Positive for chest tightness (worse with exersion). Negative for cough.    Gastrointestinal: Negative for diarrhea, nausea and vomiting.   Skin: Negative for rash.   Allergic/Immunologic: Negative for environmental allergies.   Neurological: Negative for dizziness, light-headedness and headaches.       Vitals:    09/28/21 1452   BP: 119/79   BP Location: Left arm   Patient " Position: Sitting   Cuff Size: Adult Regular   Pulse: 63   Temp: 98.1  F (36.7  C)   TempSrc: Oral   SpO2: 97%       Physical Exam  Vitals and nursing note reviewed.   Constitutional:       General: She is not in acute distress.     Appearance: She is not toxic-appearing or diaphoretic.   HENT:      Head: Normocephalic and atraumatic.      Right Ear: Tympanic membrane, ear canal and external ear normal.      Left Ear: Tympanic membrane, ear canal and external ear normal.   Eyes:      Conjunctiva/sclera: Conjunctivae normal.   Cardiovascular:      Rate and Rhythm: Normal rate and regular rhythm.      Heart sounds: No murmur heard.     Pulmonary:      Effort: Pulmonary effort is normal. No respiratory distress.      Breath sounds: No stridor. No wheezing, rhonchi or rales.   Lymphadenopathy:      Cervical: No cervical adenopathy.   Neurological:      Mental Status: She is alert.   Psychiatric:         Mood and Affect: Mood normal.         Behavior: Behavior normal.         Thought Content: Thought content normal.         Judgment: Judgment normal.         Labs:  Results for orders placed or performed in visit on 09/28/21   EKG 12-lead, tracing only     Status: None   Result Value Ref Range    Systolic Blood Pressure  mmHg    Diastolic Blood Pressure  mmHg    Ventricular Rate 66 BPM    Atrial Rate 66 BPM    CA Interval 110 ms    QRS Duration 88 ms     ms    QTc 383 ms    P Axis 47 degrees    R AXIS 48 degrees    T Axis 52 degrees    Interpretation ECG       Sinus rhythm with short CA  Otherwise normal ECG  No previous ECGs available         At the end of the encounter, I discussed results, diagnosis, medications. Discussed red flags for immediate return to clinic/ER, as well as indications for follow up if no improvement. Patient understood and agreed to plan. Patient was stable for discharge.

## 2021-09-28 NOTE — TELEPHONE ENCOUNTER
Sinus congestion past 10 days/no fever/ has drainage down back of her throat/no fever/sent to scheduling for an appointment with in 1-2 days as per guidelines  Ashok Huitron RN -[phone]    Additional Information    Negative: Sounds like a life-threatening emergency to the triager    Negative: Difficulty breathing, and not from stuffy nose (e.g., not relieved by cleaning out the nose)    Negative: SEVERE headache and has fever    Negative: Patient sounds very sick or weak to the triager    Negative: SEVERE sinus pain    Negative: Severe headache    Negative: Redness or swelling on the cheek, forehead, or around the eye    Negative: Fever > 103 F (39.4 C)    Negative: Fever > 101 F (38.3 C) and over 60 years of age    Negative: Fever > 100.0 F (37.8 C) and has diabetes mellitus or a weak immune system (e.g., HIV positive, cancer chemotherapy, organ transplant, splenectomy, chronic steroids)    Negative: Fever > 100.0 F (37.8 C) and bedridden (e.g., nursing home patient, stroke, chronic illness, recovering from surgery)    Negative: Fever present > 3 days (72 hours)    Negative: Fever returns after gone for over 24 hours and symptoms worse or not improved    Negative: Sinus pain (not just congestion) and fever    Negative: Earache    Sinus congestion (pressure, fullness) present > 10 days    Protocols used: SINUS PAIN AND CONGESTION-A-[place]

## 2021-09-28 NOTE — PATIENT INSTRUCTIONS
1.  Your Covid test results will be available in about 24 hours via [employer].  2.  Recommend Flonase nasal spray to help shrink down inflamed sinus tissue.  A [place] pot or saline nasal wash can also be helpful to remove mucus and help with postnasal drainage.  3.  Your EKG appears normal.  You are vitally stable.  Your lungs are clear when I am listening to them.  I do not have suspicion for blood clot in the lungs, cardiac involvement, or pneumonia.  4.  Follow-up if you have failed to have any improvement in your symptoms over the course of the next 4 days.  5.  If you are experiencing worsening indigestion recommend adding partially fermented food in addition to your Amalia remedies.  6.  Seek emergency medical attention if you develop significant chest pain, shortness of breath, lightheadedness/dizziness/or fainting, or leg swelling.

## 2021-09-30 ENCOUNTER — OFFICE VISIT (OUTPATIENT)
Dept: FAMILY MEDICINE | Facility: CLINIC | Age: 66
End: 2021-09-30
Payer: MEDICARE

## 2021-09-30 VITALS
TEMPERATURE: 97.9 F | OXYGEN SATURATION: 96 % | HEART RATE: 72 BPM | SYSTOLIC BLOOD PRESSURE: 126 MMHG | DIASTOLIC BLOOD PRESSURE: 75 MMHG

## 2021-09-30 DIAGNOSIS — M79.652 PAIN OF LEFT THIGH: Primary | ICD-10-CM

## 2021-09-30 PROCEDURE — 99213 OFFICE O/P EST LOW 20 MIN: CPT | Performed by: NURSE PRACTITIONER

## 2021-09-30 NOTE — PROGRESS NOTES
Assessment & Plan     Pain of left thigh         Patient with isolated left inner thigh and groin area tenderness, reproducible with internal and external rotation of the hip.  Reproducible with palpation to affected area.  No redness.  No rash.  No swelling.  No Calf pain.    Most likely, this represents a muscle strain.  Explained that this could also be related to hip pathology such as arthritis and offered hip x-ray.     Would be very unlikely that above exam isolated location of pain in left inner thigh and groin would be associated with a blood clot in the leg.  Patient will try over-the-counter pain medication, ice packs, rest.  Come back if worsening.    Patient in agreement with plan.            No follow-ups on file.    Linda Pritchett [facility] HUGO Espinosa is a 66 year old female who presents to clinic today for the following health issues:  Chief Complaint   Patient presents with     Musculoskeletal Problem     left upper thigh discomfort x 1 day ago; Patient was here 2 days ago with chest discomfort and now has developed SOB and leg discomfort.      HPI    Patient was here 2 days ago and seen with chest tightness associated with URI symptoms and congestion with postnasal drainage.  At that time, EKG was normal, and she had normal vital signs and clear lung sounds.  She had had a negative Covid test previously and had a second Covid test associated with this visit which has not returned yet.    Chest tightness persists.  Has not taken acid reflux medication.    No change in activity recently.  Noted left inner, upper thigh pain associated with walking.  A little better today compared to yesterday.  No specific injury.  Has not had similar before.  Has no known arthritis in the hip.    Patient mainly concerned about blood clot.     Pain is 4-5/10.  Worse with change positions.  Is about 4 out of 10 with sitting still.    No calf pain.  No leg  swelling.      Review of Systems  See HPI       Objective    /75 (BP Location: Right arm, Patient Position: Chair, Cuff Size: Adult Regular)   Pulse 72   Temp 97.9  F (36.6  C) (Oral)   SpO2 96%   Physical Exam  Constitutional:       Appearance: Normal appearance.   HENT:      Nose: No congestion or rhinorrhea.   Cardiovascular:      Pulses: Normal pulses.   Pulmonary:      Effort: Pulmonary effort is normal.      Breath sounds: Normal breath sounds.   Musculoskeletal:         General: Tenderness present. No swelling.        Legs:       Comments: Area of tenderness.  Reproduced with external and internal rotation of the left hip.   Neurological:      Mental Status: She is alert.   Psychiatric:         Mood and Affect: Mood normal.         Behavior: Behavior normal.         Thought Content: Thought content normal.         Judgment: Judgment normal.

## 2021-10-01 LAB — SARS-COV-2 RNA RESP QL NAA+PROBE: NOT DETECTED

## 2021-10-04 ENCOUNTER — ANCILLARY PROCEDURE (OUTPATIENT)
Dept: GENERAL RADIOLOGY | Facility: CLINIC | Age: 66
End: 2021-10-04
Attending: FAMILY MEDICINE
Payer: MEDICARE

## 2021-10-04 ENCOUNTER — OFFICE VISIT (OUTPATIENT)
Dept: FAMILY MEDICINE | Facility: CLINIC | Age: 66
End: 2021-10-04
Payer: MEDICARE

## 2021-10-04 VITALS
WEIGHT: 151 LBS | SYSTOLIC BLOOD PRESSURE: 114 MMHG | TEMPERATURE: 97.7 F | DIASTOLIC BLOOD PRESSURE: 51 MMHG | OXYGEN SATURATION: 99 % | RESPIRATION RATE: 16 BRPM | HEART RATE: 89 BPM | BODY MASS INDEX: 24.75 KG/M2

## 2021-10-04 DIAGNOSIS — R06.09 DYSPNEA ON EXERTION: ICD-10-CM

## 2021-10-04 DIAGNOSIS — R07.89 ATYPICAL CHEST PAIN: ICD-10-CM

## 2021-10-04 DIAGNOSIS — Z23 NEED FOR PROPHYLACTIC VACCINATION AND INOCULATION AGAINST INFLUENZA: ICD-10-CM

## 2021-10-04 LAB
ALBUMIN SERPL-MCNC: 4.3 G/DL (ref 3.5–5)
ALP SERPL-CCNC: 64 U/L (ref 45–120)
ALT SERPL W P-5'-P-CCNC: 14 U/L (ref 0–45)
ANION GAP SERPL CALCULATED.3IONS-SCNC: 9 MMOL/L (ref 5–18)
AST SERPL W P-5'-P-CCNC: 20 U/L (ref 0–40)
ATRIAL RATE - MUSE: 65 BPM
BILIRUB SERPL-MCNC: 0.3 MG/DL (ref 0–1)
BUN SERPL-MCNC: 9 MG/DL (ref 8–22)
CALCIUM SERPL-MCNC: 10 MG/DL (ref 8.5–10.5)
CHLORIDE BLD-SCNC: 104 MMOL/L (ref 98–107)
CO2 SERPL-SCNC: 26 MMOL/L (ref 22–31)
CREAT SERPL-MCNC: 0.78 MG/DL (ref 0.6–1.1)
DIASTOLIC BLOOD PRESSURE - MUSE: NORMAL MMHG
ERYTHROCYTE [DISTWIDTH] IN BLOOD BY AUTOMATED COUNT: 13.2 % (ref 10–15)
GFR SERPL CREATININE-BSD FRML MDRD: 78 ML/MIN/1.73M2
GLUCOSE BLD-MCNC: 104 MG/DL (ref 70–125)
HCT VFR BLD AUTO: 41.7 % (ref 35–47)
HGB BLD-MCNC: 13.4 G/DL (ref 11.7–15.7)
INTERPRETATION ECG - MUSE: NORMAL
MCH RBC QN AUTO: 29.3 PG (ref 26.5–33)
MCHC RBC AUTO-ENTMCNC: 32.1 G/DL (ref 31.5–36.5)
MCV RBC AUTO: 91 FL (ref 78–100)
P AXIS - MUSE: 42 DEGREES
PLATELET # BLD AUTO: 231 10E3/UL (ref 150–450)
POTASSIUM BLD-SCNC: 4.4 MMOL/L (ref 3.5–5)
PR INTERVAL - MUSE: 104 MS
PROT SERPL-MCNC: 7.2 G/DL (ref 6–8)
QRS DURATION - MUSE: 88 MS
QT - MUSE: 362 MS
QTC - MUSE: 376 MS
R AXIS - MUSE: 39 DEGREES
RBC # BLD AUTO: 4.58 10E6/UL (ref 3.8–5.2)
SODIUM SERPL-SCNC: 139 MMOL/L (ref 136–145)
SYSTOLIC BLOOD PRESSURE - MUSE: NORMAL MMHG
T AXIS - MUSE: 43 DEGREES
TSH SERPL DL<=0.005 MIU/L-ACNC: 1.42 UIU/ML (ref 0.3–5)
VENTRICULAR RATE- MUSE: 65 BPM
WBC # BLD AUTO: 5.1 10E3/UL (ref 4–11)

## 2021-10-04 PROCEDURE — 71046 X-RAY EXAM CHEST 2 VIEWS: CPT | Mod: TC | Performed by: RADIOLOGY

## 2021-10-04 PROCEDURE — G0008 ADMIN INFLUENZA VIRUS VAC: HCPCS | Performed by: FAMILY MEDICINE

## 2021-10-04 PROCEDURE — 36415 COLL VENOUS BLD VENIPUNCTURE: CPT | Performed by: FAMILY MEDICINE

## 2021-10-04 PROCEDURE — 80053 COMPREHEN METABOLIC PANEL: CPT | Performed by: FAMILY MEDICINE

## 2021-10-04 PROCEDURE — 84443 ASSAY THYROID STIM HORMONE: CPT | Performed by: FAMILY MEDICINE

## 2021-10-04 PROCEDURE — 99215 OFFICE O/P EST HI 40 MIN: CPT | Mod: 25 | Performed by: FAMILY MEDICINE

## 2021-10-04 PROCEDURE — 90662 IIV NO PRSV INCREASED AG IM: CPT | Performed by: FAMILY MEDICINE

## 2021-10-04 PROCEDURE — 93010 ELECTROCARDIOGRAM REPORT: CPT | Mod: OFF | Performed by: GENERAL ACUTE CARE HOSPITAL

## 2021-10-04 PROCEDURE — 93005 ELECTROCARDIOGRAM TRACING: CPT | Performed by: FAMILY MEDICINE

## 2021-10-04 PROCEDURE — 85027 COMPLETE CBC AUTOMATED: CPT | Performed by: FAMILY MEDICINE

## 2021-10-04 NOTE — PATIENT INSTRUCTIONS
ECHO to fully evaluate the heart structure.    Also Nuclear Stress Test due to atypical chest pain.

## 2021-10-04 NOTE — LETTER
October 6, 2021      Francisca Williamson  [address] [place] [address]        Dear ,  We are writing to inform you of your test results.    Your test results fall within the expected range(s) or remain unchanged from previous results.  Please continue with current treatment plan.    Resulted Orders   CBC with platelets   Result Value Ref Range    WBC Count 5.1 4.0 - 11.0 10e3/uL    RBC Count 4.58 3.80 - 5.20 10e6/uL    Hemoglobin 13.4 11.7 - 15.7 g/dL    Hematocrit 41.7 35.0 - 47.0 %    MCV 91 78 - 100 fL    MCH 29.3 26.5 - 33.0 pg    MCHC 32.1 31.5 - 36.5 g/dL    RDW 13.2 10.0 - 15.0 %    Platelet Count 231 150 - 450 10e3/uL   TSH with free T4 reflex   Result Value Ref Range    TSH 1.42 0.30 - 5.00 uIU/mL   Comprehensive metabolic panel (BMP + Alb, Alk Phos, ALT, AST, Total. Bili, TP)   Result Value Ref Range    Sodium 139 136 - 145 mmol/L    Potassium 4.4 3.5 - 5.0 mmol/L    Chloride 104 98 - 107 mmol/L    Carbon Dioxide (CO2) 26 22 - 31 mmol/L    Anion Gap 9 5 - 18 mmol/L    Urea Nitrogen 9 8 - 22 mg/dL    Creatinine 0.78 0.60 - 1.10 mg/dL    Calcium 10.0 8.5 - 10.5 mg/dL    Glucose 104 70 - 125 mg/dL    Alkaline Phosphatase 64 45 - 120 U/L    AST 20 0 - 40 U/L    ALT 14 0 - 45 U/L    Protein Total 7.2 6.0 - 8.0 g/dL    Albumin 4.3 3.5 - 5.0 g/dL    Bilirubin Total 0.3 0.0 - 1.0 mg/dL    GFR Estimate 78 >60 mL/min/1.73m2      Comment:      As of July 11, 2021, eGFR is calculated by the CKD-EPI creatinine equation, without race adjustment. eGFR can be influenced by muscle mass, exercise, and diet. The reported eGFR is an estimation only and is only applicable if the renal function is stable.       If you have any questions or concerns, please call the clinic at the number listed above.       Sincerely,      Gil Paredes MD

## 2021-10-05 ENCOUNTER — HOSPITAL ENCOUNTER (OUTPATIENT)
Dept: CARDIOLOGY | Facility: HOSPITAL | Age: 66
End: 2021-10-05
Attending: FAMILY MEDICINE
Payer: MEDICARE

## 2021-10-05 ENCOUNTER — HOSPITAL ENCOUNTER (OUTPATIENT)
Dept: NUCLEAR MEDICINE | Facility: HOSPITAL | Age: 66
End: 2021-10-05
Attending: FAMILY MEDICINE
Payer: MEDICARE

## 2021-10-05 DIAGNOSIS — R07.89 ATYPICAL CHEST PAIN: ICD-10-CM

## 2021-10-05 LAB
CV STRESS CURRENT BP HE: NORMAL
CV STRESS CURRENT HR HE: 102
CV STRESS CURRENT HR HE: 104
CV STRESS CURRENT HR HE: 112
CV STRESS CURRENT HR HE: 112
CV STRESS CURRENT HR HE: 116
CV STRESS CURRENT HR HE: 118
CV STRESS CURRENT HR HE: 123
CV STRESS CURRENT HR HE: 123
CV STRESS CURRENT HR HE: 124
CV STRESS CURRENT HR HE: 127
CV STRESS CURRENT HR HE: 128
CV STRESS CURRENT HR HE: 128
CV STRESS CURRENT HR HE: 68
CV STRESS CURRENT HR HE: 83
CV STRESS CURRENT HR HE: 85
CV STRESS CURRENT HR HE: 86
CV STRESS CURRENT HR HE: 87
CV STRESS CURRENT HR HE: 88
CV STRESS CURRENT HR HE: 88
CV STRESS CURRENT HR HE: 92
CV STRESS CURRENT HR HE: 93
CV STRESS CURRENT HR HE: 93
CV STRESS CURRENT HR HE: 95
CV STRESS CURRENT HR HE: 97
CV STRESS DEVIATION TIME HE: NORMAL
CV STRESS ECHO PERCENT HR HE: NORMAL
CV STRESS EXERCISE STAGE HE: NORMAL
CV STRESS EXERCISE STAGE REACHED HE: NORMAL
CV STRESS FINAL RESTING BP HE: NORMAL
CV STRESS FINAL RESTING HR HE: 83
CV STRESS MAX HR HE: 129
CV STRESS MAX TREADMILL GRADE HE: 14
CV STRESS MAX TREADMILL SPEED HE: 3.4
CV STRESS PEAK DIA BP HE: NORMAL
CV STRESS PEAK SYS BP HE: NORMAL
CV STRESS PHASE HE: NORMAL
CV STRESS PROTOCOL HE: NORMAL
CV STRESS RESTING PT POSITION HE: NORMAL
CV STRESS RESTING PT POSITION HE: NORMAL
CV STRESS ST DEVIATION AMOUNT HE: NORMAL
CV STRESS ST DEVIATION ELEVATION HE: NORMAL
CV STRESS ST EVELATION AMOUNT HE: NORMAL
CV STRESS TEST TYPE HE: NORMAL
CV STRESS TOTAL STAGE TIME MIN 1 HE: NORMAL
STRESS ECHO BASELINE DIASTOLIC HE: 69
STRESS ECHO BASELINE HR: 69
STRESS ECHO BASELINE SYSTOLIC BP: 133
STRESS ECHO LAST STRESS DIASTOLIC BP: 62
STRESS ECHO LAST STRESS HR: 128
STRESS ECHO LAST STRESS SYSTOLIC BP: 154
STRESS ECHO POST ESTIMATED WORKLOAD: 7.3
STRESS ECHO POST EXERCISE DUR MIN: 6
STRESS ECHO POST EXERCISE DUR SEC: 0
STRESS ECHO TARGET HR: 152

## 2021-10-05 PROCEDURE — 78452 HT MUSCLE IMAGE SPECT MULT: CPT | Mod: 26 | Performed by: INTERNAL MEDICINE

## 2021-10-05 PROCEDURE — 93018 CV STRESS TEST I&R ONLY: CPT | Mod: ME | Performed by: INTERNAL MEDICINE

## 2021-10-05 PROCEDURE — G1004 CDSM NDSC: HCPCS

## 2021-10-05 PROCEDURE — A9500 TC99M SESTAMIBI: HCPCS | Performed by: FAMILY MEDICINE

## 2021-10-05 PROCEDURE — 343N000001 HC RX 343: Performed by: FAMILY MEDICINE

## 2021-10-05 PROCEDURE — 93016 CV STRESS TEST SUPVJ ONLY: CPT | Performed by: INTERNAL MEDICINE

## 2021-10-05 PROCEDURE — G1004 CDSM NDSC: HCPCS | Performed by: INTERNAL MEDICINE

## 2021-10-05 RX ADMIN — Medication 32 MILLICURIE: at 10:00

## 2021-10-05 RX ADMIN — Medication 8.68 MILLICURIE: at 08:40

## 2021-10-14 ENCOUNTER — IMMUNIZATION (OUTPATIENT)
Dept: NURSING | Facility: CLINIC | Age: 66
End: 2021-10-14
Payer: MEDICARE

## 2021-10-14 PROCEDURE — 0004A PR COVID VAC PFIZER DIL RECON 30 MCG/0.3 ML IM: CPT

## 2021-10-14 PROCEDURE — 91300 PR COVID VAC PFIZER DIL RECON 30 MCG/0.3 ML IM: CPT

## 2021-10-28 ENCOUNTER — HOSPITAL ENCOUNTER (OUTPATIENT)
Dept: CARDIOLOGY | Facility: HOSPITAL | Age: 66
Discharge: HOME | End: 2021-10-28
Attending: FAMILY MEDICINE | Admitting: FAMILY MEDICINE
Payer: MEDICARE

## 2021-10-28 DIAGNOSIS — R07.89 ATYPICAL CHEST PAIN: ICD-10-CM

## 2021-10-28 LAB — LVEF ECHO: NORMAL

## 2021-10-28 PROCEDURE — 93306 TTE W/DOPPLER COMPLETE: CPT | Mod: 26 | Performed by: INTERNAL MEDICINE

## 2021-10-28 PROCEDURE — 93306 TTE W/DOPPLER COMPLETE: CPT

## 2021-11-04 ENCOUNTER — E-VISIT (OUTPATIENT)
Dept: URGENT CARE | Facility: CLINIC | Age: 66
End: 2021-11-04
Payer: MEDICARE

## 2021-11-04 DIAGNOSIS — J01.90 ACUTE BACTERIAL SINUSITIS: ICD-10-CM

## 2021-11-04 DIAGNOSIS — J01.90 ACUTE SINUSITIS WITH SYMPTOMS > 10 DAYS: ICD-10-CM

## 2021-11-04 DIAGNOSIS — B96.89 ACUTE BACTERIAL SINUSITIS: ICD-10-CM

## 2021-11-04 PROCEDURE — 99421 OL DIG E/M SVC 5-10 MIN: CPT

## 2021-11-04 RX ORDER — DOXYCYCLINE HYCLATE 100 MG
100 TABLET ORAL 2 TIMES DAILY
Qty: 14 TABLET | Refills: 0 | Status: SHIPPED | OUTPATIENT
Start: 2021-11-04 | End: 2021-11-11

## 2021-11-04 NOTE — PATIENT INSTRUCTIONS
When to Use Antibiotics    Antibiotics are medicines used to treat infections caused by bacteria. They don t work for an illness caused by a virus. And they don't work for an allergic reaction. In fact, taking antibiotics for reasons other than an infection by bacteria can cause problems. You may have side effects from the medicine. And if you need an antibiotic in the future, it may not work well. This is because the bacteria can become immune to the medicine. You can also get a type of diarrhea that's hard to treat. This diarrhea is called C. diff.   When antibiotics likely won t help  Your healthcare provider won t usually give you antibiotics for the conditions listed below. You can help by not asking for them if you have:     A cold. This type of illness is caused by a virus. It can cause a runny nose, stuffed-up nose, sneezing, coughing, and headache. You may also have mild body aches and low fever. A cold gets better on its own in a few days to a week.    The flu (influenza). This is a respiratory illness caused by a virus. The flu usually goes away on its own in a week or so. It can cause fever, body aches, sore throat, and tiredness.    Bronchitis. This is an infection in the lungs. It is most often caused by a virus. You may have coughing, phlegm, body aches, and a low fever. A common type of bronchitis is known as a chest cold. This is called acute bronchitis. This often happens after you have a respiratory infection like a cold. Bronchitis can take weeks to go away. Antibiotics often don t help.    Most sore throats. Sore throats are most often caused by viruses. Your throat may feel scratchy or achy. It may hurt to swallow. You may also have a low fever and body aches. A sore throat usually gets better in a few days.    Most outer ear infections. An ear infection may be caused by a virus or bacteria. It causes pain in the ear. Antibiotics by mouth usually don t help. Low-dose antibiotic ear drops  work much better.    Some inner ear infections. An inner ear infection (otitis media) can be caused by a virus in the ear. It can also cause pain and a high fever. Most older children with low-grade fever don't need to be treated with antibiotics.    Most sinus infections. This is also known as sinusitis. This kind of infection causes sinus pain and swelling, and a runny nose. In most cases, it goes away on its own. Antibiotics don t make recovery quicker.    Allergic rhinitis. This is a set of symptoms caused by an allergic reaction. You may have sneezing, a runny nose, itchy or watery eyes, or a sore throat. Allergies are not treated with antibiotics.    Low fever. A mild fever that s less than 100.4 F (38 C) most likely doesn t need to be treated with antibiotics.   When antibiotics can help  Antibiotics can be used to treat:                                                       Strep throat. This is a throat infection caused by a certain type of bacteria. Symptoms of strep throat include a sore throat, white patches on the tonsils, red spots on the roof of the mouth, fever, body aches, and nausea and vomiting. Strep throat almost never causes a cough.    Urinary tract infection (UTI). This is an infection of the bladder and the tube that takes urine out of the body. It is caused by bacteria. It can cause burning pain and urine that s cloudy or tinted with blood. UTIs are very common. Antibiotics usually help treat them.    Some outer ear infections. In some cases, a healthcare provider may prescribe antibiotics by mouth for an ear infection. You may need a test to show the cause of the ear infection.    Some sinus infections. In some cases, your healthcare provider may give you antibiotics. He or she may first need to make sure your symptoms aren t caused by something else. This may be a virus, fungus, allergies, or air pollutants such as smoke.   Your healthcare provider may give you antibiotics if you have a  condition that can affect your immune system. This includes diabetes or cancer.  Self-care at home  If your infection can t be treated with antibiotics, you can take other steps to feel better. Try the remedies below. In general:     Rest and sleep as much as needed.    Drink water and other clear fluids.    Don t smoke. Stay away from smoke from other people.    Use over-the-counter medicine such as acetaminophen or ibuprofen to ease pain or fever, as directed by your healthcare provider.  To treat sinus pain or nasal stuffiness:    Put a warm, moist cloth on your face where you feel sinus pain or pressure.    Try a nasal spray with medicine or saline. Use as directed by your healthcare provider.    Breathe in steam from a hot shower.    Use a humidifier or cool mist vaporizer.   To quiet a cough:     Use a humidifier or cool mist vaporizer.    Breathe in steam from a hot shower.    Suck on cough lozenges.   To sooth a sore throat:     Suck on ice chips, frozen ice pops, or lozenges.    Use a sore throat spray.    Use a humidifier or cool mist vaporizer.    Gargle with saltwater.    Drink warm liquids.    Take ibuprofen to reduce swelling and pain.  To ease ear pain:     Hold a warm, moist washcloth on the ear for 10 minutes at a time.  [employer] last reviewed this educational content on 12/1/2019 2000-2021 [employer]. All rights reserved. This information is not intended as a substitute for professional medical care. Always follow your healthcare professional's instructions.          Sinusitis (Antibiotic Treatment)    The sinuses are air-filled spaces within the bones of the face. They connect to the inside of the nose. Sinusitis is an inflammation of the tissue that lines the sinuses. Sinusitis can occur during a cold. It can also happen due to allergies to pollens and other particles in the air. Sinusitis can cause symptoms of sinus congestion and a feeling of fullness. A sinus infection causes  fever, headache, and facial pain. There is often green or yellow fluid draining from the nose or into the back of the throat (post-nasal drip). You have been given antibiotics to treat this condition.   Home care    Take the full course of antibiotics as instructed. Don't stop taking them, even when you feel better.    Drink plenty of water, hot tea, and other liquids as directed by the healthcare provider. This may help thin nasal mucus. It also may help your sinuses drain fluids.    Heat may help soothe painful areas of your face. Use a towel soaked in hot water. Or,  the shower and direct the warm spray onto your face. Using a vaporizer along with a menthol rub at night may also help soothe symptoms.     An expectorant with guaifenesin may help thin nasal mucus and help your sinuses drain fluids. Talk with your provider or pharmacists before taking an over-the-counter (OTC) medicine if you have any questions about it or its side effects..    You can use an OTC decongestant, unless a similar medicine was prescribed to you. Nasal sprays work the fastest. Use one that contains phenylephrine or oxymetazoline. First blow your nose gently. Then use the spray. Don't use these medicines more often than directed on the label. If you do, your symptoms may get worse. You may also take pills that contain pseudoephedrine. Don t use products that combine multiple medicines. This is because side effects may be increased. Read labels. You can also ask the pharmacist for help. (People with high blood pressure should not use decongestants. They can raise blood pressure.) Talk with your provider or pharmacist if you have any questions about the medicine..    OTC antihistamines may help if allergies contributed to your sinusitis. Talk with your provider or pharmacist if you have any questions about the medicine..    Don't use nasal rinses or irrigation during an acute sinus infection, unless your healthcare provider tells  you to. Rinsing may spread the infection to other areas in your sinuses.    Use acetaminophen or ibuprofen to control pain, unless another pain medicine was prescribed to you. If you have chronic liver or kidney disease or ever had a stomach ulcer, talk with your healthcare provider before using these medicines. Never give aspirin to anyone under age 18 who is ill with a fever. It may cause severe liver damage.    Don't smoke. This can make symptoms worse.    Follow-up care  Follow up with your healthcare provider, or as advised.   When to seek medical advice  Call your healthcare provider if any of these occur:     Facial pain or headache that gets worse    Stiff neck    Unusual drowsiness or confusion    Swelling of your forehead or eyelids    Symptoms don't go away in 10 days    Vision problems, such as blurred or double vision    Fever of 100.4 F (38 C) or higher, or as directed by your healthcare provider  Call 911  Call 911 if any of these occur:     Seizure    Trouble breathing    Feeling dizzy or faint    Fingernails, skin or lips look blue, purple , or gray  Prevention  Here are steps you can take to help prevent an infection:     Keep good hand washing habits.    Don t have close contact with people who have sore throats, colds, or other upper respiratory infections.    Don t smoke, and stay away from secondhand smoke.    Stay up to date with of your vaccines.  [employer] last reviewed this educational content on 12/1/2019 2000-2021 [employer]. All rights reserved. This information is not intended as a substitute for professional medical care. Always follow your healthcare professional's instructions.        Dear Francisca Williamson    After reviewing your responses, I've been able to diagnose you with?a sinus infection caused by bacteria.?     Based on your responses and diagnosis, I have prescribed doxycycline to treat your symptoms. I have sent this to your pharmacy.?     It is also important  to stay well hydrated, get lots of rest and take over-the-counter decongestants,?tylenol?or ibuprofen if you?are able to?take those medications per your primary care provider to help relieve discomfort.?     It is important that you take?all of?your prescribed medication even if your symptoms are improving after a few doses.? Taking?all of?your medicine helps prevent the symptoms from returning.?     If your symptoms worsen, you develop severe headache, vomiting, high fever (>102), or are not improving in 7 days, please contact your primary care provider for an appointment or visit any of our convenient Walk-in Care or Urgent Care Centers to be seen which can be found on our website?here.?     Thanks again for choosing?us?as your health care partner,?   ?  Dorota Horne MD?

## 2021-11-06 ENCOUNTER — NURSE TRIAGE (OUTPATIENT)
Dept: NURSING | Facility: CLINIC | Age: 66
End: 2021-11-06

## 2021-11-06 NOTE — TELEPHONE ENCOUNTER
Patient reports she has taken 3 doses of her antibiotic and is having sore throat and chancre sores. Patient says sinusitis is still present but pain seems a bit lessened.    Disposition: home care  Reviewed care advice with caller per RN triage protocol guideline. Advised to call back with worsening symptoms, concerns or questions. Caller verbalized understanding.            Reason for Disposition    [1] Taking antibiotic < 72 hours (3 days) AND [2] sinus pain not improved    Additional Information    Negative: Severe difficulty breathing (e.g., struggling for each breath, speaks in single words)    Negative: Sounds like a life-threatening emergency to the triager    Negative: [1] Difficulty breathing AND [2] not from stuffy nose (e.g., not relieved by cleaning out the nose)    Negative: [1] SEVERE headache AND [2] fever    Negative: [1] Taking antibiotic > 24 hours AND [2] fever > 103 F (39.4 C)    Negative: [1] Redness or swelling on the cheek, forehead or around the eye AND [2] fever    Negative: Patient sounds very sick or weak to the triager    Negative: [1] SEVERE sinus pain AND [2] not improved 2 hours after pain medicine    Negative: [1] Redness or swelling on the cheek, forehead or around the eye AND [2] new since starting antibiotics    Negative: [1] Taking antibiotic > 48 hours (2 days) AND [2] fever persists    Negative: [1] Taking antibiotic > 72 hours (3 days) AND [2] sinus pain not improved    Negative: [1] Taking antibiotic > 7 days AND [2] nasal discharge not improved    Negative: [1] Taking antibiotic < 48 hours AND [2] fever persists    Protocols used: SINUS INFECTION ON ANTIBIOTIC FOLLOW-UP CALL-A-

## 2021-11-08 ENCOUNTER — OFFICE VISIT (OUTPATIENT)
Dept: FAMILY MEDICINE | Facility: CLINIC | Age: 66
End: 2021-11-08
Payer: MEDICARE

## 2021-11-08 ENCOUNTER — NURSE TRIAGE (OUTPATIENT)
Dept: NURSING | Facility: CLINIC | Age: 66
End: 2021-11-08

## 2021-11-08 VITALS
DIASTOLIC BLOOD PRESSURE: 70 MMHG | HEART RATE: 80 BPM | RESPIRATION RATE: 12 BRPM | OXYGEN SATURATION: 99 % | BODY MASS INDEX: 24.25 KG/M2 | SYSTOLIC BLOOD PRESSURE: 120 MMHG | WEIGHT: 148 LBS

## 2021-11-08 DIAGNOSIS — B30.9 VIRAL CONJUNCTIVITIS OF LEFT EYE: ICD-10-CM

## 2021-11-08 DIAGNOSIS — J01.90 ACUTE BACTERIAL SINUSITIS: Primary | ICD-10-CM

## 2021-11-08 DIAGNOSIS — B96.89 ACUTE BACTERIAL SINUSITIS: Primary | ICD-10-CM

## 2021-11-08 PROBLEM — K57.30 DIVERTICULOSIS OF LARGE INTESTINE WITHOUT HEMORRHAGE: Status: ACTIVE | Noted: 2020-05-11

## 2021-11-08 PROBLEM — K57.30 DIVERTICULAR DISEASE OF LARGE INTESTINE: Status: ACTIVE | Noted: 2021-02-17

## 2021-11-08 PROBLEM — K59.04 CHRONIC IDIOPATHIC CONSTIPATION: Status: ACTIVE | Noted: 2021-11-08

## 2021-11-08 PROBLEM — F51.01 INSOMNIA, IDIOPATHIC: Status: ACTIVE | Noted: 2019-09-01

## 2021-11-08 PROBLEM — K58.9 IBS (IRRITABLE BOWEL SYNDROME): Status: ACTIVE | Noted: 2021-11-08

## 2021-11-08 PROBLEM — K63.5 POLYP OF COLON: Status: ACTIVE | Noted: 2021-02-17

## 2021-11-08 PROBLEM — M85.80 OSTEOPENIA: Status: ACTIVE | Noted: 2020-05-11

## 2021-11-08 PROBLEM — D12.0 BENIGN NEOPLASM OF CECUM: Status: ACTIVE | Noted: 2021-02-19

## 2021-11-08 PROCEDURE — U0005 INFEC AGEN DETEC AMPLI PROBE: HCPCS | Performed by: FAMILY MEDICINE

## 2021-11-08 PROCEDURE — 99214 OFFICE O/P EST MOD 30 MIN: CPT | Performed by: FAMILY MEDICINE

## 2021-11-08 PROCEDURE — U0003 INFECTIOUS AGENT DETECTION BY NUCLEIC ACID (DNA OR RNA); SEVERE ACUTE RESPIRATORY SYNDROME CORONAVIRUS 2 (SARS-COV-2) (CORONAVIRUS DISEASE [COVID-19]), AMPLIFIED PROBE TECHNIQUE, MAKING USE OF HIGH THROUGHPUT TECHNOLOGIES AS DESCRIBED BY CMS-2020-01-R: HCPCS | Performed by: FAMILY MEDICINE

## 2021-11-08 RX ORDER — PSEUDOEPHEDRINE HCL 30 MG
60 TABLET ORAL EVERY 4 HOURS PRN
Qty: 50 TABLET | Refills: 3 | Status: SHIPPED | OUTPATIENT
Start: 2021-11-08 | End: 2021-12-13

## 2021-11-08 RX ORDER — LEVOFLOXACIN 750 MG/1
750 TABLET, FILM COATED ORAL DAILY
Qty: 10 TABLET | Refills: 0 | Status: SHIPPED | OUTPATIENT
Start: 2021-11-08 | End: 2021-11-18

## 2021-11-08 NOTE — PATIENT INSTRUCTIONS
Patient Education     Understanding Acute Rhinosinusitis  Acute rhinosinusitis is when the lining of the inside of the nose and the sinuses becomes irritated and swollen. It is also called sinusitis, or a sinus infection.  Sinuses are air-filled spaces in the skull behind the face. They are kept moist and clean by a lining of mucosa. Things such as pollen, smoke, and chemical fumes can irritate the mucosa. It can then swell up. As a response to irritation, the mucosa makes more mucus and other fluids. Tiny hairlike cilia cover the mucosa. Cilia help carry mucus toward the opening of the sinus. Too much mucus may cause the cilia to stop working. This blocks the sinus opening. A buildup of fluid in the sinuses then causes pain and pressure. It can also cause bacteria to grow in the sinuses.    What causes acute rhinosinusitis?  A sinus infection is most often caused by a virus. You are more likely to get one after having a cold or the flu. In some cases, a sinus infection can be caused by bacteria.  You are at higher risk for a sinus infection if you:    Are older in age    Have structural problems with your sinuses    Smoke or are exposed to secondhand smoke    Are exposed to changes in pressure, such as from flying a lot or deep sea diving    Have asthma or allergies    Have a weak immune system    Have dental disease     Symptoms of acute rhinosinusitis  Symptoms of acute rhinosinusitis often last around 7 to 10 days. If you have a bacterial infection, they may last longer. They may also get better but then worsen. You may have:    Face pain or pressure under the eyes and around the nose    Headache    Fluid draining in the back of the throat (postnasal drip)    Congestion    Drainage that is thick and colored (often green), instead of clear    Cough    Problems with your sense of smell    Ear pain or hearing problems    Fever    Tooth pain    Fatigue  Diagnosing acute rhinosinusitis  Your healthcare provider  will ask about your symptoms and past health. He or she will look at your ears, nose, throat, and sinuses. Imaging tests, such as X-rays, are often not needed.  It can be hard to figure out if a sinus infection is caused by a virus or bacterium. A bacterial infection tends to last longer. Symptoms may also get better but then worsen. Your healthcare provider may take a sample of mucus from your nose to check for bacteria.  Treating acute rhinosinusitis  Most sinus infections will go away within 10 days. Your body will fight off the virus. If your symptoms seem to get better but then worsen, you may have a bacterial infection instead. Your healthcare provider will then give you antibiotics. Take this medicine until it is gone, even if you feel better.  To help ease your symptoms, your healthcare provider may advise:    Over-the-counter pain relievers. Medicines such as acetaminophen or ibuprofen can ease sinus pain. They may also lower a fever.    Nasal washes. Washing your nasal passages with salt water may ease pain and pressure. It can rinse out mucous and other irritants from your sinuses. Your healthcare provider can show you how to do it.    Nasal steroid spray. This prescription medicine can reduce inflammation in your sinuses.    Other medicines. Decongestants, antihistamines, and other nasal sprays may give short-term relief. They may help with congestion. Talk with your healthcare provider before taking these medicines.     Preventing acute rhinosinusitis  You can help prevent a sinus infection with these steps:    Wash your hands well and often.    Stay away from people who have a cold or upper respiratory infection.    Don't smoke. And stay away from secondhand smoke.    Use a humidifier at home.    Make sure you are up-to-date on your vaccines, such as the flu shot.     When to call your healthcare provider  Call your healthcare provider right away if you have any of these:    Fever of 100.4 F (38 C) or  higher, or as directed by your healthcare provider    Pain that gets worse    Symptoms that don t get better, or get worse    New symptoms  Melanie last reviewed this educational content on 6/1/2019 2000-2021 [employer]. All rights reserved. This information is not intended as a substitute for professional medical care. Always follow your healthcare professional's instructions.

## 2021-11-08 NOTE — PROGRESS NOTES
"Office Visit  [facility]  Date of Service: Nov 8, 2021      Subjective   Francisca Williamson is a 66 year old female who presents for   Chief Complaint   Patient presents with     Sinus Problem     Headache     Eye Problem     Face pain, eye discomfort and headache  Symptoms started in September.  Since then, she has had episodes lasting several days each of nasal congestion, headaches, body aches.  She has had some soreness in her left cheek and soreness of the left eye.  She is unable to wear her contact lenses due to the eye discomfort.  The eye feels \"irritated\" when she closes the lid.  It does not feel like it is scratched.    She has had multiple Covid tests that were negative.  The most recent episode of illness started on 10/24/2021.  She did a double test at home for Covid and that was negative.    Emailed PCP last week - recommended evisit,which they did. Started doxycycline Fri - she has been on it for three days. Developed sores in mouth and sore throat. But no significant improvement.    Also had a cold sore somewhere in there. Treated with valacyclovir. Cold sore getting better, but rest of symptoms aren't.  Had shingles 2005. No rash with current symptoms.     Really bad headache last night.  Occipital/globa lheadache  Took tylenol  Couldn't sleep    Takes care of grandkids - periodic illness, Covid has been at their schools.  Has had three shots for Covid, last 10/14/21.  Son had covid in early Sept. Not exposed to him.   Might have allergies. Not sure, hasn't been diagnosed with it before.   Only had sinus problems once, years ago  Has plugged ears  No fever.  No cough/breathing problems   Has IBS and it's controlled - doxycycline causing loose stool and spasm    Objective   /70 (BP Location: Left arm, Patient Position: Sitting, Cuff Size: Adult Regular)   Pulse 80   Resp 12   Wt 67.1 kg (148 lb)   SpO2 99%   BMI 24.25 kg/m     She reports that she has " never smoked. She has never used smokeless tobacco.    Gen: Alert, no apparent distress.  Ears: canals clear, tympanic membranes clear without effusion. TMs dull and retracted.  Eyes: Left conjunctivitis without watering or discharge.  Sinuses: Tender in bilateral frontal and bilateral maxillary sinuses - worst over left maxillary.  Nose: erythematous swollen mucosa R>L  Mouth: adequate dentition. Healing coldsore left corner of mouth.  Tonsils/oropharynx: no tonsillar hypertrophy. Peritonsillar area is erythematous.   Neck: mild lymphadenopathy, thyroid not enlarged, not tender.    Heart: Regular rate and rhythm, no murmurs.  Lungs: Clear to auscultation bilaterally, no increased work of breathing.  Abdomen: Soft, non-tender, non-distended, bowel sounds normal.  Extremities: No clubbing, cyanosis, edema  No results found for any visits on 11/08/21.      Assessment & Plan     1. Acute bacterial sinusitis with failure of treatment with doxycycline for 3 days.     Discontinue doxycycline    Start levofloxacin 750 mg daily for 10 days    Decongestants: Sudafed, humidity.    Pain: acetaminophen, stomach doesn't tolerate ibuprofen.    Recheck covid PCR.  2. Conjunctivitis, left    Likely viral.    If not significantly improved in 2-3 days will need to see ophtho.      Order Summary                                                      Acute bacterial sinusitis  -     levofloxacin (LEVAQUIN) 750 MG tablet; Take 1 tablet (750 mg) by mouth daily for 10 days  -     Symptomatic COVID-19 Virus (Coronavirus) by PCR; Future  -     pseudoePHEDrine (SUDAFED) 30 MG tablet; Take 2 tablets (60 mg) by mouth every 4 hours as needed for congestion  -     Symptomatic COVID-19 Virus (Coronavirus) by PCR Nose    Viral conjunctivitis of left eye            Future Appointments   Date Time Provider Department Center   12/6/2021 10:30 AM Dorota Horne MD [facility]       Completed by: Francisca Gonzalez M.D., [facility].  11/8/2021 11:21 AM.  This transcription uses voice recognition software, which may contain typographical errors.

## 2021-11-08 NOTE — TELEPHONE ENCOUNTER
"Patient calling stating she started Doxycycline x 3 days ago (11/5/21)  for a sinus infection.    Onset of symptoms 10/24/21. Increasing headache starting last evening.  \"I got the worst headache last night.\" Stating headache was more generalized. Reporting she has never had a headache that bad.    Pain level rating \"9\" on 1-10 pain scale last evening.   Patient is taking Tylenol 1000 mg at 9 pm with some relief around 11 pm and was able to sleep waking again at 3 a.m. Last dose of Tylenol at 3 a.m. , applied heating pad with some relief.  Afebrile. Reporting some possible mild eye swelling around left eye.  Reporting COVID 19 negative testing in past 2 weeks.  Disposition to see provider with in 24 hours.    Transferred to Central Scheduling.    Krystin Stevens RN  [facility]      COVID 19 Nurse Triage Plan/Patient Instructions    Please be aware that novel coronavirus (COVID-19) may be circulating in the community. If you develop symptoms such as fever, cough, or SOB or if you have concerns about the presence of another infection including coronavirus (COVID-19), please contact your health care provider or visit [email or web address].     Disposition/Instructions    In-Person Visit with provider recommended. Reference Visit Selection Guide.    Thank you for taking steps to prevent the spread of this virus.  o Limit your contact with others.  o Wear a simple mask to cover your cough.  o Wash your hands well and often.    Resources    [facility]: About COVID-19: [email or web address]    CDC: What to Do If You're Sick: www.cdc.gov/coronavirus/2019-ncov/about/steps-when-sick.html    CDC: Ending Home Isolation: www.cdc.gov/coronavirus/2019-ncov/hcp/disposition-in-home-patients.html     CDC: Caring for Someone: www.cdc.gov/coronavirus/2019-ncov/if-you-are-sick/care-for-someone.html     ADE: Interim Guidance for Hospital Discharge to Home: " www.health.[facility].[place]/diseases/coronavirus/hcp/hospdischarge.pdf    [facility] clinical trials (COVID-19 research studies): [email or web address]     Below are the COVID-19 hotlines at the [place] Department of Health ([facility]). Interpreters are available.   o For health questions: Call 808-223-8749 or 1-475.992.6883 (7 a.m. to 7 p.m.)  o For questions about schools and childcare: Call 421-492-9788 or 1-178.908.5233 (7 a.m. to 7 p.m.)                       Reason for Disposition    Taking antibiotic > 72 hours (3 days) and sinus pain not improved    Additional Information    Negative: Severe difficulty breathing (e.g., struggling for each breath, speaks in single words)    Negative: Sounds like a life-threatening emergency to the triager    Negative: Difficulty breathing and not from stuffy nose (e.g., not relieved by cleaning out the nose)    Negative: SEVERE headache and fever    Negative: Taking antibiotic > 24 hours and fever > 103 F (39.4 C)    Negative: Redness or swelling on the cheek, forehead or around the eye and fever    Negative: Patient sounds very sick or weak to the triager    Negative: SEVERE sinus pain and not improved 2 hours after pain medicine    Negative: Redness or swelling on the cheek, forehead or around the eye and new since starting antibiotics    Negative: Taking antibiotic > 48 hours (2 days) and fever persists    Protocols used: SINUS INFECTION ON ANTIBIOTIC FOLLOW-UP CALL-A-[place]

## 2021-11-09 ENCOUNTER — MYC MEDICAL ADVICE (OUTPATIENT)
Dept: FAMILY MEDICINE | Facility: CLINIC | Age: 66
End: 2021-11-09
Payer: MEDICARE

## 2021-11-09 LAB — SARS-COV-2 RNA RESP QL NAA+PROBE: NEGATIVE

## 2021-12-06 ENCOUNTER — TRANSFERRED RECORDS (OUTPATIENT)
Dept: HEALTH INFORMATION MANAGEMENT | Facility: CLINIC | Age: 66
End: 2021-12-06

## 2021-12-10 ASSESSMENT — ACTIVITIES OF DAILY LIVING (ADL): CURRENT_FUNCTION: NO ASSISTANCE NEEDED

## 2021-12-13 ENCOUNTER — OFFICE VISIT (OUTPATIENT)
Dept: FAMILY MEDICINE | Facility: CLINIC | Age: 66
End: 2021-12-13
Payer: MEDICARE

## 2021-12-13 ENCOUNTER — MYC MEDICAL ADVICE (OUTPATIENT)
Dept: FAMILY MEDICINE | Facility: CLINIC | Age: 66
End: 2021-12-13

## 2021-12-13 VITALS
WEIGHT: 143.25 LBS | DIASTOLIC BLOOD PRESSURE: 61 MMHG | SYSTOLIC BLOOD PRESSURE: 118 MMHG | HEART RATE: 72 BPM | RESPIRATION RATE: 16 BRPM | BODY MASS INDEX: 23.02 KG/M2 | HEIGHT: 66 IN

## 2021-12-13 DIAGNOSIS — M81.0 OSTEOPOROSIS, UNSPECIFIED OSTEOPOROSIS TYPE, UNSPECIFIED PATHOLOGICAL FRACTURE PRESENCE: ICD-10-CM

## 2021-12-13 DIAGNOSIS — E11.9 TYPE 2 DIABETES MELLITUS WITHOUT COMPLICATION, WITHOUT LONG-TERM CURRENT USE OF INSULIN (H): ICD-10-CM

## 2021-12-13 DIAGNOSIS — K59.4 RECTAL SPASM: ICD-10-CM

## 2021-12-13 DIAGNOSIS — Z00.01 ENCOUNTER FOR GENERAL ADULT MEDICAL EXAMINATION WITH ABNORMAL FINDINGS: Primary | ICD-10-CM

## 2021-12-13 DIAGNOSIS — K59.04 CHRONIC IDIOPATHIC CONSTIPATION: ICD-10-CM

## 2021-12-13 DIAGNOSIS — K57.30 DIVERTICULAR DISEASE OF LARGE INTESTINE: ICD-10-CM

## 2021-12-13 DIAGNOSIS — R42 DIZZINESS: ICD-10-CM

## 2021-12-13 DIAGNOSIS — K58.1 IRRITABLE BOWEL SYNDROME WITH CONSTIPATION: ICD-10-CM

## 2021-12-13 DIAGNOSIS — E53.8 B12 DEFICIENCY: ICD-10-CM

## 2021-12-13 DIAGNOSIS — K59.4 PROCTALGIA FUGAX: ICD-10-CM

## 2021-12-13 DIAGNOSIS — H93.8X3 PLUGGED FEELING IN EAR, BILATERAL: ICD-10-CM

## 2021-12-13 DIAGNOSIS — K90.0 CELIAC DISEASE: ICD-10-CM

## 2021-12-13 DIAGNOSIS — R09.81 NASAL CONGESTION: ICD-10-CM

## 2021-12-13 DIAGNOSIS — E78.5 HYPERLIPIDEMIA, UNSPECIFIED HYPERLIPIDEMIA TYPE: ICD-10-CM

## 2021-12-13 DIAGNOSIS — K62.89 ANAL OR RECTAL PAIN: ICD-10-CM

## 2021-12-13 DIAGNOSIS — R51.9 SINUS HEADACHE: ICD-10-CM

## 2021-12-13 DIAGNOSIS — H81.10 BENIGN PAROXYSMAL POSITIONAL VERTIGO, UNSPECIFIED LATERALITY: ICD-10-CM

## 2021-12-13 DIAGNOSIS — K63.5 POLYP OF COLON, UNSPECIFIED PART OF COLON, UNSPECIFIED TYPE: ICD-10-CM

## 2021-12-13 DIAGNOSIS — E55.9 VITAMIN D DEFICIENCY: ICD-10-CM

## 2021-12-13 LAB
ALBUMIN SERPL-MCNC: 4.3 G/DL (ref 3.5–5)
ALP SERPL-CCNC: 56 U/L (ref 45–120)
ALT SERPL W P-5'-P-CCNC: 15 U/L (ref 0–45)
ANION GAP SERPL CALCULATED.3IONS-SCNC: 11 MMOL/L (ref 5–18)
AST SERPL W P-5'-P-CCNC: 20 U/L (ref 0–40)
BILIRUB SERPL-MCNC: 0.4 MG/DL (ref 0–1)
BUN SERPL-MCNC: 12 MG/DL (ref 8–22)
CALCIUM SERPL-MCNC: 9.7 MG/DL (ref 8.5–10.5)
CHLORIDE BLD-SCNC: 105 MMOL/L (ref 98–107)
CHOLEST SERPL-MCNC: 162 MG/DL
CO2 SERPL-SCNC: 24 MMOL/L (ref 22–31)
CREAT SERPL-MCNC: 0.81 MG/DL (ref 0.6–1.1)
ERYTHROCYTE [DISTWIDTH] IN BLOOD BY AUTOMATED COUNT: 13.3 % (ref 10–15)
FASTING STATUS PATIENT QL REPORTED: YES
GFR SERPL CREATININE-BSD FRML MDRD: 75 ML/MIN/1.73M2
GLUCOSE BLD-MCNC: 101 MG/DL (ref 70–125)
HBA1C MFR BLD: 5.5 % (ref 0–5.6)
HCT VFR BLD AUTO: 43.1 % (ref 35–47)
HDLC SERPL-MCNC: 57 MG/DL
HGB BLD-MCNC: 13.6 G/DL (ref 11.7–15.7)
LDLC SERPL CALC-MCNC: 91 MG/DL
MCH RBC QN AUTO: 29.1 PG (ref 26.5–33)
MCHC RBC AUTO-ENTMCNC: 31.6 G/DL (ref 31.5–36.5)
MCV RBC AUTO: 92 FL (ref 78–100)
PLATELET # BLD AUTO: 213 10E3/UL (ref 150–450)
POTASSIUM BLD-SCNC: 4.1 MMOL/L (ref 3.5–5)
PROT SERPL-MCNC: 7.1 G/DL (ref 6–8)
PTH-INTACT SERPL-MCNC: 86 PG/ML (ref 10–86)
RBC # BLD AUTO: 4.67 10E6/UL (ref 3.8–5.2)
SODIUM SERPL-SCNC: 140 MMOL/L (ref 136–145)
TRIGL SERPL-MCNC: 72 MG/DL
WBC # BLD AUTO: 5.2 10E3/UL (ref 4–11)

## 2021-12-13 PROCEDURE — G0439 PPPS, SUBSEQ VISIT: HCPCS | Performed by: FAMILY MEDICINE

## 2021-12-13 PROCEDURE — 83036 HEMOGLOBIN GLYCOSYLATED A1C: CPT | Performed by: FAMILY MEDICINE

## 2021-12-13 PROCEDURE — 80061 LIPID PANEL: CPT | Performed by: FAMILY MEDICINE

## 2021-12-13 PROCEDURE — 82607 VITAMIN B-12: CPT | Performed by: FAMILY MEDICINE

## 2021-12-13 PROCEDURE — 99213 OFFICE O/P EST LOW 20 MIN: CPT | Mod: 25 | Performed by: FAMILY MEDICINE

## 2021-12-13 PROCEDURE — 80053 COMPREHEN METABOLIC PANEL: CPT | Performed by: FAMILY MEDICINE

## 2021-12-13 PROCEDURE — 82306 VITAMIN D 25 HYDROXY: CPT | Performed by: FAMILY MEDICINE

## 2021-12-13 PROCEDURE — 85027 COMPLETE CBC AUTOMATED: CPT | Performed by: FAMILY MEDICINE

## 2021-12-13 PROCEDURE — 83970 ASSAY OF PARATHORMONE: CPT | Performed by: FAMILY MEDICINE

## 2021-12-13 PROCEDURE — 36415 COLL VENOUS BLD VENIPUNCTURE: CPT | Performed by: FAMILY MEDICINE

## 2021-12-13 ASSESSMENT — MIFFLIN-ST. JEOR: SCORE: 1188.59

## 2021-12-13 ASSESSMENT — ACTIVITIES OF DAILY LIVING (ADL): CURRENT_FUNCTION: NO ASSISTANCE NEEDED

## 2021-12-13 NOTE — PROGRESS NOTES
"SUBJECTIVE:   Francisca Williamson is a 66 year old female who presents for Preventive Visit.    Patient has been advised of split billing requirements and indicates understanding: Yes   Are you in the first 12 months of your Medicare coverage?  No    Sinus Problem     Healthy Habits:     In general, how would you rate your overall health?  Fair    Frequency of exercise:  6-7 days/week    Duration of exercise:  30-45 minutes    Do you usually eat at least 4 servings of fruit and vegetables a day, include whole grains    & fiber and avoid regularly eating high fat or \"junk\" foods?  Yes    Taking medications regularly:  Yes    Ability to successfully perform activities of daily living:  No assistance needed    Home Safety:  No safety concerns identified    Hearing Impairment:  No hearing concerns    In the past 6 months, have you been bothered by leaking of urine?  No    In general, how would you rate your overall mental or emotional health?  Good      PHQ-2 Total Score: 0    Additional concerns today:  Yes    Chief Complaint   Patient presents with     Wellness Visit     Sinus Problem     nasal congestion, headaches     rectal pain     Dizziness     For osteoporosis she has tried calcium, fosamax and reclast which caused stomach upset     Do you feel safe in your environment? Yes    Have you ever done Advance Care Planning? (For example, a Health Directive, POLST, or a discussion with a medical provider or your loved ones about your wishes): Yes, patient states has an Advance Care Planning document and will bring a copy to the clinic.       Fall risk  Fallen 2 or more times in the past year?: No  Any fall with injury in the past year?: No    Cognitive Screening   1) Repeat 3 items (Leader, Season, Table)    2) Clock draw: NORMAL  3) 3 item recall: Recalls 3 objects  Results: 3 items recalled: COGNITIVE IMPAIRMENT LESS LIKELY    Mini-CogTM Copyright DONYA Mcqueen. Licensed by the author for use in [facility]; " reprinted with permission ([email or web address]). All rights reserved.      Do you have sleep apnea, excessive snoring or daytime drowsiness?: no    Reviewed and updated as needed this visit by clinical staff   Allergies  Meds             Reviewed and updated as needed this visit by Provider               Social History     Tobacco Use     Smoking status: Never Smoker     Smokeless tobacco: Never Used   Substance Use Topics     Alcohol use: Yes     Alcohol/week: 7.0 standard drinks         Alcohol Use 12/10/2021   Prescreen: >3 drinks/day or >7 drinks/week? No               Current providers sharing in care for this patient include:   Patient Care Team:  Dorota Horne MD as PCP - General  Dorota Horne MD as Assigned PCP  Caridad Bliss MD as Assigned Allergy Provider    The following health maintenance items are reviewed in Epic and correct as of today:  Health Maintenance Due   Topic Date Due     MICROALBUMIN  Never done     DIABETIC FOOT EXAM  Never done     ANNUAL REVIEW OF HM ORDERS  Never done     EYE EXAM  Never done     ZOSTER IMMUNIZATION (1 of 2) Never done     A1C  09/21/2021     MEDICARE ANNUAL WELLNESS VISIT  10/06/2021     LIPID  10/06/2021     Lab work is in process      FHS-7:   Breast CA Risk Assessment (FHS-7) 8/11/2021 12/10/2021   Did any of your first-degree relatives have breast or ovarian cancer? No No   Did any of your relatives have bilateral breast cancer? Unknown Yes   Did any man in your family have breast cancer? No No   Did any woman in your family have breast and ovarian cancer? No Yes   Did any woman in your family have breast cancer before age 50 y? No Yes   Do you have 2 or more relatives with breast and/or ovarian cancer? No No   Do you have 2 or more relatives with breast and/or bowel cancer? No Yes     Mammogram Screening: Recommended mammography every 1-2 years with patient discussion and risk factor consideration  Pertinent mammograms are reviewed under the imaging  "tab.    Review of Systems  Constitutional, HEENT, cardiovascular, pulmonary, gi and gu systems are negative, except as otherwise noted.    OBJECTIVE:   /61 (BP Location: Left arm, Patient Position: Sitting, Cuff Size: Adult Regular)   Pulse 72   Resp 16   Ht 1.664 m (5' 5.5\")   Wt 65 kg (143 lb 4 oz)   Breastfeeding No   BMI 23.48 kg/m   Estimated body mass index is 24.25 kg/m  as calculated from the following:    Height as of 8/27/21: 1.664 m (5' 5.5\").    Weight as of 11/8/21: 67.1 kg (148 lb).  Physical Exam  GENERAL APPEARANCE: healthy, alert and no distress  EYES: Eyes grossly normal to inspection, PERRL and conjunctivae and sclerae normal  HENT: grossly normal.   NECK: no adenopathy, no asymmetry, masses, or scars and thyroid normal to palpation  RESP: lungs clear to auscultation - no rales, rhonchi or wheezes  BREAST: normal without masses, tenderness or nipple discharge and no palpable axillary masses or adenopathy  CV: regular rate and rhythm, normal S1 S2, no S3 or S4, no murmur, click or rub, no peripheral edema and peripheral pulses strong  ABDOMEN: soft, nontender, no hepatosplenomegaly, no masses and bowel sounds normal   (female): normal female external genitalia, normal urethral meatus, vaginal mucosal atrophy noted, normal cervix, adnexae, and uterus without masses or abnormal discharge. KANWAL unremarkable, sensitive around external anal sphincter.   MS: no musculoskeletal defects are noted and gait is age appropriate without ataxia  SKIN: no suspicious lesions or rashes  NEURO: Normal strength and tone, sensory exam grossly normal, mentation intact and speech normal  PSYCH: mentation appears normal and affect normal/bright    Diagnostic Test Results:  Labs reviewed in Epic    ASSESSMENT / PLAN:   Francisca was seen today for wellness visit, sinus problem, rectal pain and dizziness.    Diagnoses and all orders for this visit:    Encounter for general adult medical examination with abnormal " findings  -     CBC with platelets; Future  -     CBC with platelets    Type 2 diabetes mellitus without complication, without long-term current use of insulin (H)  Has never had diabetes.  Will resolve this from problem list.  -     Hemoglobin A1c; Future  -     Hemoglobin A1c    Celiac disease  Irritable bowel syndrome with constipation  Chronic idiopathic constipation  Diverticular disease of large intestine  Polyp of colon, unspecified part of colon, unspecified type  Has been seeing GI.  Up-to-date with colonoscopy.  Takes MiraLAX.  Does not want to take the hyoscyamine prescribed by GI for terminal pain as she has been asymptomatic.    Hyperlipidemia, unspecified hyperlipidemia type  -     Lipid panel; Future  -     Lipid panel    Vitamin D deficiency  -     Vitamin D Deficiency; Future  -     Vitamin D Deficiency    Osteoporosis, unspecified osteoporosis type, unspecified pathological fracture presence  Needs treatment per bone density in January 2021 however has not been able to tolerate Fosamax or Reclast.  Will send to endocrine for further evaluation and treatment.  -     Comprehensive metabolic panel (BMP + Alb, Alk Phos, ALT, AST, Total. Bili, TP); Future  -     Parathyroid Hormone Intact; Future  -     Adult Endocrinology Referral; Future  -     Comprehensive metabolic panel (BMP + Alb, Alk Phos, ALT, AST, Total. Bili, TP)  -     Parathyroid Hormone Intact    Rectal spasm  Anal or rectal pain  Proctalgia fugax  Unremarkable rectal exam. Anoscopy was not done today. Will trial diltiazem, sitz bath if not better will send to colorectal. Saw GI a couple weeks ago and was told she had spasm and can try hyoscyamine but she prefers not to take oral.   -     diltiazem 2% in PLO gel; Apply pea sized amount 2 times daily as needed    Nasal congestion  Sinus headache  Plugged feeling in ear, bilateral  Recent CT sinuses from urgent care unremarkable. Will send to ENT.   -     Otolaryngology Referral;  "Future    Benign paroxysmal positional vertigo, unspecified laterality  Dizziness  Chronic and intermittent. Will send to vestibular therapy.   -     Physical Therapy Referral; Future    B12 deficiency  -     Vitamin B12; Future  -     Vitamin B12    Other orders  -     REVIEW OF HEALTH MAINTENANCE PROTOCOL ORDERS        Patient has been advised of split billing requirements and indicates understanding: Yes  COUNSELING:  Reviewed preventive health counseling, as reflected in patient instructions       Regular exercise       Healthy diet/nutrition       Osteoporosis prevention/bone health    Estimated body mass index is 24.25 kg/m  as calculated from the following:    Height as of 8/27/21: 1.664 m (5' 5.5\").    Weight as of 11/8/21: 67.1 kg (148 lb).        She reports that she has never smoked. She has never used smokeless tobacco.      Appropriate preventive services were discussed with this patient, including applicable screening as appropriate for cardiovascular disease, diabetes, osteopenia/osteoporosis, and glaucoma.  As appropriate for age/gender, discussed screening for colorectal cancer, prostate cancer, breast cancer, and cervical cancer. Checklist reviewing preventive services available has been given to the patient.    Reviewed patients plan of care and provided an AVS. The Basic Care Plan (routine screening as documented in Health Maintenance) for Francisca meets the Care Plan requirement. This Care Plan has been established and reviewed with the Patient.    Counseling Resources:  ATP IV Guidelines  Pooled Cohorts Equation Calculator  Breast Cancer Risk Calculator  Breast Cancer: Medication to Reduce Risk  FRAX Risk Assessment  ICSI Preventive Guidelines  Dietary Guidelines for Americans, 2010  [employer]'s MyPlate  ASA Prophylaxis  Lung CA Screening    Dorota Horne MD  [facility]    Identified Health Risks:    The patient was provided with suggestions to help her develop a " healthy physical lifestyle.

## 2021-12-13 NOTE — PATIENT INSTRUCTIONS
Patient Education   Personalized Prevention Plan  You are due for the preventive services outlined below.  Your care team is available to assist you in scheduling these services.  If you have already completed any of these items, please share that information with your care team to update in your medical record.  Health Maintenance Due   Topic Date Due     Kidney Microalbumin Urine Test  Never done     Diabetic Foot Exam  Never done     ANNUAL REVIEW OF HM ORDERS  Never done     Eye Exam  Never done     Zoster (Shingles) Vaccine (1 of 2) Never done     A1C Lab  09/21/2021     Cholesterol Lab  10/06/2021     Preventive Health Recommendations    See your health care provider every year to    Review health changes.     Discuss preventive care.      Review your medicines if your doctor has prescribed any.    You no longer need a yearly Pap test unless you've had an abnormal Pap test in the past 10 years. If you have vaginal symptoms, such as bleeding or discharge, be sure to talk with your provider about a Pap test.    Every 1 to 2 years, have a mammogram.  If you are over 69, talk with your health care provider about whether or not you want to continue having screening mammograms.    Every 10 years, have a colonoscopy. Or, have a yearly FIT test (stool test). These exams will check for colon cancer.     Have a cholesterol test every 5 years, or more often if your doctor advises it.     Have a diabetes test (fasting glucose) every three years. If you are at risk for diabetes, you should have this test more often.     At age 65, have a bone density scan (DEXA) to check for osteoporosis (brittle bone disease).    Shots:    Get a flu shot each year.    Get a tetanus shot every 10 years.    Talk to your doctor about your pneumonia vaccines. There are now two you should receive - Pneumovax (PPSV 23) and Prevnar (PCV 13).    Talk to your pharmacist about the shingles vaccine.    Talk to your doctor about the hepatitis B  vaccine.    Nutrition:     Eat at least 5 servings of fruits and vegetables each day.    Eat whole-grain bread, whole-wheat pasta and brown rice instead of white grains and rice.    Get adequate Calcium and Vitamin D.     Lifestyle    Exercise at least 150 minutes a week (30 minutes a day, 5 days a week). This will help you control your weight and prevent disease.    Limit alcohol to one drink per day.    No smoking.     Wear sunscreen to prevent skin cancer.     See your dentist twice a year for an exam and cleaning.    See your eye doctor every 1 to 2 years to screen for conditions such as glaucoma, macular degeneration and cataracts.    Personalized Prevention Plan  You are due for the preventive services outlined below.  Your care team is available to assist you in scheduling these services.  If you have already completed any of these items, please share that information with your care team to update in your medical record.  Health Maintenance   Topic Date Due     MICROALBUMIN  Never done     DIABETIC FOOT EXAM  Never done     ANNUAL REVIEW OF HM ORDERS  Never done     EYE EXAM  Never done     ZOSTER IMMUNIZATION (1 of 2) Never done     A1C  09/21/2021     LIPID  10/06/2021     BMP  10/04/2022     FALL RISK ASSESSMENT  10/04/2022     MEDICARE ANNUAL WELLNESS VISIT  12/13/2022     DTAP/TDAP/TD IMMUNIZATION (3 - Td or Tdap) 06/10/2023     MAMMO SCREENING  08/11/2023     Pneumococcal Vaccine: 65+ Years (1 of 1 - PPSV23) 10/06/2025     COLORECTAL CANCER SCREENING  02/17/2026     ADVANCE CARE PLANNING  12/13/2026     DEXA  01/26/2036     PHQ-2  Completed     INFLUENZA VACCINE  Completed     COVID-19 Vaccine  Completed     IPV IMMUNIZATION  Aged Out     MENINGITIS IMMUNIZATION  Aged Out     HEPATITIS C SCREENING  Discontinued     Your Health Risk Assessment indicates you feel you are not in good health    A healthy lifestyle helps keep the body fit and the mind alert. It helps protect you from disease, helps you fight  disease, and helps prevent chronic disease (disease that doesn't go away) from getting worse. This is important as you get older and begin to notice twinges in muscles and joints and a decline in the strength and stamina you once took for granted. A healthy lifestyle includes good healthcare, good nutrition, weight control, recreation, and regular exercise. Avoid harmful substances and do what you can to keep safe. Another part of a healthy lifestyle is stay mentally active and socially involved.    Good healthcare     Have a wellness visit every year.     If you have new symptoms, let us know right away. Don't wait until the next checkup.     Take medicines exactly as prescribed and keep your medicines in a safe place. Tell us if your medicine causes problems.   Healthy diet and weight control     Eat 3 or 4 small, nutritious, low-fat, high-fiber meals a day. Include a variety of fruits, vegetables, and whole-grain foods.     Make sure you get enough calcium in your diet. Calcium, vitamin D, and exercise help prevent osteoporosis (bone thinning).     If you live alone, try eating with others when you can. That way you get a good meal and have company while you eat it.     Try to keep a healthy weight. If you eat more calories than your body uses for energy, it will be stored as fat and you will gain weight.     Recreation   Recreation is not limited to sports and team events. It includes any activity that provides relaxation, interest, enjoyment, and exercise. Recreation provides an outlet for physical, mental, and social energy. It can give a sense of worth and achievement. It can help you stay healthy.    Mental Exercise and Social Involvement  Mental and emotional health is as important as physical health. Keep in touch with friends and family. Stay as active as possible. Continue to learn and challenge yourself.   Things you can do to stay mentally active are:    Learn something new, like a foreign language or  musical instrument.     Play SCRABBLE or do crossword puzzles. If you cannot find people to play these games with you at home, you can play them with others on your computer through the Internet.     Join a games club--anything from card games to chess or checkers or lawn bowling.     Start a new hobby.     Go back to school.     Volunteer.     Read.   Keep up with world events.

## 2021-12-14 LAB
DEPRECATED CALCIDIOL+CALCIFEROL SERPL-MC: 48 UG/L (ref 30–80)
VIT B12 SERPL-MCNC: 483 PG/ML (ref 213–816)

## 2021-12-28 DIAGNOSIS — M62.89 PELVIC FLOOR DYSFUNCTION IN FEMALE: ICD-10-CM

## 2021-12-30 ENCOUNTER — MYC MEDICAL ADVICE (OUTPATIENT)
Dept: FAMILY MEDICINE | Facility: CLINIC | Age: 66
End: 2021-12-30
Payer: MEDICARE

## 2021-12-30 DIAGNOSIS — K59.4 RECTAL SPASM: ICD-10-CM

## 2021-12-30 DIAGNOSIS — K59.4 PROCTALGIA FUGAX: ICD-10-CM

## 2021-12-30 DIAGNOSIS — K62.89 ANAL OR RECTAL PAIN: ICD-10-CM

## 2021-12-30 DIAGNOSIS — M62.89 PELVIC FLOOR DYSFUNCTION IN FEMALE: ICD-10-CM

## 2021-12-30 RX ORDER — ESTRADIOL 0.1 MG/G
CREAM VAGINAL
Qty: 42.5 G | Refills: 1 | Status: SHIPPED | OUTPATIENT
Start: 2021-12-30 | End: 2021-12-31

## 2021-12-31 RX ORDER — ESTRADIOL 0.1 MG/G
CREAM VAGINAL
Qty: 42.5 G | Refills: 3 | Status: SHIPPED | OUTPATIENT
Start: 2021-12-31 | End: 2022-07-07

## 2022-02-09 ENCOUNTER — OFFICE VISIT (OUTPATIENT)
Dept: AUDIOLOGY | Facility: CLINIC | Age: 67
End: 2022-02-09
Payer: COMMERCIAL

## 2022-02-09 ENCOUNTER — OFFICE VISIT (OUTPATIENT)
Dept: OTOLARYNGOLOGY | Facility: CLINIC | Age: 67
End: 2022-02-09
Payer: COMMERCIAL

## 2022-02-09 DIAGNOSIS — R09.81 NASAL CONGESTION: ICD-10-CM

## 2022-02-09 DIAGNOSIS — G50.1 ATYPICAL FACIAL PAIN: Primary | ICD-10-CM

## 2022-02-09 DIAGNOSIS — H90.3 SENSORINEURAL HEARING LOSS, BILATERAL: Primary | ICD-10-CM

## 2022-02-09 PROCEDURE — 99203 OFFICE O/P NEW LOW 30 MIN: CPT | Performed by: OTOLARYNGOLOGY

## 2022-02-09 PROCEDURE — 92557 COMPREHENSIVE HEARING TEST: CPT | Performed by: AUDIOLOGIST

## 2022-02-09 PROCEDURE — 99207 PR NO CHARGE LOS: CPT | Performed by: AUDIOLOGIST

## 2022-02-09 PROCEDURE — 92567 TYMPANOMETRY: CPT | Performed by: AUDIOLOGIST

## 2022-02-09 NOTE — PROGRESS NOTES
HPI: This patient is a 65yo F who presents for evaluation of the sinuses at the request of Dr. Horne. The patient reports chronic sinus infections for the past few months. She will have head/facial pressure, ear pressure, and headaches. There have not really been episodes of high fever, localized sinus pain, tooth pain, and pururlent drainage. Describing only a small degree of nasal congestion or breathing restriction that also comes and goes. Has been on antibiotics and nasal steroids in the past without much improvement in the overall situation. Denies dental grinding/clenching. Has been having neck muscular pain also, particularly trap muscles, etc.    Past medical history, surgical history, social history, family history, medications, and allergies have been reviewed with the patient and are documented above.    Review of Systems: a 10-system review was performed. Pertinent positives are noted in the HPI and on a separate scanned document in the chart.    PHYSICAL EXAMINATION:  GEN: no acute distress, normocephalic  EYES: extraocular movements are intact, pupils are equal and round. Sclera clear.   EARS: auricles are normally formed. The external auditory canals are clear with minimal to no cerumen. Tympanic membranes are intact bilaterally with no signs of infection, effusion, retractions, or perforations.  NOSE: anterior nares are patent. There are no masses or lesions. The septum is non-obstructing. No percussive tenderness over the maxillary or frontal sinuses.   OC/OP: clear, dentition is in good repair but with wear that is consistent with clenching/grinding. The tongue and palate are fully mobile and symmetric. No masses or lesions.  NECK: soft and supple. No lymphadenopathy or masses. Airway is midline. +bilateral TMJ pain on palpation.  NEURO: CN VII and XII symmetric. alert and oriented. No spontaneous nystagmus. Gait is normal.  PULM: breathing comfortably on room air, normal chest expansion with  respiration  CARDS: no cyanosis or clubbing. Normal carotid pulses.    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 Discussed soft diet, NSAIDS, and a bite guard. PDR clinic referral for further management of TMJ and neck muscular tension.

## 2022-02-09 NOTE — PATIENT INSTRUCTIONS
Patient Education     TMJ Syndrome  The temporomandibular joint (TMJ) is the joint that connects your lower jaw to your skull. You can feel it in front of your ears when you open and close your mouth. TMJ disorders cause chronic or recurrent pain and problems in the jaw joint and the muscles that control jaw movement. Symptoms of TMJ disorders are usually relieved with minor treatments. But symptoms may come back, especially in times of stress.   Causes  There is no widely agreed-on cause of TMJ disorders. They have been linked to injury, arthritis, tooth and jaw alignment, chronic fatigue syndrome, and fibromyalgia. A definite connection has not been shown to these, though.   Symptoms    Pain in the face, jaw, or neck    Pain with jaw movement or chewing    Locking or catching sensation of the jaw    Clicking, popping, or grinding sounds with movement of the TMJ    Headache    Ear pain    Home care  Modest treatments are a good first step toward relieving symptoms. Try these methods.     Reduce stress on your jaw by not eating crunchy or hard-to-chew foods. Don t eat hard or sticky candies. Soft foods and liquids are easier on the jaw.    Protect your jaw while yawning. If you need to yawn, put your fist under your chin to prevent your mouth from opening too wide.    To help relieve pain, put hot or cold packs on the area that hurts. Try both hot and cold to find out which works best for you. To make a cold pack, put ice cubes in a plastic bag that seals at the top. Wrap the bag in a clean, thin towel or cloth. Never put ice or an ice pack directly on the skin. If you use hot packs (small towels soaked in hot water), be careful not to burn yourself.    You may take acetaminophen or ibuprofen for pain, unless you were given a different pain medicine. (Note: If you have chronic liver or kidney disease or have ever had a stomach ulcer or gastrointestinal bleeding, talk with your healthcare provider before using these  medicines. Also talk to your provider if you are taking medicine to prevent blood clots.) Don t give aspirin to a child younger than age 19 unless directed by the child s provider. Taking aspirin can put a child at risk for Reye syndrome. This is a rare but very serious disorder that most often affects the brain and the liver.  Reducing stress  If stress seems to be contributing to your symptoms, try to find the sources of stress in your life. These aren t always obvious. Common stressors include:     Everyday hassles. These include things such as traffic jams, missed appointments, or car trouble.    Major life changes. These can be good, such as a new baby or job promotion. Or they can be bad, such as losing a job or losing a loved one.    Overload. This is the feeling that you have too many responsibilities and can't take care of everything at once.    Helplessness. This is when you feel like your problems are more than you can solve.  When possible, try to make changes in your sources of stress. See if you can avoid hassles, limit the amount of change in your life at one time, and take breaks when you feel overloaded.   Many stressful situations can't be avoided. So learning how to manage stress is very important. To make everyday stress more manageable:     Getting regular exercise.    Eat nutritious, balanced meals.    Get enough rest.    Try relaxation and breathing exercises, visualization, biofeedback, or meditation.    Take some time out to clear your mind.  For more information, talk with your healthcare provider.  Follow-up care  Follow up with your healthcare provider, or as advised. More testing and other treatment may be needed. If changes to your lifestyle do not improve your symptoms, talk with your healthcare provider about other treatments. These include bite guards for help with teeth grinding, stress management methods, and more. If stress is an important factor and does not respond to the above  lifestyle changes, talk with your healthcare provider about a referral for stress management.   If X-rays were done, they will be reviewed by a specialist. You will be told the results and if they affect your treatment.   Call 911  Call 911 if you have any of these:     Trouble breathing or swallowing    Wheezing    Confusion    Extreme drowsiness or trouble awakening    Fainting or loss of consciousness    Fast heart rate  When to seek medical advice  Call your healthcare provider right away if you have any of these:     Swollen or red face    Jaw or face pain that gets worse    Neck, mouth, tooth, or throat pain that gets worse    Fever of 100.4 F (38 C) or higher, or as directed by your healthcare provider  [employer] last reviewed this educational content on 8/1/2020 2000-2021 [employer]. All rights reserved. This information is not intended as a substitute for professional medical care. Always follow your healthcare professional's instructions.    Now that we've ruled out ENT conditions, the [facility] step [facility] transmandibular joint disorder (TMD or TMJ) and  orofacial pain are treated.  We'll put in a referral for you, but you can certainly call [phone] to schedule at your convenience. Thanks for allowing us the opportunity to get you the care you need!

## 2022-02-09 NOTE — PROGRESS NOTES
AUDIOLOGY REPORT    SUMMARY: Audiology visit completed. See audiogram for results.     RECOMMENDATIONS: Follow-up with ENT.    Chela Breen, [facility]  [place] Licensed Audiologist #[id number]

## 2022-02-09 NOTE — LETTER
2/9/2022         RE: Francisca Williamson  [address]  [place] [address]        Dear Colleague,    Thank you for referring your patient, Francisca Williamson, to the [facility]. Please see a copy of my visit note below.    HPI: This patient is a 65yo F who presents for evaluation of the sinuses at the request of Dr. Horne. The patient reports chronic sinus infections for the past few months. She will have head/facial pressure, ear pressure, and headaches. There have not really been episodes of high fever, localized sinus pain, tooth pain, and pururlent drainage. Describing only a small degree of nasal congestion or breathing restriction that also comes and goes. Has been on antibiotics and nasal steroids in the past without much improvement in the overall situation. Denies dental grinding/clenching. Has been having neck muscular pain also, particularly trap muscles, etc.    Past medical history, surgical history, social history, family history, medications, and allergies have been reviewed with the patient and are documented above.    Review of Systems: a 10-system review was performed. Pertinent positives are noted in the HPI and on a separate scanned document in the chart.    PHYSICAL EXAMINATION:  GEN: no acute distress, normocephalic  EYES: extraocular movements are intact, pupils are equal and round. Sclera clear.   EARS: auricles are normally formed. The external auditory canals are clear with minimal to no cerumen. Tympanic membranes are intact bilaterally with no signs of infection, effusion, retractions, or perforations.  NOSE: anterior nares are patent. There are no masses or lesions. The septum is non-obstructing. No percussive tenderness over the maxillary or frontal sinuses.   OC/OP: clear, dentition is in good repair but with wear that is consistent with clenching/grinding. The tongue and palate are fully mobile and symmetric. No masses or lesions.  NECK: soft and supple. No  lymphadenopathy or masses. Airway is midline. +bilateral TMJ pain on palpation.  NEURO: CN VII and XII symmetric. alert and oriented. No spontaneous nystagmus. Gait is normal.  PULM: breathing comfortably on room air, normal chest expansion with respiration  CARDS: no cyanosis or clubbing. Normal carotid pulses.    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 Discussed soft diet, NSAIDS, and a bite guard. [facility] referral for further management of TMJ and neck muscular tension.          Again, thank you for allowing me to participate in the care of your patient.        Sincerely,        Stephanie Hernandez MD

## 2022-03-02 ENCOUNTER — HOSPITAL ENCOUNTER (OUTPATIENT)
Dept: PHYSICAL THERAPY | Facility: REHABILITATION | Age: 67
End: 2022-03-02
Attending: FAMILY MEDICINE
Payer: COMMERCIAL

## 2022-03-02 DIAGNOSIS — H81.10 BENIGN PAROXYSMAL POSITIONAL VERTIGO, UNSPECIFIED LATERALITY: ICD-10-CM

## 2022-03-02 DIAGNOSIS — R42 DIZZINESS: ICD-10-CM

## 2022-03-02 PROCEDURE — 97112 NEUROMUSCULAR REEDUCATION: CPT | Mod: GP | Performed by: PHYSICAL THERAPIST

## 2022-03-02 PROCEDURE — 97161 PT EVAL LOW COMPLEX 20 MIN: CPT | Mod: GP | Performed by: PHYSICAL THERAPIST

## 2022-03-02 NOTE — PROGRESS NOTES
[facility]                                                                                   OUTPATIENT PHYSICAL THERAPY FUNCTIONAL EVALUATION  PLAN OF TREATMENT FOR OUTPATIENT REHABILITATION  (COMPLETE FOR INITIAL CLAIMS ONLY)  Patient's Last Name, First Name, M.I.  YOB: 1955  Francisca Williamson     Provider's Name   [facility]   Medical Record No.  [id number]     Start of Care Date:  03/02/22   Onset Date:  01/01/15   Type:     _X__PT   ____OT  ____SLP Medical Diagnosis:  Benign paroxysmal positional vertigo, unspecified laterality, dizziness        PT Diagnosis:  Dizziness Visits from SOC:  1                              __________________________________________________________________________________  Plan of Treatment/Functional Goals:  neuromuscular re-education, other (see comments)  Home management        GOALS                                                                                          Therapy Frequency:  other (see comments)   Predicted Duration of Therapy Intervention:  1 visit only    Paola Camarena, PT                                    I CERTIFY THE NEED FOR THESE SERVICES FURNISHED UNDER        THIS PLAN OF TREATMENT AND WHILE UNDER MY CARE     (Physician co-signature of this document indicates review and certification of the therapy plan).                Certification Date From:  03/02/22   Certification Date To:  05/31/22    Referring Provider:  Dorota Horne     Initial Assessment  See Epic Evaluation- Start of Care Date: 03/02/22 03/02/22 1400   Quick Adds   Quick Adds Certification;Vestibular Eval   General Information   Start of Care Date 03/02/22   Referring Physician Dorota Horne    Orders Evaluate and Treat as Indicated   Order Date 12/13/21   Medical Diagnosis Benign paroxysmal positional vertigo, unspecified  laterality, dizziness      Onset of illness/injury or Date of Surgery 01/01/15   Precautions/Limitations no known precautions/limitations   Pertinent history of current problem (include personal factors and/or comorbidities that impact the POC) Pt reports intermittent dizziness; years ago she got up from lying down and her head was swimming and she got nauseated. Was seen by urgent care at that time and everything was fine and she was told she had vertigo. She was given medication at that time which helped minimally. The dizziness went away on its own. It will occasionally come back and her last episode was mid-December 2021 while doing exercises for IBS - lying flat on her back for some time and when trying to get up she had dizziness. She will roll to her side to help it calm down, sit for a long time and wait. The dizziness lasts for 2-3 mins at the most. She gets really nauseated with it. She denies unsteadiness or balance issues.    Patient/Family Goals Statement Learn how to minimize symptoms if they occur   Fall Risk Screen   Fall screen completed by PT   Have you fallen 2 or more times in the past year? No   Have you fallen and had an injury in the past year? No   Is patient a fall risk? No   Abuse Screen (yes response referral indicated)   Feels Unsafe at Home or Work/School no   Feels Threatened by Someone no   Does Anyone Try to Keep You From Having Contact with Others or Doing Things Outside Your Home? no   Physical Signs of Abuse Present no   System Outcome Measures   Outcome Measures BPPV   Dizziness Handicap Inventory (score out of 100) A decrease in score by 17.18 or greater indicates a clinically significant change in symptoms. 20   Pain   Patient currently in pain Yes   Pain location B jaw   Pain rating 3/10   Transfer Skills   Transfer Comments Indep    Balance   Balance Comments tandem walking 5 steps   Balance Special Tests   Balance Special Tests Modified CTSIB Conditions   Balance Special  Tests Modified CTSIB Conditions   Condition 1, seconds 30 Seconds   Condition 2, seconds 30 Seconds   Condition 4, seconds 30 Seconds   Condition 5, seconds 6 Seconds   Cervicogenic Screen   Neck ROM WNL into flx, ext, R and L rotation with mild nausea, some stiffness   Oculomotor Exam   Smooth Pursuit Normal   Saccades Normal   Saccades Comments caused nausea   VOR Abnormal   VOR Comments slow pace, caused nausea   Rapid Head Thrust Normal   Infrared Goggle Exam or Frenzel Lense Exam   Vestibular Suppressant in Last 24 Hours? No   Exam completed with Room Light   Spontaneous Nystagmus Negative   Gaze Evoked Nystagmus Negative   Planned Therapy Interventions   Planned Therapy Interventions neuromuscular re-education;other (see comments)   Planned Therapy Interventions Comment Home management   Clinical Impression   Criteria for Skilled Therapeutic Interventions Met yes, treatment indicated   PT Diagnosis Dizziness   Influenced by the following impairments dizziness   Functional limitations due to impairments dizziness with functional mobility    Clinical Presentation Stable/Uncomplicated   Clinical Decision Making (Complexity) Low complexity   Therapy Frequency other (see comments)   Predicted Duration of Therapy Intervention (days/wks) 1 visit only   Risk & Benefits of therapy have been explained Yes   Patient, Family & other staff in agreement with plan of care Yes   Clinical Impression Comments Pt is a 66 year-old woman with chief complaint dizziness. Her subjective history aligns well with BPPV in the past, but currently pt is not having any room-spinning dizziness. With general vestibular testing today, pt did demonstrate mild symptoms of nausea with certain movements, so she was provided with HEP for vestibular adaptation and educated on the vestibular system and how to test her self for vertigo and perform epley in the future if symptoms return.    Total Evaluation Time   PT Tad Low Complexity Minutes  (44333) 22   Therapy Certification   Certification date from 03/02/22   Certification date to 05/31/22   Medical Diagnosis Benign paroxysmal positional vertigo, unspecified laterality, dizziness      Certification I certify the need for these services furnished under this plan of treatment and while under my care.  (Physician co-signature of this document indicates review and certification of the therapy plan).     Paola Camarena, PT, DPT, CLT  3/2/2022

## 2022-03-03 ENCOUNTER — TRANSFERRED RECORDS (OUTPATIENT)
Dept: HEALTH INFORMATION MANAGEMENT | Facility: CLINIC | Age: 67
End: 2022-03-03
Payer: COMMERCIAL

## 2022-03-08 ASSESSMENT — ENCOUNTER SYMPTOMS
ABDOMINAL PAIN: 1
MUSCLE CRAMPS: 0
HEARTBURN: 1
VOMITING: 0
TROUBLE SWALLOWING: 0
TASTE DISTURBANCE: 0
JAUNDICE: 0
NECK MASS: 0
MYALGIAS: 1
BLOOD IN STOOL: 0
BOWEL INCONTINENCE: 0
RECTAL PAIN: 1
CONSTIPATION: 1
SMELL DISTURBANCE: 0
BACK PAIN: 1
HOARSE VOICE: 0
STIFFNESS: 1
ARTHRALGIAS: 1
SINUS CONGESTION: 0
MUSCLE WEAKNESS: 0
DIARRHEA: 0
SINUS PAIN: 0
NECK PAIN: 1
NAUSEA: 1
JOINT SWELLING: 0
SORE THROAT: 0
BLOATING: 0

## 2022-03-09 ENCOUNTER — LAB (OUTPATIENT)
Dept: LAB | Facility: CLINIC | Age: 67
End: 2022-03-09
Payer: COMMERCIAL

## 2022-03-09 ENCOUNTER — OFFICE VISIT (OUTPATIENT)
Dept: ENDOCRINOLOGY | Facility: CLINIC | Age: 67
End: 2022-03-09
Attending: FAMILY MEDICINE
Payer: COMMERCIAL

## 2022-03-09 VITALS
HEART RATE: 76 BPM | BODY MASS INDEX: 25.56 KG/M2 | HEIGHT: 64 IN | DIASTOLIC BLOOD PRESSURE: 60 MMHG | SYSTOLIC BLOOD PRESSURE: 104 MMHG | WEIGHT: 149.7 LBS

## 2022-03-09 DIAGNOSIS — E78.5 HYPERLIPIDEMIA, UNSPECIFIED HYPERLIPIDEMIA TYPE: ICD-10-CM

## 2022-03-09 DIAGNOSIS — M81.0 OSTEOPOROSIS, UNSPECIFIED OSTEOPOROSIS TYPE, UNSPECIFIED PATHOLOGICAL FRACTURE PRESENCE: Primary | ICD-10-CM

## 2022-03-09 DIAGNOSIS — E11.9 TYPE 2 DIABETES MELLITUS WITHOUT COMPLICATION, WITHOUT LONG-TERM CURRENT USE OF INSULIN (H): Primary | ICD-10-CM

## 2022-03-09 DIAGNOSIS — M81.0 OSTEOPOROSIS, UNSPECIFIED OSTEOPOROSIS TYPE, UNSPECIFIED PATHOLOGICAL FRACTURE PRESENCE: ICD-10-CM

## 2022-03-09 LAB
ALBUMIN SERPL-MCNC: 4.4 G/DL (ref 3.5–5)
ALP SERPL-CCNC: 78 U/L (ref 45–120)
ALT SERPL W P-5'-P-CCNC: 16 U/L (ref 0–45)
ANION GAP SERPL CALCULATED.3IONS-SCNC: 12 MMOL/L (ref 5–18)
AST SERPL W P-5'-P-CCNC: 23 U/L (ref 0–40)
BILIRUB SERPL-MCNC: 0.3 MG/DL (ref 0–1)
BUN SERPL-MCNC: 14 MG/DL (ref 8–22)
CALCIUM SERPL-MCNC: 9.8 MG/DL (ref 8.5–10.5)
CHLORIDE BLD-SCNC: 103 MMOL/L (ref 98–107)
CO2 SERPL-SCNC: 24 MMOL/L (ref 22–31)
CREAT SERPL-MCNC: 0.86 MG/DL (ref 0.6–1.1)
GFR SERPL CREATININE-BSD FRML MDRD: 73 ML/MIN/1.73M2
GLUCOSE BLD-MCNC: 117 MG/DL (ref 70–125)
PHOSPHATE SERPL-MCNC: 3.6 MG/DL (ref 2.5–4.5)
POTASSIUM BLD-SCNC: 4.2 MMOL/L (ref 3.5–5)
PROT SERPL-MCNC: 7.5 G/DL (ref 6–8)
PTH-INTACT SERPL-MCNC: 78 PG/ML (ref 10–86)
SODIUM SERPL-SCNC: 139 MMOL/L (ref 136–145)
TOTAL PROTEIN SERUM FOR ELP: 7.5 G/DL (ref 6–8)
TSH SERPL DL<=0.005 MIU/L-ACNC: 2.94 UIU/ML (ref 0.3–5)

## 2022-03-09 PROCEDURE — 82306 VITAMIN D 25 HYDROXY: CPT

## 2022-03-09 PROCEDURE — 36415 COLL VENOUS BLD VENIPUNCTURE: CPT

## 2022-03-09 PROCEDURE — 82043 UR ALBUMIN QUANTITATIVE: CPT

## 2022-03-09 PROCEDURE — 83970 ASSAY OF PARATHORMONE: CPT

## 2022-03-09 PROCEDURE — 84166 PROTEIN E-PHORESIS/URINE/CSF: CPT | Performed by: PATHOLOGY

## 2022-03-09 PROCEDURE — 84100 ASSAY OF PHOSPHORUS: CPT

## 2022-03-09 PROCEDURE — 84443 ASSAY THYROID STIM HORMONE: CPT

## 2022-03-09 PROCEDURE — 84165 PROTEIN E-PHORESIS SERUM: CPT | Performed by: PATHOLOGY

## 2022-03-09 PROCEDURE — 99205 OFFICE O/P NEW HI 60 MIN: CPT | Performed by: INTERNAL MEDICINE

## 2022-03-09 PROCEDURE — 80053 COMPREHEN METABOLIC PANEL: CPT

## 2022-03-09 RX ORDER — ACETAMINOPHEN 500 MG
1 TABLET ORAL DAILY
COMMUNITY
End: 2022-03-09 | Stop reason: ALTCHOICE

## 2022-03-09 NOTE — PATIENT INSTRUCTIONS
-Aim for 1200 mg of calcium from diet  -Continue vitamin D without changes (we will adjust as needed based on results)  -Labs today  -Schedule spine x-rays  -Perform 24 hour studies  -Return for a follow-up visit in 2023, with DXA and labs before visit (if needed, we will coordinate closer follow-up)  -We will communicate results via [employer], or if needed by phone    Patient Education     Preventing Osteoporosis: Meeting Your Calcium Needs    Your body needs calcium to build and repair bones. But it can't make calcium on its own. That's why it's important to eat calcium-rich foods. Some foods are naturally rich in calcium. Others have calcium added (fortified). It's best to get calcium from the foods you eat. But if you can't get enough, you may want to take calcium supplements. To meet your daily calcium needs, try the foods listed below.  Dairy Fish & beans Other sources   Source   Calcium (mg) per serving   Source   Calcium (mg) per serving   Source   Calcium (mg) per serving   Low-fat yogurt, plain   415 mg/8 oz.   Sardines, Atlantic, canned, with bones   351 mg/3 oz.   Oatmeal, instant, fortified   215 mg/1 cup   Nonfat milk   302 mg/1 cup   [place], sockeye, canned, with bones   239 mg/3 oz.   Tofu made with calcium sulfate   204 mg/3 oz.   Low-fat milk   297 mg/1 cup   Soybeans, fresh, boiled   131 mg/1/2 cup   Collards   179 mg/1/2 cup   Swiss cheese   272 mg/1 oz.   White beans, cooked   81 mg/1/2 cup   English muffin, whole wheat   175 mg/1 muffin   Cheddar cheese   205 mg/1 oz.   Navy beans, cooked   79 mg/1/2 cup   Kale   90 mg/1/2 cup   Ice cream strawberry   79 mg/1/2 cup           Orange, navel   56 mg/1 medium   Note: Calcium levels may vary depending on brand and size.  Daily calcium needs  14 to 18 years old: 1,300 mg  19 to 30 years old: 1,000 mg  31 to 50 years old: 1,000 mg  51 to 70 years old, women: 1,200 mg  51 to 70 years old, men: 1,000 mg  Pregnant or nursin to 18 years old:  1,300 mg, 19 to 50 years old: 1,000 mg  Older than 70 (women and men): 1,200 mg   [employer] last reviewed this educational content on 5/1/2018 2000-2021 [employer]. All rights reserved. This information is not intended as a substitute for professional medical care. Always follow your healthcare professional's instructions.

## 2022-03-09 NOTE — PROGRESS NOTES
ENDOCRINOLOGY NEW PATIENT VISIT        HISTORY OF PRESENT ILLNESS    Francisca Williamson is seen in consultation at the request of Dorota Horne MD for osteoporosis.    The patient was previously diagnosed with osteoporosis and was initially treated with Fosamax which resulted in heartburn.  Therefore, she was transitioned to Reclast.  Some progress notes indicate she was treated with 3 doses of Reclast.  However, the patient recalls she may have received 2 doses.  Records in epic also indicate that she received a dose in 2014 and 2015: I do not see documentation for dose in 2016.  She tolerated Reclast without side effect.    She has been on no treatment thereafter.    I personally reviewed images of most recent DXA scan completed on 1/26/2021.  -Lumbar spine, L1-L4 T score -2.3, no comparison study.  -Left femoral neck T score -0.9, left total hip T score -1.2.  -Right femoral neck T score -0.8, right total hip T score -1.2.    The patient has no history of fracture.  Endorses half inch height loss in adulthood.  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    - Diet - [place] milk daily, dark leafy greens 2 cps daily, fortified orange juice 8 ounces daily.  - No calcium supplements (has had constipation with calcium supplement), vitamin D3 2000 IU daily.  - No PPI therapy.    No history of hypercalcemia.  No history of nephrolithiasis.    History of celiac disease--follows strict gluten free diet. Also now following a dairy free diet.    No history of long-term glucocorticoid therapy.  Menopause was in early 50s.    No tobacco use.  One glass of wine a day.    Mother had history of osteoporosis.  No parental hip fracture history.    Has regular dentist appointments. Not anticipating invasive dental surgery.    Pertinent Social History: , stepchildren. 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    PAST MEDICAL  "HISTORY  Past Medical History:   Diagnosis Date     B12 deficiency      Celiac disease      Diverticulitis      Diverticulosis      GERD (gastroesophageal reflux disease)     states main problem is heartburn     High cholesterol      Osteoporosis      Shingles 1/1/2005       MEDICATIONS  Current Outpatient Medications   Medication Sig Dispense Refill     B-D TB SYRINGE 25G X 5/8\" 1 ML MISC FOR HOME USE       cyanocobalamin (CYANOCOBALAMIN) 1000 MCG/ML injection Inject 1,000 mcg into the muscle        diltiazem 2% in PLO gel Apply pea sized amount 2 times daily as needed 30 g 0     estradiol (ESTRACE) 0.1 MG/GM vaginal cream PLACE 2 GRAMS VAGINALLY 3 TIMES A WEEK 42.5 g 3     hyoscyamine (LEVSIN) 0.125 MG tablet TAKE 1 TABLET BY MOUTH UP TO FOUR TIMES DAILY AS NEEDED FOR SPASMS       Lactobacillus Rhamnosus, GG, ( PROBIOTIC DIGESTIVE CARE) CAPS Take 1 capsule by mouth       latanoprost (XALATAN) 0.005 % ophthalmic solution INT 1 GTT IN EACH EYE ONCE D       polyethylene glycol (MIRALAX) 17 GM/Dose powder Take 17 g by mouth       pravastatin (PRAVACHOL) 20 MG tablet Take 20 mg by mouth       VITAMIN D PO Take 2,000 Units by mouth       valACYclovir (VALTREX) 500 MG tablet Take 1 tablet (500 mg) by mouth 2 times daily for 3 days 6 tablet 3       Allergies, family, and social history were reviewed and documented as needed in EHR.     REVIEW OF SYSTEMS  A complete 10-point ROS was performed and pertinent positives and negatives are noted in the HPI.     PHYSICAL EXAM  /60 (BP Location: Right arm, Patient Position: Sitting, Cuff Size: Adult Regular)   Pulse 76   Ht 1.636 m (5' 4.41\")   Wt 67.9 kg (149 lb 11.2 oz)   BMI 25.37 kg/m    Body mass index is 25.37 kg/m .  Constitutional: Vital signs reviewed, as recorded above. Patient is alert, oriented and appears in no acute distress.  Eyes: PER, EOMI, no stare, lid lag, or retraction; no conjunctival injection.  Neck: Neck supple, no palpable " thyromegaly.  Lymphatic: No cervical or supraclavicular LAD.  Cardiovascular: RRR, normal S1/S2, no audible murmurs, rubs or gallops, no LE edema.  Respiratory: CTAB, without wheezes, crackles or rhonchi; normal chest wall motion and respiratory effort.  GI: Positive bowel sounds, soft, mild tenderness around the umbilicus, without rebound or guarding.  MSK: No clubbing or cyanosis; normal muscle bulk and tone.  Skin: Normal skin color, temperature, turgor and texture.  Neurological: Alert and oriented times 3. No tremor.    DATA REVIEW  Each of the following laboratory and/or imaging studies were reviewed.    Office Visit on 12/13/2021   Component Date Value Ref Range Status     Hemoglobin A1C 12/13/2021 5.5  0.0 - 5.6 % Final    Normal <5.7%   Prediabetes 5.7-6.4%    Diabetes 6.5% or higher     Note: Adopted from ADA consensus guidelines.     Cholesterol 12/13/2021 162  <=199 mg/dL Final     Triglycerides 12/13/2021 72  <=149 mg/dL Final     Direct Measure HDL 12/13/2021 57  >=50 mg/dL Final    HDL Cholesterol Reference Range:     0-2 years:   No reference ranges established for patients under 2 years old  at [facility] for lipid analytes.    2-8 years:  Greater than 45 mg/dL     18 years and older:   Female: Greater than or equal to 50 mg/dL   Male:   Greater than or equal to 40 mg/dL     LDL Cholesterol Calculated 12/13/2021 91  <=129 mg/dL Final     Patient Fasting > 8hrs? 12/13/2021 Yes   Final     Vitamin D, Total (25-Hydroxy) 12/13/2021 48  30 - 80 ug/L Final     WBC Count 12/13/2021 5.2  4.0 - 11.0 10e3/uL Final     RBC Count 12/13/2021 4.67  3.80 - 5.20 10e6/uL Final     Hemoglobin 12/13/2021 13.6  11.7 - 15.7 g/dL Final     Hematocrit 12/13/2021 43.1  35.0 - 47.0 % Final     MCV 12/13/2021 92  78 - 100 fL Final     MCH 12/13/2021 29.1  26.5 - 33.0 pg Final     MCHC 12/13/2021 31.6  31.5 - 36.5 g/dL Final     RDW 12/13/2021 13.3  10.0 - 15.0 % Final     Platelet Count 12/13/2021 213  150 - 450  10e3/uL Final     Sodium 12/13/2021 140  136 - 145 mmol/L Final     Potassium 12/13/2021 4.1  3.5 - 5.0 mmol/L Final     Chloride 12/13/2021 105  98 - 107 mmol/L Final     Carbon Dioxide (CO2) 12/13/2021 24  22 - 31 mmol/L Final     Anion Gap 12/13/2021 11  5 - 18 mmol/L Final     Urea Nitrogen 12/13/2021 12  8 - 22 mg/dL Final     Creatinine 12/13/2021 0.81  0.60 - 1.10 mg/dL Final     Calcium 12/13/2021 9.7  8.5 - 10.5 mg/dL Final     Glucose 12/13/2021 101  70 - 125 mg/dL Final     Alkaline Phosphatase 12/13/2021 56  45 - 120 U/L Final     AST 12/13/2021 20  0 - 40 U/L Final     ALT 12/13/2021 15  0 - 45 U/L Final     Protein Total 12/13/2021 7.1  6.0 - 8.0 g/dL Final     Albumin 12/13/2021 4.3  3.5 - 5.0 g/dL Final     Bilirubin Total 12/13/2021 0.4  0.0 - 1.0 mg/dL Final     GFR Estimate 12/13/2021 75  >60 mL/min/1.73m2 Final    As of July 11, 2021, eGFR is calculated by the CKD-EPI creatinine equation, without race adjustment. eGFR can be influenced by muscle mass, exercise, and diet. The reported eGFR is an estimation only and is only applicable if the renal function is stable.     Parathyroid Hormone Intact 12/13/2021 86  10 - 86 pg/mL Final     Vitamin B12 12/13/2021 483  213 - 816 pg/mL Final         ASSESSMENT  1.  Osteoporosis.  Without history of low trauma fracture.  Potentially related to postmenopausal status and history of celiac disease. Appears to have optimal calcium intake from diet: I have given patient reading materials and she will continue aiming for sufficient amount of calcium from diet.  Perform 24 urine studies to ensure intake/absorption are optimal.  Would also screen for secondary causes of osteoporosis.  With regards to treatment, she has already received at least 2 doses of Reclast in the past.  Off treatment for at least 6 years.  It would have been ideal to have prior DXA on the same machine to assess for decline in BMD (which would prompt reinitiation of therapy).  However, since  we do not have this data we can anticipate DXA in 1/2023 to reassess BMD; I will refer patient for spine x-rays to screen for an occult fracture (if we discover fracture, this would be an indication to treat sooner).  If no occult fracture, would follow carefully and repeat DXA in 1/2023 as above to guide our decision making with regards to treatment.  We had an extensive discussion on pathophysiology of osteoporosis, optimal calcium intake, screening for secondary causes of osteoporosis as well as thresholds that would indicate initiation of therapy.    2.  Celiac disease.  Following strict gluten-free diet.    PLAN  -Aim for 1200 mg of calcium from diet  -Continue vitamin D without changes (we will adjust as needed based on results)  -Labs today  -Schedule spine x-rays  -Perform 24 hour studies  -We will request records from [facility] to at least have a gross comparator of prior BMD measurements on past DXA scans  -Return for a follow-up visit in January 2023, with DXA and labs before visit (if needed, we will coordinate closer follow-up)  -We will communicate results via morphCARD[facility], or if needed by phone      Orders Placed This Encounter   Procedures     XR Thoracic Spine 2 Views     XR Lumbar Spine 2/3 Views     Vitamin D Deficiency     TSH with free T4 reflex     Sodium timed urine     Protein electrophoresis random urine     Phosphorus     Parathyroid Hormone Intact     Comprehensive metabolic panel     Calcium timed urine with Creat Ratio     Protein electrophoresis     I spent a total of 68 minutes on the date of encounter reviewing medical records, evaluating the patient, coordinating care and documenting in the EHR, as detailed above.      Rajwinder Rucker MD   Division of Diabetes, Endocrinology and Metabolism  Department of Medicine      cc: Dorota Horne MD    Addendum 3/21/22:   Received records of DXA scan completed at [facility] on 12/16/2016.  Osteopenia.  L1-L4 T score -0.7  Left  femoral neck T score -0.6, left total hip T score -1  Right femoral neck T score -0.4, right total hip T score -0.9.  At that time, FRAX 10-year probability of major osteoporotic fracture was 12.8%, hip fracture 0.8%.

## 2022-03-09 NOTE — LETTER
3/9/2022         RE: Francisca Williamson  [address]  [place] [address]        Dear Colleague,    Thank you for referring your patient, Francisca Williamson, to the [facility]. Please see a copy of my visit note below.      ENDOCRINOLOGY NEW PATIENT VISIT        HISTORY OF PRESENT ILLNESS    Francisca Williamson is seen in consultation at the request of Dorota Horne MD for osteoporosis.    The patient was previously diagnosed with osteoporosis and was initially treated with Fosamax which resulted in heartburn.  Therefore, she was transitioned to Reclast.  Some progress notes indicate she was treated with 3 doses of Reclast.  However, the patient recalls she may have received 2 doses.  Records in epic also indicate that she received a dose in 2014 and 2015: I do not see documentation for dose in 2016.  She tolerated Reclast without side effect.    She has been on no treatment thereafter.    I personally reviewed images of most recent DXA scan completed on 1/26/2021.  -Lumbar spine, L1-L4 T score -2.3, no comparison study.  -Left femoral neck T score -0.9, left total hip T score -1.2.  -Right femoral neck T score -0.8, right total hip T score -1.2.    The patient has no history of fracture.  Endorses half inch height loss in adulthood.  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    - Diet - [place] milk daily, dark leafy greens 2 cps daily, fortified orange juice 8 ounces daily.  - No calcium supplements (has had constipation with calcium supplement), vitamin D3 2000 IU daily.  - No PPI therapy.    No history of hypercalcemia.  No history of nephrolithiasis.    History of celiac disease--follows strict gluten free diet. Also now following a dairy free diet.    No history of long-term glucocorticoid therapy.  Menopause was in early 50s.    No tobacco use.  One glass of wine a day.    Mother had history of  "osteoporosis.  No parental hip fracture history.    Has regular dentist appointments. Not anticipating invasive dental surgery.    Pertinent Social History: , stepchildren. 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    PAST MEDICAL HISTORY  Past Medical History:   Diagnosis Date     B12 deficiency      Celiac disease      Diverticulitis      Diverticulosis      GERD (gastroesophageal reflux disease)     states main problem is heartburn     High cholesterol      Osteoporosis      Shingles 1/1/2005       MEDICATIONS  Current Outpatient Medications   Medication Sig Dispense Refill     B-D TB SYRINGE 25G X 5/8\" 1 ML MISC FOR HOME USE       cyanocobalamin (CYANOCOBALAMIN) 1000 MCG/ML injection Inject 1,000 mcg into the muscle        diltiazem 2% in PLO gel Apply pea sized amount 2 times daily as needed 30 g 0     estradiol (ESTRACE) 0.1 MG/GM vaginal cream PLACE 2 GRAMS VAGINALLY 3 TIMES A WEEK 42.5 g 3     hyoscyamine (LEVSIN) 0.125 MG tablet TAKE 1 TABLET BY MOUTH UP TO FOUR TIMES DAILY AS NEEDED FOR SPASMS       Lactobacillus Rhamnosus, GG, ( PROBIOTIC DIGESTIVE CARE) CAPS Take 1 capsule by mouth       latanoprost (XALATAN) 0.005 % ophthalmic solution INT 1 GTT IN EACH EYE ONCE D       polyethylene glycol (MIRALAX) 17 GM/Dose powder Take 17 g by mouth       pravastatin (PRAVACHOL) 20 MG tablet Take 20 mg by mouth       VITAMIN D PO Take 2,000 Units by mouth       valACYclovir (VALTREX) 500 MG tablet Take 1 tablet (500 mg) by mouth 2 times daily for 3 days 6 tablet 3       Allergies, family, and social history were reviewed and documented as needed in EHR.     REVIEW OF SYSTEMS  A complete 10-point ROS was performed and pertinent positives and negatives are noted in the HPI.     PHYSICAL EXAM  /60 (BP Location: Right arm, Patient Position: Sitting, Cuff Size: Adult Regular)   Pulse 76   Ht 1.636 m (5' 4.41\")   Wt 67.9 kg (149 lb 11.2 oz)   BMI 25.37 kg/m    Body mass index is 25.37 " kg/m .  Constitutional: Vital signs reviewed, as recorded above. Patient is alert, oriented and appears in no acute distress.  Eyes: PER, EOMI, no stare, lid lag, or retraction; no conjunctival injection.  Neck: Neck supple, no palpable thyromegaly.  Lymphatic: No cervical or supraclavicular LAD.  Cardiovascular: RRR, normal S1/S2, no audible murmurs, rubs or gallops, no LE edema.  Respiratory: CTAB, without wheezes, crackles or rhonchi; normal chest wall motion and respiratory effort.  GI: Positive bowel sounds, soft, mild tenderness around the umbilicus, without rebound or guarding.  MSK: No clubbing or cyanosis; normal muscle bulk and tone.  Skin: Normal skin color, temperature, turgor and texture.  Neurological: Alert and oriented times 3. No tremor.    DATA REVIEW  Each of the following laboratory and/or imaging studies were reviewed.    Office Visit on 12/13/2021   Component Date Value Ref Range Status     Hemoglobin A1C 12/13/2021 5.5  0.0 - 5.6 % Final    Normal <5.7%   Prediabetes 5.7-6.4%    Diabetes 6.5% or higher     Note: Adopted from ADA consensus guidelines.     Cholesterol 12/13/2021 162  <=199 mg/dL Final     Triglycerides 12/13/2021 72  <=149 mg/dL Final     Direct Measure HDL 12/13/2021 57  >=50 mg/dL Final    HDL Cholesterol Reference Range:     0-2 years:   No reference ranges established for patients under 2 years old  [facility] lipid analyt[facility].    2-8 years:  Greater than 45 mg/dL     18 years and older:   Female: Greater than or equal to 50 mg/dL   Male:   Greater than or equal to 40 mg/dL     LDL Cholesterol Calculated 12/13/2021 91  <=129 mg/dL Final     Patient Fasting > 8hrs? 12/13/2021 Yes   Final     Vitamin D, Total (25-Hydroxy) 12/13/2021 48  30 - 80 ug/L Final     WBC Count 12/13/2021 5.2  4.0 - 11.0 10e3/uL Final     RBC Count 12/13/2021 4.67  3.80 - 5.20 10e6/uL Final     Hemoglobin 12/13/2021 13.6  11.7 - 15.7 g/dL Final     Hematocrit 12/13/2021 43.1  35.0 -  47.0 % Final     MCV 12/13/2021 92  78 - 100 fL Final     MCH 12/13/2021 29.1  26.5 - 33.0 pg Final     MCHC 12/13/2021 31.6  31.5 - 36.5 g/dL Final     RDW 12/13/2021 13.3  10.0 - 15.0 % Final     Platelet Count 12/13/2021 213  150 - 450 10e3/uL Final     Sodium 12/13/2021 140  136 - 145 mmol/L Final     Potassium 12/13/2021 4.1  3.5 - 5.0 mmol/L Final     Chloride 12/13/2021 105  98 - 107 mmol/L Final     Carbon Dioxide (CO2) 12/13/2021 24  22 - 31 mmol/L Final     Anion Gap 12/13/2021 11  5 - 18 mmol/L Final     Urea Nitrogen 12/13/2021 12  8 - 22 mg/dL Final     Creatinine 12/13/2021 0.81  0.60 - 1.10 mg/dL Final     Calcium 12/13/2021 9.7  8.5 - 10.5 mg/dL Final     Glucose 12/13/2021 101  70 - 125 mg/dL Final     Alkaline Phosphatase 12/13/2021 56  45 - 120 U/L Final     AST 12/13/2021 20  0 - 40 U/L Final     ALT 12/13/2021 15  0 - 45 U/L Final     Protein Total 12/13/2021 7.1  6.0 - 8.0 g/dL Final     Albumin 12/13/2021 4.3  3.5 - 5.0 g/dL Final     Bilirubin Total 12/13/2021 0.4  0.0 - 1.0 mg/dL Final     GFR Estimate 12/13/2021 75  >60 mL/min/1.73m2 Final    As of July 11, 2021, eGFR is calculated by the CKD-EPI creatinine equation, without race adjustment. eGFR can be influenced by muscle mass, exercise, and diet. The reported eGFR is an estimation only and is only applicable if the renal function is stable.     Parathyroid Hormone Intact 12/13/2021 86  10 - 86 pg/mL Final     Vitamin B12 12/13/2021 483  213 - 816 pg/mL Final         ASSESSMENT  1.  Osteoporosis.  Without history of low trauma fracture.  Potentially related to postmenopausal status and history of celiac disease. Appears to have optimal calcium intake from diet: I have given patient reading materials and she will continue aiming for sufficient amount of calcium from diet.  Perform 24 urine studies to ensure intake/absorption are optimal.  Would also screen for secondary causes of osteoporosis.  With regards to treatment, she has already  received at least 2 doses of Reclast in the past.  Off treatment for at least 6 years.  It would have been ideal to have prior DXA on the same machine to assess for decline in BMD (which would prompt reinitiation of therapy).  However, since we do not have this data we can anticipate DXA in 1/2023 to reassess BMD; I will refer patient for spine x-rays to screen for an occult fracture (if we discover fracture, this would be an indication to treat sooner).  If no occult fracture, would follow carefully and repeat DXA in 1/2023 as above to guide our decision making with regards to treatment.  We had an extensive discussion on pathophysiology of osteoporosis, optimal calcium intake, screening for secondary causes of osteoporosis as well as thresholds that would indicate initiation of therapy.    2.  Celiac disease.  Following strict gluten-free diet.    PLAN  -Aim for 1200 mg of calcium from diet  -Continue vitamin D without changes (we will adjust as needed based on results)  -Labs today  -Schedule spine x-rays  -Perform 24 hour studies  -We will request records from [facility] to at least have a gross comparator of prior BMD measurements on past DXA scans  -Return for a follow-up visit in January 2023, with DXA and labs before visit (if needed, we will coordinate closer follow-up)  -We will communicate results via [facility], or if needed by phone      Orders Placed This Encounter   Procedures     XR Thoracic Spine 2 Views     XR Lumbar Spine 2/3 Views     Vitamin D Deficiency     TSH with free T4 reflex     Sodium timed urine     Protein electrophoresis random urine     Phosphorus     Parathyroid Hormone Intact     Comprehensive metabolic panel     Calcium timed urine with Creat Ratio     Protein electrophoresis     I spent a total of 68 minutes on the date of encounter reviewing medical records, evaluating the patient, coordinating care and documenting in the EHR, as detailed above.      Rajwinder Rucker MD    Division of Diabetes, Endocrinology and Metabolism  Department of Medicine      cc: Dorota Horne MD             Again, thank you for allowing me to participate in the care of your patient.        Sincerely,        RISHABH Rucker MD

## 2022-03-10 LAB
CREAT UR-MCNC: 39 MG/DL
DEPRECATED CALCIDIOL+CALCIFEROL SERPL-MC: 41 UG/L (ref 30–80)
MICROALBUMIN UR-MCNC: <0.5 MG/DL (ref 0–1.99)
MICROALBUMIN/CREAT UR: NORMAL MG/G{CREAT}

## 2022-03-11 ENCOUNTER — ANCILLARY PROCEDURE (OUTPATIENT)
Dept: GENERAL RADIOLOGY | Facility: CLINIC | Age: 67
End: 2022-03-11
Attending: INTERNAL MEDICINE
Payer: COMMERCIAL

## 2022-03-11 DIAGNOSIS — M81.0 OSTEOPOROSIS, UNSPECIFIED OSTEOPOROSIS TYPE, UNSPECIFIED PATHOLOGICAL FRACTURE PRESENCE: ICD-10-CM

## 2022-03-11 LAB
ALBUMIN PERCENT: 65.1 % (ref 51–67)
ALBUMIN SERPL ELPH-MCNC: 4.9 G/DL (ref 3.2–4.7)
ALPHA 1 PERCENT: 2.1 % (ref 2–4)
ALPHA 2 PERCENT: 9.5 % (ref 5–13)
ALPHA1 GLOB SERPL ELPH-MCNC: 0.2 G/DL (ref 0.1–0.3)
ALPHA2 GLOB SERPL ELPH-MCNC: 0.7 G/DL (ref 0.4–0.9)
B-GLOBULIN SERPL ELPH-MCNC: 0.8 G/DL (ref 0.7–1.2)
BETA PERCENT: 10.2 % (ref 10–17)
GAMMA GLOB SERPL ELPH-MCNC: 1 G/DL (ref 0.6–1.4)
GAMMA GLOBULIN PERCENT: 13.1 % (ref 9–20)
PATH ICD:: ABNORMAL
PATH ICD:: NORMAL
PROT PATTERN SERPL ELPH-IMP: ABNORMAL
PROT PATTERN UR ELPH-IMP: NORMAL
REVIEWING PATHOLOGIST: ABNORMAL
REVIEWING PATHOLOGIST: NORMAL
TOTAL PROTEIN RANDOM URINE MG/DL: <7 MG/DL
TOTAL PROTEIN SERUM FOR ELP (SYNCED VALUE): 7.5 G/DL

## 2022-03-11 PROCEDURE — 72100 X-RAY EXAM L-S SPINE 2/3 VWS: CPT | Mod: TC | Performed by: RADIOLOGY

## 2022-03-13 PROCEDURE — 84300 ASSAY OF URINE SODIUM: CPT | Performed by: INTERNAL MEDICINE

## 2022-03-13 PROCEDURE — 81050 URINALYSIS VOLUME MEASURE: CPT | Mod: 59 | Performed by: INTERNAL MEDICINE

## 2022-03-13 PROCEDURE — 81050 URINALYSIS VOLUME MEASURE: CPT | Performed by: INTERNAL MEDICINE

## 2022-03-13 PROCEDURE — 82340 ASSAY OF CALCIUM IN URINE: CPT | Performed by: INTERNAL MEDICINE

## 2022-03-14 ENCOUNTER — TELEPHONE (OUTPATIENT)
Dept: LAB | Facility: CLINIC | Age: 67
End: 2022-03-14
Payer: COMMERCIAL

## 2022-03-14 NOTE — PROGRESS NOTES
Patient dropped off 24 hour urine collection at [facility] today. She was only given 1 jug and ended up needing to fill a container she had at home (tupperware). We are going to have to reject the specimen, unless you are wanting to have us submit regardless? The patient is not wanting to do a recollection if sample is rejected. Please advise, thank you!

## 2022-03-15 LAB
CALCIUM 24H UR-MRATE: ABNORMAL MG/(24.H)
CALCIUM UR-MCNC: <2 MG/DL
CALCIUM/CREAT UR: ABNORMAL MG/G{CREAT}
COLLECT DURATION TIME UR: 24 H
COLLECT DURATION TIME UR: 24 H
CREAT 24H UR-MRATE: 0.78 G/SPEC (ref 0.8–1.8)
CREAT UR-MCNC: 26 MG/DL
SODIUM 24H UR-SRATE: 84 MMOL/SPEC (ref 40–217)
SODIUM UR-SCNC: 28 MMOL/L
SPECIMEN VOL UR: 3000 ML
SPECIMEN VOL UR: 3000 ML

## 2022-03-15 NOTE — PROGRESS NOTES
"CHIEF COMPLAINT:  Recheck      HISTORY OF PRESENT ILLNESS    Francisca was seen in follow up for sinus pressure/headache.  Was recently seen in the [facility] for possible side effect to Levaquin (patient felt like throat was closing off, like \"everything got tight).  CT was obtained at that time which demonstrated mild mucosal thickening in both maxillary sinuses.  Recently developed new onset tinnitus.  Non-instrusive.   TMJ exercises do not seem to be helping with facial pain.     SNOT 22 = 16    AUDIOLOGY NOTE:    Francisca reports a hx of intermittent ear pain on L and intermittent aural fullness L>R for the past 6 months. Reports a hx of  positional vertigo for several years. She is scheduled to see PT in March for vertigo. Reports her father and paternal grandmother had  age-related hearing loss. Denies hearing concerns, otorrhea, ear surgery, and noise exposure.  RESULTS: Otoscopy: clear canals bilat. Tymps: type A bilat. Reflexes: CNT due to equipment. Audio: Right: normal hearing 250-3000 Hz  sloping to mild sensorineural hearing loss at 4000 Hz rising to normal hearing, Left: normal hearing 250-3000 Hz sloping to moderate  sensorineural hearing loss. Word Rec: excellent bilat. SRTs in agreement with pure tones.  REC: F/U with ENT. Retest hearing as required for medical management.    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 Discussed soft diet, NSAIDS, and a bite guard. PDR clinic referral for further management of TMJ and neck muscular tension.       Allergy Referral:    Francisca Williamson is 65 y.o. and presents today for the following health issues   HPI chief complaint: Multiple antibiotic allergies    History of " present illness: This is a pleasant 65-year-old woman I was asked to see for evaluation by Dr. Horne in regards to antibiotic allergies. Patient states in October of last year, she received her flu shot. She developed a large local swelling. She states it was very hot and swollen. She went to an urgent care. She was told she might have cellulitis was started on Keflex. After 3 days she developed a red bumpy rash on the back sides of her arms. She states it was itchy. She was then switched to clindamycin. She developed then a rash on her chest. This was not itchy but was red. She then was discontinued off antibiotics. She had no mucosal lesions or blisters. No skin sloughing. No breathing difficulty or gastrointestinal complaints. She does have a history of Macrodantin allergy but this consisted of nausea only. No hives, swelling or shortness of breath. She denies any rash with this. Of note, in November she tolerated a course of Augmentin.         REVIEW OF SYSTEMS    Review of Systems: a 10-system review is reviewed at this encounter.  See scanned document.     Alendronic acid, Clindamycin, Gluten meal, Lac bovis, Risedronate, Ibuprofen, and Nitrofuran derivatives     PHYSICAL EXAM:        HEAD: Normal appearance and symmetry:  No cutaneous lesions.      EARS:   Auricles normal     NOSE:    Dorsum:   straight       ORAL CAVITY/OROPHARYNX:    Lips:  Normal.     NECK:  Trachea:  midline       NEURO:   Alert and Oriented    GAIT AND STATION:  normal     RESPIRATORY:   Symmetry and Respiratory effort    PSYCH:   normal mood and affect    SKIN:  warm and dry         IMPRESSION:   Encounter Diagnoses   Name Primary?     Sinus headache Yes     Atypical facial pain        RECOMMENDATIONS:    Return visit 1 month with audiogram and for review of CT images.

## 2022-03-15 NOTE — TELEPHONE ENCOUNTER
If patient is not interested in doing a recollection, please run samples. I will bear this information in mind when I review results.  Rajwinder Rucker MD     Patient dropped off 24 hour urine collection at [facility] today. She was only given 1 jug and ended up needing to fill a container she had at home (tupperware). We are going to have to reject the specimen, unless you are wanting to have us submit regardless? The patient is not wanting to do a recollection if sample is rejected. Please advise, thank you!       Unsigned   Documentation

## 2022-03-15 NOTE — TELEPHONE ENCOUNTER
Thank you for your response. I will get this sample in process and sent out for testing this afternoon.

## 2022-03-16 ENCOUNTER — OFFICE VISIT (OUTPATIENT)
Dept: OTOLARYNGOLOGY | Facility: CLINIC | Age: 67
End: 2022-03-16

## 2022-03-16 ENCOUNTER — OFFICE VISIT (OUTPATIENT)
Dept: AUDIOLOGY | Facility: CLINIC | Age: 67
End: 2022-03-16
Payer: COMMERCIAL

## 2022-03-16 DIAGNOSIS — H81.10 BENIGN PAROXYSMAL POSITIONAL VERTIGO, UNSPECIFIED LATERALITY: Primary | ICD-10-CM

## 2022-03-16 DIAGNOSIS — R51.9 SINUS HEADACHE: Primary | ICD-10-CM

## 2022-03-16 DIAGNOSIS — G50.1 ATYPICAL FACIAL PAIN: ICD-10-CM

## 2022-03-16 DIAGNOSIS — H81.10 BENIGN PAROXYSMAL POSITIONAL VERTIGO, UNSPECIFIED LATERALITY: ICD-10-CM

## 2022-03-16 DIAGNOSIS — H93.19 TINNITUS: Primary | ICD-10-CM

## 2022-03-16 PROCEDURE — 99207 PR NO CHARGE LOS: CPT | Performed by: AUDIOLOGIST

## 2022-03-16 PROCEDURE — V5299 HEARING SERVICE: HCPCS | Performed by: AUDIOLOGIST

## 2022-03-16 PROCEDURE — 99214 OFFICE O/P EST MOD 30 MIN: CPT | Performed by: OTOLARYNGOLOGY

## 2022-03-16 NOTE — LETTER
"    3/16/2022         RE: Francisca Williamson  [address]  [place] [address]        Dear Colleague,    Thank you for referring your patient, Francisca Williamson, to the [facility]. Please see a copy of my visit note below.    CHIEF COMPLAINT:  Recheck      HISTORY OF PRESENT ILLNESS    Francisca was seen in follow up for sinus pressure/headache.  Was recently seen in the [facility] for possible side effect to Levaquin (patient felt like throat was closing off, like \"everything got tight).  CT was obtained at that time which demonstrated mild mucosal thickening in both maxillary sinuses.  Recently developed new onset tinnitus.  Non-instrusive.   TMJ exercises do not seem to be helping with facial pain.     SNOT 22 = 16    AUDIOLOGY NOTE:    Francisca reports a hx of intermittent ear pain on L and intermittent aural fullness L>R for the past 6 months. Reports a hx of  positional vertigo for several years. She is scheduled to see PT in March for vertigo. Reports her father and paternal grandmother had  age-related hearing loss. Denies hearing concerns, otorrhea, ear surgery, and noise exposure.  RESULTS: Otoscopy: clear canals bilat. Tymps: type A bilat. Reflexes: CNT due to equipment. Audio: Right: normal hearing 250-3000 Hz  sloping to mild sensorineural hearing loss at 4000 Hz rising to normal hearing, Left: normal hearing 250-3000 Hz sloping to moderate  sensorineural hearing loss. Word Rec: excellent bilat. SRTs in agreement with pure tones.  REC: F/U with ENT. Retest hearing as required for medical management.    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 Discussed soft diet, NSAIDS, and a bite guard. PDR " clinic referral for further management of TMJ and neck muscular tension.       Allergy Referral:    Francisca Williamson is 65 y.o. and presents today for the following health issues   HPI chief complaint: Multiple antibiotic allergies    History of present illness: This is a pleasant 65-year-old woman I was asked to see for evaluation by Dr. Horne in regards to antibiotic allergies. Patient states in October of last year, she received her flu shot. She developed a large local swelling. She states it was very hot and swollen. She went to an urgent care. She was told she might have cellulitis was started on Keflex. After 3 days she developed a red bumpy rash on the back sides of her arms. She states it was itchy. She was then switched to clindamycin. She developed then a rash on her chest. This was not itchy but was red. She then was discontinued off antibiotics. She had no mucosal lesions or blisters. No skin sloughing. No breathing difficulty or gastrointestinal complaints. She does have a history of Macrodantin allergy but this consisted of nausea only. No hives, swelling or shortness of breath. She denies any rash with this. Of note, in November she tolerated a course of Augmentin.         REVIEW OF SYSTEMS    Review of Systems: a 10-system review is reviewed at this encounter.  See scanned document.     Alendronic acid, Clindamycin, Gluten meal, Lac bovis, Risedronate, Ibuprofen, and Nitrofuran derivatives     PHYSICAL EXAM:        HEAD: Normal appearance and symmetry:  No cutaneous lesions.      EARS:   Auricles normal     NOSE:    Dorsum:   straight       ORAL CAVITY/OROPHARYNX:    Lips:  Normal.     NECK:  Trachea:  midline       NEURO:   Alert and Oriented    GAIT AND STATION:  normal     RESPIRATORY:   Symmetry and Respiratory effort    PSYCH:   normal mood and affect    SKIN:  warm and dry         IMPRESSION:   Encounter Diagnoses   Name Primary?     Sinus headache Yes     Atypical facial pain         RECOMMENDATIONS:    Return visit 1 month with audiogram and for review of CT images.         Again, thank you for allowing me to participate in the care of your patient.        Sincerely,        Dilshad Prakash MD

## 2022-03-16 NOTE — PATIENT INSTRUCTIONS
Follow visit  1 month for audiogram review and review CT images.     Patient Education     Tinnitus (Ringing in the Ears)     Treatment may include maskers and hearing aids.   Tinnitus is the term for a noise in your ear not caused by an outside sound. The noise might be a ringing, clicking, hiss, or roar. It can vary in pitch. It may be soft or very loud. For some people, this is a minor problem. But for others, the noise can make it hard to hear, work, and even sleep. When tinnitus can't be cured, treatments may help.  What causes tinnitus?  Loud noises, hearing loss, and earwax can cause tinnitus. So can certain medicines. Large amounts of aspirin or caffeine are sometimes to blame. In many cases, the exact cause of tinnitus is not known.  How is tinnitus treated?  Finding and removing the cause is the best way to treat tinnitus. So your healthcare provider may refer you to an ear, nose, and throat doctor (ENT or otolaryngologist). Your hearing may also be checked by a hearing specialist (audiologist). If you have hearing loss, wearing a hearing aid may help your tinnitus. When the cause can't be found, the tinnitus itself may be treated. Some of the treatments are listed below. Your healthcare provider can tell you more about them:    Maskers. These are small devices that look like hearing aids. They have a pleasant sound that helps cover up the ringing in your ears. Hearing aids and maskers are sometimes used together.    Medicines that treat anxiety and depression. These may ease tinnitus in some people.    Hypnosis or relaxation therapy. This may help head noise seem less severe.    Tinnitus retraining therapy. This combines counseling and maskers. Both can help take your mind off the tinnitus.  To learn more    American Speech-Hearing-Language Association 343-799-5250 www.leo.org    American Tinnitus Association 607-852-3939 www.ruth.org    National Crawford on Deafness and other Communication Disorders  902-708-5633 www.nid.nih.gov/    Melanie last reviewed this educational content on 9/1/2019 2000-2021 [employer]. All rights reserved. This information is not intended as a substitute for professional medical care. Always follow your healthcare professional's instructions.

## 2022-03-16 NOTE — PROGRESS NOTES
AUDIOLOGY REPORT    SUMMARY: Patient was previously seen 2/9/22 for an audiogram and ENT appointment with Dr. Hernandez. She was diagnosed with TMJ and [facility] The therapist at that clinic reported that she does not have TMJ at the joint, but rather the muscles around the joint. Eight days after completing TMJ therapy, she has experienced constant high-pitched tinnitus at nighttime, especially when it is quiet. Her primary care provider referred her back to ENT as she was unaware if the TMJ therapy could cause tinnitus. Patient does not wish to repeat the audiogram as it was completed recently and she has not experienced any other changes in her hearing. She denies recent changes to her medications or recent illness. She reports that she does not sleep well. Denies recent stress and anxiety.    Audiology visit completed.    RECOMMENDATIONS: Discussed factors that can worsen tinnitus. Discussed the tinnitus may be a result of the high-frequency hearing loss in the left ear that she is now perceiving. Patient released to ENT for follow-up. Return as recommended for medical management.     Chela Tabares, CCC-A  Clinical Audiologist  [place] #[id number]

## 2022-03-21 ENCOUNTER — MYC MEDICAL ADVICE (OUTPATIENT)
Dept: ENDOCRINOLOGY | Facility: CLINIC | Age: 67
End: 2022-03-21
Payer: COMMERCIAL

## 2022-03-21 DIAGNOSIS — M81.0 OSTEOPOROSIS, UNSPECIFIED OSTEOPOROSIS TYPE, UNSPECIFIED PATHOLOGICAL FRACTURE PRESENCE: Primary | ICD-10-CM

## 2022-03-26 ENCOUNTER — HOSPITAL ENCOUNTER (OUTPATIENT)
Dept: CT IMAGING | Facility: HOSPITAL | Age: 67
Discharge: HOME | End: 2022-03-26
Attending: OTOLARYNGOLOGY | Admitting: OTOLARYNGOLOGY
Payer: COMMERCIAL

## 2022-03-26 DIAGNOSIS — R51.9 SINUS HEADACHE: ICD-10-CM

## 2022-03-26 DIAGNOSIS — G50.1 ATYPICAL FACIAL PAIN: ICD-10-CM

## 2022-03-26 PROCEDURE — 70486 CT MAXILLOFACIAL W/O DYE: CPT

## 2022-03-28 ENCOUNTER — TELEPHONE (OUTPATIENT)
Dept: OTOLARYNGOLOGY | Facility: CLINIC | Age: 67
End: 2022-03-28
Payer: COMMERCIAL

## 2022-03-28 ENCOUNTER — MYC MEDICAL ADVICE (OUTPATIENT)
Dept: OTOLARYNGOLOGY | Facility: CLINIC | Age: 67
End: 2022-03-28
Payer: COMMERCIAL

## 2022-03-28 NOTE — TELEPHONE ENCOUNTER
Called patient and left VM to call back.    [facility]      Penelope Lee RN  [facility]  ENT  [address] [address]  [email or web address]   Office:[phone]  Employed by [facility]

## 2022-03-28 NOTE — TELEPHONE ENCOUNTER
Talked with Francisca about her CT sinus.  Per Dr. Prakash I advised the patient of a normal CT sinus.  Patient expressed understanding.    [facility]      Penelope Lee RN  [facility]  ENT  [facility] [address]  [email or web address]   Office:[phone]  Employed by [facility]

## 2022-03-28 NOTE — TELEPHONE ENCOUNTER
----- Message from Dilshad Prakash MD sent at 3/28/2022 11:47 AM CDT -----  Please advise patient of normal sinus CT

## 2022-03-29 NOTE — TELEPHONE ENCOUNTER
Talked with Francisca about her follow up visit with Dr. Prakash.  I mentioned to her that I did speak with Dr. Prakash and he would still like her to come in for her audiology appointment even though her CT was normal.  Patient expressed understanding.    [facility]      Penelope Lee RN  [facility]  ENT  [address] [address]  [email or web address]   Office:[phone]  Employed by [facility]

## 2022-04-04 ENCOUNTER — IMMUNIZATION (OUTPATIENT)
Dept: NURSING | Facility: CLINIC | Age: 67
End: 2022-04-04
Payer: COMMERCIAL

## 2022-04-04 ENCOUNTER — MYC MEDICAL ADVICE (OUTPATIENT)
Dept: FAMILY MEDICINE | Facility: CLINIC | Age: 67
End: 2022-04-04

## 2022-04-04 DIAGNOSIS — E53.8 B12 DEFICIENCY: Primary | ICD-10-CM

## 2022-04-04 PROCEDURE — 91305 COVID-19,PF,PFIZER (12+ YRS): CPT

## 2022-04-04 PROCEDURE — 0054A COVID-19,PF,PFIZER (12+ YRS): CPT

## 2022-04-05 RX ORDER — SYRINGE, DISPOSABLE, 1 ML
SYRINGE, EMPTY DISPOSABLE MISCELLANEOUS
Qty: 100 EACH | Refills: 0 | Status: SHIPPED | OUTPATIENT
Start: 2022-04-05 | End: 2023-12-04

## 2022-04-05 NOTE — TELEPHONE ENCOUNTER
"Outpatient Medication Detail     Disp Refills Start End MEGHAN   B-D TB SYRINGE 25G X 5/8\" 1 ML MISC   3/31/2020  Yes   Sig: FOR HOME USE   Class: Historical   Order: [id number]     Routing refill request to provider for review/approval because:  Drug not on the G refill protocol   Medication is reported/historical    Last office visit provider:  12/13/21     Requested Prescriptions   Pending Prescriptions Disp Refills     BD SYRINGE SLIP TIP 25G X 5/8\" 1 ML MISC [Pharmacy Med Name: B-D #[id number] SYR/NDL 1ML 25GX5/8 TB]       Sig: FOR HOME USE       There is no refill protocol information for this order          Gil Whitmore RN 04/05/22 2:26 PM  "

## 2022-04-10 ENCOUNTER — HEALTH MAINTENANCE LETTER (OUTPATIENT)
Age: 67
End: 2022-04-10

## 2022-04-13 ENCOUNTER — OFFICE VISIT (OUTPATIENT)
Dept: AUDIOLOGY | Facility: CLINIC | Age: 67
End: 2022-04-13

## 2022-04-13 DIAGNOSIS — H90.3 SENSORINEURAL HEARING LOSS, BILATERAL: Primary | ICD-10-CM

## 2022-04-13 PROCEDURE — 92567 TYMPANOMETRY: CPT | Performed by: AUDIOLOGIST

## 2022-04-13 PROCEDURE — 99207 PR DROP WITH A PROCEDURE: CPT | Mod: 25 | Performed by: AUDIOLOGIST

## 2022-04-13 PROCEDURE — 92557 COMPREHENSIVE HEARING TEST: CPT | Performed by: AUDIOLOGIST

## 2022-04-13 NOTE — PROGRESS NOTES
AUDIOLOGY REPORT    SUMMARY: Audiology visit completed. See audiogram for results.      RECOMMENDATIONS: Patient does not need to meet with ENT today as her hearing is stable. She should return as recommended for medical management or if new concerns arise.    Chela Tabares, CCC-A  Clinical Audiologist  [place] #[id number]

## 2022-04-25 DIAGNOSIS — E78.5 HYPERLIPIDEMIA, UNSPECIFIED HYPERLIPIDEMIA TYPE: Primary | ICD-10-CM

## 2022-05-02 NOTE — TELEPHONE ENCOUNTER
Patient calling to follow up. Pt received another notification from pharmacy that provider has not authorize Rx refill. Pt would like to know what is going on. Pt has 2 tablets left. Pt would like a call back to let her know; OK to leave detailed message.

## 2022-05-03 RX ORDER — PRAVASTATIN SODIUM 20 MG
TABLET ORAL
Qty: 90 TABLET | Refills: 0 | Status: SHIPPED | OUTPATIENT
Start: 2022-05-03 | End: 2022-07-25

## 2022-07-06 DIAGNOSIS — M62.89 PELVIC FLOOR DYSFUNCTION IN FEMALE: ICD-10-CM

## 2022-07-06 DIAGNOSIS — E53.8 B12 DEFICIENCY: Primary | ICD-10-CM

## 2022-07-07 RX ORDER — ESTRADIOL 0.1 MG/G
CREAM VAGINAL
Qty: 42.5 G | Refills: 1 | Status: SHIPPED | OUTPATIENT
Start: 2022-07-07 | End: 2022-08-12

## 2022-07-07 NOTE — TELEPHONE ENCOUNTER
"Last Written Prescription Date:  12/31/21  Last Fill Quantity: 42.5,  # refills: 3   Last office visit provider:  12/13/21     Requested Prescriptions   Pending Prescriptions Disp Refills     cyanocobalamin (CYANOCOBALAMIN) 1000 MCG/ML injection [Pharmacy Med Name: CYANOCOBALAMIN 1000MCG/ML INJ, 1ML] 3 mL 0     Sig: INJECT 1ML(1,000MCG TOTAL) INTO THE SHOULDER, THIGH, OR BUTTOCKS EVERY 30 DAYS       Vitamin Supplements (Adult) Protocol Passed - 7/6/2022 12:13 PM        Passed - High dose Vitamin D not ordered        Passed - Recent (12 mo) or future (30 days) visit within the authorizing provider's specialty     Patient has had an office visit with the authorizing provider or a provider within the authorizing providers department within the previous 12 mos or has a future within next 30 days. See \"Patient Info\" tab in inbasket, or \"Choose Columns\" in Meds & Orders section of the refill encounter.              Passed - Medication is active on med list           estradiol (ESTRACE) 0.1 MG/GM vaginal cream [Pharmacy Med Name: ESTRADIOL 0.01% VAG CREAM 42.5GM] 42.5 g 3     Sig: PLACE 2 GRAMS VAGINALLY 3 TIMES A WEEK       Hormone Replacement Therapy Passed - 7/6/2022 12:13 PM        Passed - Blood pressure under 140/90 in past 12 months     BP Readings from Last 3 Encounters:   03/09/22 104/60   12/13/21 118/61   11/08/21 120/70                 Passed - Recent (12 mo) or future (30 days) visit within the authorizing provider's specialty     Patient has had an office visit with the authorizing provider or a provider within the authorizing providers department within the previous 12 mos or has a future within next 30 days. See \"Patient Info\" tab in inbasket, or \"Choose Columns\" in Meds & Orders section of the refill encounter.              Passed - Patient has mammogram in past 2 years on file if age 50-75        Passed - Medication is active on med list        Passed - Patient is 18 years of age or older        Passed - " No active pregnancy on record        Passed - No positive pregnancy test on record in past 12 months             Reva Arzate, RN 07/07/22 6:45 PM

## 2022-07-07 NOTE — TELEPHONE ENCOUNTER
"Routing refill request to provider for review/approval because:  Medication is reported/historical    Last Written Prescription Date:    Last Fill Quantity: ,  # refills:    Last office visit provider:  12/13/21     Requested Prescriptions   Pending Prescriptions Disp Refills     cyanocobalamin (CYANOCOBALAMIN) 1000 MCG/ML injection [Pharmacy Med Name: CYANOCOBALAMIN 1000MCG/ML INJ, 1ML] 3 mL 0     Sig: INJECT 1ML(1,000MCG TOTAL) INTO THE SHOULDER, THIGH, OR BUTTOCKS EVERY 30 DAYS       Vitamin Supplements (Adult) Protocol Passed - 7/6/2022 12:13 PM        Passed - High dose Vitamin D not ordered        Passed - Recent (12 mo) or future (30 days) visit within the authorizing provider's specialty     Patient has had an office visit with the authorizing provider or a provider within the authorizing providers department within the previous 12 mos or has a future within next 30 days. See \"Patient Info\" tab in inbasket, or \"Choose Columns\" in Meds & Orders section of the refill encounter.              Passed - Medication is active on med list         Signed Prescriptions Disp Refills    estradiol (ESTRACE) 0.1 MG/GM vaginal cream 42.5 g 1     Sig: PLACE 2 GRAMS VAGINALLY 3 TIMES A WEEK       Hormone Replacement Therapy Passed - 7/6/2022 12:13 PM        Passed - Blood pressure under 140/90 in past 12 months     BP Readings from Last 3 Encounters:   03/09/22 104/60   12/13/21 118/61   11/08/21 120/70                 Passed - Recent (12 mo) or future (30 days) visit within the authorizing provider's specialty     Patient has had an office visit with the authorizing provider or a provider within the authorizing providers department within the previous 12 mos or has a future within next 30 days. See \"Patient Info\" tab in inbasket, or \"Choose Columns\" in Meds & Orders section of the refill encounter.              Passed - Patient has mammogram in past 2 years on file if age 50-75        Passed - Medication is active on med " list        Passed - Patient is 18 years of age or older        Passed - No active pregnancy on record        Passed - No positive pregnancy test on record in past 12 months             Reva Arzate RN 07/07/22 6:46 PM

## 2022-07-09 RX ORDER — CYANOCOBALAMIN 1000 UG/ML
INJECTION, SOLUTION INTRAMUSCULAR; SUBCUTANEOUS
Qty: 3 ML | Refills: 0 | Status: SHIPPED | OUTPATIENT
Start: 2022-07-09 | End: 2022-10-03

## 2022-07-16 ENCOUNTER — OFFICE VISIT (OUTPATIENT)
Dept: FAMILY MEDICINE | Facility: CLINIC | Age: 67
End: 2022-07-16
Payer: COMMERCIAL

## 2022-07-16 VITALS
HEART RATE: 72 BPM | OXYGEN SATURATION: 97 % | BODY MASS INDEX: 25.25 KG/M2 | DIASTOLIC BLOOD PRESSURE: 80 MMHG | RESPIRATION RATE: 18 BRPM | SYSTOLIC BLOOD PRESSURE: 125 MMHG | WEIGHT: 149 LBS | TEMPERATURE: 98 F

## 2022-07-16 DIAGNOSIS — H92.02 LEFT EAR PAIN: ICD-10-CM

## 2022-07-16 DIAGNOSIS — J02.9 SORE THROAT: Primary | ICD-10-CM

## 2022-07-16 PROCEDURE — 99213 OFFICE O/P EST LOW 20 MIN: CPT | Mod: CS | Performed by: NURSE PRACTITIONER

## 2022-07-16 PROCEDURE — U0003 INFECTIOUS AGENT DETECTION BY NUCLEIC ACID (DNA OR RNA); SEVERE ACUTE RESPIRATORY SYNDROME CORONAVIRUS 2 (SARS-COV-2) (CORONAVIRUS DISEASE [COVID-19]), AMPLIFIED PROBE TECHNIQUE, MAKING USE OF HIGH THROUGHPUT TECHNOLOGIES AS DESCRIBED BY CMS-2020-01-R: HCPCS | Performed by: NURSE PRACTITIONER

## 2022-07-16 PROCEDURE — U0005 INFEC AGEN DETEC AMPLI PROBE: HCPCS | Performed by: NURSE PRACTITIONER

## 2022-07-16 NOTE — PROGRESS NOTES
Assessment & Plan     ICD-10-CM    1. Sore throat  J02.9 Acute - unstable. As per HPI. Workup today   2. Left ear pain  H92.02 Completely WNL; COVID pending. All sx have begun to resolve or improve. Recommend follow-up as noted.     I spent a total of 10 minutes on the day of the visit.   Time spent doing chart review, history and exam, documentation and further activities per the note    Return if symptoms worsen or fail to improve.    JEANNE Kellogg CNP  [facility] HERMELINDAFREDDY Espinosa is a 67 year old female who presents to clinic today for the following health issues:  Chief Complaint   Patient presents with     Otalgia     Left ear pain     Francisca Williamson is a 67 year old female who presents to clinic today with c/o of an abrupt occurrence of left ear pain - sharp, stabbing - no loss of hearing. No trauma to ear - no insertion in the ear. Did note the presnece of a sore throat on the left side as well. Thursday evening a very sore throat for a couple hours; mild sore throat for a couple days - then it resolved. Denies CP, palpitations, fatigue, nausea, vomiting, increased SOB/FERNANDEZ, fever, chills, and/or b/b concerns today.      Constitutional, HEENT, cardiovascular, pulmonary, gi and gu systems are negative, except as otherwise noted.      Objective    /80   Pulse 72   Temp 98  F (36.7  C) (Tympanic)   Resp 18   Wt 67.6 kg (149 lb)   SpO2 97%   BMI 25.25 kg/m       GENERAL: healthy, alert and no distress  HENT: normal cephalic/atraumatic, ear canals and TM's normal, nose and mouth without ulcers or lesions, oropharynx clear and oral mucous membranes moist  NECK: no adenopathy, no asymmetry, masses, or scars and thyroid normal to palpation  RESP: lungs clear to auscultation - no rales, rhonchi or wheezes  MS: no gross musculoskeletal defects noted, no edema  PSYCH: mentation appears normal, affect normal/bright and anxious

## 2022-07-17 LAB — SARS-COV-2 RNA RESP QL NAA+PROBE: NEGATIVE

## 2022-07-28 ENCOUNTER — E-VISIT (OUTPATIENT)
Dept: FAMILY MEDICINE | Facility: CLINIC | Age: 67
End: 2022-07-28
Payer: COMMERCIAL

## 2022-07-28 ENCOUNTER — TELEPHONE (OUTPATIENT)
Dept: FAMILY MEDICINE | Facility: CLINIC | Age: 67
End: 2022-07-28

## 2022-07-28 DIAGNOSIS — R10.9 FLANK PAIN: Primary | ICD-10-CM

## 2022-07-28 DIAGNOSIS — R20.2 NUMBNESS AND TINGLING SENSATION OF SKIN: ICD-10-CM

## 2022-07-28 DIAGNOSIS — R20.0 NUMBNESS AND TINGLING SENSATION OF SKIN: ICD-10-CM

## 2022-07-28 DIAGNOSIS — Z86.19 H/O COLD SORES: ICD-10-CM

## 2022-07-28 PROCEDURE — 99421 OL DIG E/M SVC 5-10 MIN: CPT | Performed by: FAMILY MEDICINE

## 2022-07-28 RX ORDER — VALACYCLOVIR HYDROCHLORIDE 1 G/1
1000 TABLET, FILM COATED ORAL 3 TIMES DAILY
Qty: 21 TABLET | Refills: 0 | Status: SHIPPED | OUTPATIENT
Start: 2022-07-28 | End: 2022-08-17

## 2022-07-28 RX ORDER — VALACYCLOVIR HYDROCHLORIDE 500 MG/1
TABLET, FILM COATED ORAL
Qty: 6 TABLET | Refills: 3 | Status: SHIPPED | OUTPATIENT
Start: 2022-07-28

## 2022-07-28 NOTE — TELEPHONE ENCOUNTER
Patient states that she thinks she might have shingles. It started about a week ago. There is no rash. She has a redness that is on the lower back that wraps around to her stomach. Patient states that it is uncomformable and she had trouble sleeping last night.    I did inform patient that she should go to [facility] since we have no openings. Patient states that she would rather go to [facility] clinic where she knows the doctors. Patient is wondering if you are willing to add her on today or should patient do an E-visit?    Please call patient mobile phone number    Ok to leave detailed message

## 2022-07-28 NOTE — PATIENT INSTRUCTIONS
Thank you for choosing us for your care. I have placed an order for a prescription so that you can start treatment. View your full visit summary for details by clicking on the link below. Your pharmacist will able to address any questions you may have about the medication.     If you're not feeling better within 5-7 days, please schedule an appointment.  You can schedule an appointment right here in [facility], or call [phone]  If the visit is for the same symptoms as your eVisit, we'll refund the cost of your eVisit if seen within seven days.

## 2022-07-28 NOTE — TELEPHONE ENCOUNTER
"Last Written Prescription Date:  7/16/21  Last Fill Quantity: 6,  # refills: 3   Last office visit provider:  12/13/21     Requested Prescriptions   Pending Prescriptions Disp Refills     valACYclovir (VALTREX) 500 MG tablet [Pharmacy Med Name: VALACYCLOVIR 500MG TABLETS] 6 tablet 3     Sig: TAKE 1 TABLET(500 MG) BY MOUTH TWICE DAILY FOR 3 DAYS       Antivirals for Herpes Protocol Passed - 7/28/2022 10:23 AM        Passed - Patient is age 12 or older        Passed - Recent (12 mo) or future (30 days) visit within the authorizing provider's specialty     Patient has had an office visit with the authorizing provider or a provider within the authorizing providers department within the previous 12 mos or has a future within next 30 days. See \"Patient Info\" tab in inbasket, or \"Choose Columns\" in Meds & Orders section of the refill encounter.              Passed - Medication is active on med list        Passed - Normal serum creatinine on file in past 12 months     Recent Labs   Lab Test 03/09/22  1657   CR 0.86       Ok to refill medication if creatinine is low               Reva Arzate, RN 07/28/22 3:56 PM  "

## 2022-07-31 ENCOUNTER — HEALTH MAINTENANCE LETTER (OUTPATIENT)
Age: 67
End: 2022-07-31

## 2022-08-04 ENCOUNTER — OFFICE VISIT (OUTPATIENT)
Dept: FAMILY MEDICINE | Facility: CLINIC | Age: 67
End: 2022-08-04
Payer: COMMERCIAL

## 2022-08-04 VITALS
DIASTOLIC BLOOD PRESSURE: 72 MMHG | BODY MASS INDEX: 25.12 KG/M2 | SYSTOLIC BLOOD PRESSURE: 116 MMHG | HEART RATE: 79 BPM | WEIGHT: 148.2 LBS | RESPIRATION RATE: 16 BRPM

## 2022-08-04 DIAGNOSIS — L71.9 ROSACEA: ICD-10-CM

## 2022-08-04 DIAGNOSIS — H93.8X3 PLUGGED FEELING IN EAR, BILATERAL: ICD-10-CM

## 2022-08-04 DIAGNOSIS — R51.9 SINUS HEADACHE: ICD-10-CM

## 2022-08-04 DIAGNOSIS — M81.0 OSTEOPOROSIS, UNSPECIFIED OSTEOPOROSIS TYPE, UNSPECIFIED PATHOLOGICAL FRACTURE PRESENCE: ICD-10-CM

## 2022-08-04 DIAGNOSIS — R10.9 LEFT FLANK PAIN: ICD-10-CM

## 2022-08-04 DIAGNOSIS — H92.02 LEFT EAR PAIN: ICD-10-CM

## 2022-08-04 DIAGNOSIS — R31.9 HEMATURIA, UNSPECIFIED TYPE: Primary | ICD-10-CM

## 2022-08-04 DIAGNOSIS — J02.9 SORE THROAT: ICD-10-CM

## 2022-08-04 DIAGNOSIS — R09.81 NASAL CONGESTION: ICD-10-CM

## 2022-08-04 DIAGNOSIS — M79.652 PAIN OF LEFT THIGH: ICD-10-CM

## 2022-08-04 LAB
ALBUMIN UR-MCNC: NEGATIVE MG/DL
APPEARANCE UR: CLEAR
BACTERIA #/AREA URNS HPF: ABNORMAL /HPF
BILIRUB UR QL STRIP: NEGATIVE
COLOR UR AUTO: YELLOW
GLUCOSE UR STRIP-MCNC: NEGATIVE MG/DL
HGB UR QL STRIP: ABNORMAL
KETONES UR STRIP-MCNC: NEGATIVE MG/DL
LEUKOCYTE ESTERASE UR QL STRIP: NEGATIVE
NITRATE UR QL: NEGATIVE
PH UR STRIP: 7 [PH] (ref 5–8)
RBC #/AREA URNS AUTO: ABNORMAL /HPF
SP GR UR STRIP: 1.01 (ref 1–1.03)
SQUAMOUS #/AREA URNS AUTO: ABNORMAL /LPF
UROBILINOGEN UR STRIP-ACNC: 0.2 E.U./DL
WBC #/AREA URNS AUTO: ABNORMAL /HPF

## 2022-08-04 PROCEDURE — 81001 URINALYSIS AUTO W/SCOPE: CPT | Performed by: FAMILY MEDICINE

## 2022-08-04 PROCEDURE — 87086 URINE CULTURE/COLONY COUNT: CPT | Performed by: FAMILY MEDICINE

## 2022-08-04 PROCEDURE — 99214 OFFICE O/P EST MOD 30 MIN: CPT | Performed by: FAMILY MEDICINE

## 2022-08-04 NOTE — PROGRESS NOTES
"  Assessment & Plan     Hematuria, unspecified type  No red blood cells seen in her UA.  No need to see urology for now. She agreed with plan.   - UA with Microscopic reflex to Culture - lab collect  - UA with Microscopic reflex to Culture - lab collect  - Urine Microscopic    Left flank pain  I gave her Valtrex for potential shingles episode.  Pain has improved.  Monitor for now.    Pain of left thigh  For self with B12 injection earlier this week and still having pain in the left thigh.  Exam unremarkable.  Monitor for now    Sore throat  Left ear pain  Nasal congestion  Sinus headache  Plugged feeling in ear, bilateral  2-3 hours a day. Saw ENT before. CT sinuses unremarkable.  Unclear cause.  Discussed we could try allergy medications but she has tried over-the-counter allergy meds before which made her sleepy and dizzy.  May be trigeminal neuralgia?  Monitor for now    Rosacea  - metroNIDAZOLE (METROCREAM) 0.75 % external cream  Dispense: 45 g; Refill: 1        Return in about 5 months (around 1/4/2023) for Routine preventive, with me, in person.    Dorota Horne MD  [facility]    Ken Espinosa is a 67 year old, presenting for the following health issues:  RECHECK (Follow up on blood in urine )      History of Present Illness       Reason for visit:  Follow-up to having a UA which showed blood, recheck for this issue    She eats 4 or more servings of fruits and vegetables daily.She consumes 1 sweetened beverage(s) daily.She exercises with enough effort to increase her heart rate 30 to 60 minutes per day.  She exercises with enough effort to increase her heart rate 6 days per week.   She is taking medications regularly.     Chief Complaint   Patient presents with     RECHECK     Follow up on blood in urine      OB recommends rechecking UA.   Pt sent me sharing.it message:   7/17/22: \"Elmer Horne,  I have recently been treated for a presumptive UTI. I had tried to get " "in to see you, but was unable to, so went to my OB/GYN-Dr. Westfall on 7/12. They did a UA and it showed some blood in the urine (not visible). I haven't gotten the culture results as yet. They were suppose to be back Friday, but I didn't hear from the clinic/no results posted in the chart. Dr. Westfall put me on Bactrim and I finished the last dose on Friday, 7/15. Dr. Westfall wants me to have my urine retested  in 2-3 weeks because of the amount of blood in the urine(not visible, but the UA result says \"large\").  I was hoping to do that at your office through an appointment with you. Just wanted to let you know that I will call the clinic and see if I can get an appointment  in the timeframe  has suggested. Thanks for your help. Francisca\"    Then another [employer] message on 7/18/2022: \"Just wanted to let you know I just got the UC test results back from test done on 7/12  at my OB/GYN Dr. Westfall. The UC results were negative, with a note that says results are within normal limits, and a note about mixed urogenital tricia 10,000-25,000 colony forming units/ml ( not sure what that means but assuming it's within normal limits). So looks like I was on the Bactrim and didn't really need to be. Oh well. The OB/GYN also made a note that if urinary symptoms persist to see a urologist. I am glad I have my appointment with you on August 4th. Much appreciated that your nurse scheduled that for me. Thanks much, see you then. Francisca\"    Also patient is an E visit on 7/28/2022 reporting left flank discomfort but no rash give her antiviral treatment with Valtrex for potential shingles.    Today reports her urinary symptoms have improved. She reports she had burning at end of urinary stream and also sometimes after. OBGYN has given her estradiol before for this but didn't help. She took bactrim for 3 days however.     TMJ therapy didn't help. otc allergy medication didn't help.   Saw ENT for sinus symptoms but no " diagnosis was made.   Has seen dr. Bliss before for keflex allergy testing.    No rash on left flank. Used to had a shingles episode in left thigh. Now still has a mild discomfort but no rash.     2-3 hours a day get mild sore throat, ears plugged, pain in TMJ areas, nasal sensitivity, mild temporal headache, temp of 99F and achy shoulders. has seen ENT and CT sinuses unremarkable.     Review of Systems   Constitutional, HEENT, cardiovascular, pulmonary, gi and gu systems are negative, except as otherwise noted.      Objective    /72   Pulse 79   Resp 16   Wt 67.2 kg (148 lb 3.2 oz)   BMI 25.12 kg/m    Body mass index is 25.12 kg/m .  Physical Exam   GENERAL: healthy, alert and no distress  MS: no gross musculoskeletal defects noted, no edema  SKIN: no rash left abdomen/flank.   NEURO: Normal strength and tone, mentation intact and speech normal  PSYCH: mentation appears normal, affect normal/bright            .  ..

## 2022-08-05 ENCOUNTER — LAB (OUTPATIENT)
Dept: LAB | Facility: CLINIC | Age: 67
End: 2022-08-05
Attending: FAMILY MEDICINE
Payer: COMMERCIAL

## 2022-08-05 DIAGNOSIS — Z20.822 CLOSE EXPOSURE TO 2019 NOVEL CORONAVIRUS: ICD-10-CM

## 2022-08-05 PROCEDURE — U0003 INFECTIOUS AGENT DETECTION BY NUCLEIC ACID (DNA OR RNA); SEVERE ACUTE RESPIRATORY SYNDROME CORONAVIRUS 2 (SARS-COV-2) (CORONAVIRUS DISEASE [COVID-19]), AMPLIFIED PROBE TECHNIQUE, MAKING USE OF HIGH THROUGHPUT TECHNOLOGIES AS DESCRIBED BY CMS-2020-01-R: HCPCS

## 2022-08-05 PROCEDURE — U0005 INFEC AGEN DETEC AMPLI PROBE: HCPCS

## 2022-08-06 LAB
BACTERIA UR CULT: NO GROWTH
SARS-COV-2 RNA RESP QL NAA+PROBE: NEGATIVE

## 2022-08-12 ENCOUNTER — MYC MEDICAL ADVICE (OUTPATIENT)
Dept: FAMILY MEDICINE | Facility: CLINIC | Age: 67
End: 2022-08-12

## 2022-08-12 DIAGNOSIS — M62.89 PELVIC FLOOR DYSFUNCTION IN FEMALE: ICD-10-CM

## 2022-08-12 RX ORDER — ESTRADIOL 0.1 MG/G
CREAM VAGINAL
Qty: 42.5 G | Refills: 1 | Status: SHIPPED | OUTPATIENT
Start: 2022-08-12 | End: 2022-12-21

## 2022-08-17 ENCOUNTER — HOSPITAL ENCOUNTER (OUTPATIENT)
Dept: GENERAL RADIOLOGY | Facility: HOSPITAL | Age: 67
Discharge: HOME | End: 2022-08-17
Attending: PHYSICIAN ASSISTANT
Payer: COMMERCIAL

## 2022-08-17 ENCOUNTER — OFFICE VISIT (OUTPATIENT)
Dept: FAMILY MEDICINE | Facility: CLINIC | Age: 67
End: 2022-08-17
Payer: COMMERCIAL

## 2022-08-17 ENCOUNTER — ANCILLARY PROCEDURE (OUTPATIENT)
Dept: MAMMOGRAPHY | Facility: CLINIC | Age: 67
End: 2022-08-17
Attending: FAMILY MEDICINE
Payer: COMMERCIAL

## 2022-08-17 VITALS
WEIGHT: 146 LBS | DIASTOLIC BLOOD PRESSURE: 78 MMHG | TEMPERATURE: 98.3 F | HEART RATE: 68 BPM | BODY MASS INDEX: 24.74 KG/M2 | OXYGEN SATURATION: 98 % | SYSTOLIC BLOOD PRESSURE: 113 MMHG | RESPIRATION RATE: 18 BRPM

## 2022-08-17 DIAGNOSIS — S70.02XA CONTUSION OF LEFT HIP, INITIAL ENCOUNTER: ICD-10-CM

## 2022-08-17 DIAGNOSIS — T14.8XXA HEMATOMA OF SKIN: Primary | ICD-10-CM

## 2022-08-17 DIAGNOSIS — Z12.31 VISIT FOR SCREENING MAMMOGRAM: ICD-10-CM

## 2022-08-17 LAB
CALCIUM 24H UR-MRATE: 0.12 G/SPEC (ref 0.1–0.3)
CALCIUM UR-MCNC: 2.4 MG/DL
COLLECT DURATION TIME UR: 24 H
SPECIMEN VOL UR: 5150 ML

## 2022-08-17 PROCEDURE — 99214 OFFICE O/P EST MOD 30 MIN: CPT | Performed by: PHYSICIAN ASSISTANT

## 2022-08-17 PROCEDURE — 77067 SCR MAMMO BI INCL CAD: CPT

## 2022-08-17 PROCEDURE — 73502 X-RAY EXAM HIP UNI 2-3 VIEWS: CPT | Mod: FY

## 2022-08-17 PROCEDURE — 82340 ASSAY OF CALCIUM IN URINE: CPT | Performed by: FAMILY MEDICINE

## 2022-08-17 PROCEDURE — 81050 URINALYSIS VOLUME MEASURE: CPT | Performed by: FAMILY MEDICINE

## 2022-08-17 NOTE — PROGRESS NOTES
Patient presents with:  Trauma: Patient hit the side of a ledge and injured her left hip on Sunday. Patient states she has a bruise and lump that she noticed last night.      Clinical Decision Making:  Patient had a traumatic hematoma on the left hip.  The ASIS of the pelvis was not involved.  X-rays were negative for avulsion or other bony injury.  Patient is treated with symptomatic care for resolution of the hematoma. Expected course of resolution and indication for return was gone over and questions were answered to patient/parent's satisfaction before discharge.        ICD-10-CM    1. Hematoma of skin  T14.8XXA XR Pelvis w Hip Left G/E 2 Views     CANCELED: XR Pelvis 1/2 Views   2. Contusion of left hip, initial encounter  S70.02XA XR Pelvis w Hip Left G/E 2 Views     CANCELED: XR Pelvis 1/2 Views       Patient Instructions   Ice topically to the area 20 minutes on each hour while awake.  Take precautions to avoid damage to the skin.  After 2 days, transition to ice topically 20 minutes 3 times per day.  After 2 days may add heat and alternate ice and heat.  Ibuprofen 600 mg (3 tablets) 3 times a day with food for analgesia and anti-inflammatory effect.  Do not use the ibuprofen if you have contraindications to using NSAIDs.  Return to see primary care provider or return to clinic for reexamination and chelo-ray in 2 weeks if anything less than 100% resolution or return to pain-free weightbearing and full activities.          HPI:  Francisca Williamson is a 67 year old female who presents today for evaluation of injury to the left hip.  Patient recounts past medical history for playing with her child and running into a countertop on the left hip.  She developed hard indurated area small area and bruising above the left hip.  Patient is still fully weightbearing does not have any difficulty with bone pain with full weightbearing on the left pelvic and femur.    History obtained from chart review and the  patient.    Problem List:  2021-11: IBS (irritable bowel syndrome)  2021-11: Chronic idiopathic constipation  2021-06: Obstructive defecation (H)  2021-06: Pelvic floor dysfunction in female  2021-02: Benign neoplasm of cecum  2021-02: Diverticular disease of large intestine  2021-02: Polyp of colon  2020-05: Osteopenia  2020-05: Diverticulosis of large intestine without hemorrhage  2019-09: Insomnia, idiopathic  2012-11: Type 2 diabetes mellitus without complication, without long-  term current use of insulin (H)  2012-11: Overweight  2012-11: Hyperlipidemia  2011-06: Family history of osteoporosis  2009-10: BPV (benign positional vertigo)  2009-07: B12 deficiency  2008-04: Vitamin D deficiency  2008-04: Gastroesophageal reflux disease  2008-04: Celiac disease  2005-01: Shingles  2004-01: Osteoporosis  2004-01: History of colonic polyps      Past Medical History:   Diagnosis Date     B12 deficiency      Celiac disease      Diverticulitis      Diverticulosis      GERD (gastroesophageal reflux disease)     states main problem is heartburn     High cholesterol      Osteoporosis      Shingles 1/1/2005       Social History     Tobacco Use     Smoking status: Never Smoker     Smokeless tobacco: Never Used   Substance Use Topics     Alcohol use: Yes     Alcohol/week: 7.0 standard drinks       Review of Systems  As above in HPI otherwise negative.    Vitals:    08/17/22 1257   BP: 113/78   Pulse: 68   Resp: 18   Temp: 98.3  F (36.8  C)   TempSrc: Oral   SpO2: 98%   Weight: 66.2 kg (146 lb)       General: Patient is resting comfortably no acute distress is afebrile  HEENT: Head is normocephalic atraumatic   eyes are PERRL EOMI sclera anicteric   Skin: With ecchymosis and a small 1 cm sized area of induration to palpation above the left hip.  No pain over the ASIS of the left hip. Patient is full weightbearing and nontender over the greater trochanter or the left femur.  On 817    Physical Exam      Labs:  Results for orders  placed or performed in visit on 08/17/22   XR Pelvis w Hip Left G/E 2 Views     Status: None    Narrative    EXAM: XR PELVIS AND HIP LEFT 2 VIEWS  LOCATION: [facility]  DATE/TIME: 8/17/2022 1:22 PM    INDICATION: traumatic hematoma to the left hip, left hip pain.  COMPARISON: None.      Impression    IMPRESSION: No fracture. Mild degenerative changes in the left hip and moderate degenerative changes in the right hip.                   At the end of the encounter, I discussed results, diagnosis, medications. Discussed red flags for immediate return to clinic/ER, as well as indications for follow up if no improvement. Patient understood and agreed to plan. Patient was stable for discharge.

## 2022-08-17 NOTE — PATIENT INSTRUCTIONS
Ice topically to the area 20 minutes on each hour while awake.  Take precautions to avoid damage to the skin.  After 2 days, transition to ice topically 20 minutes 3 times per day.  After 2 days may add heat and alternate ice and heat.  Ibuprofen 600 mg (3 tablets) 3 times a day with food for analgesia and anti-inflammatory effect.  Do not use the ibuprofen if you have contraindications to using NSAIDs.  Return to see primary care provider or return to clinic for reexamination and chelo-ray in 2 weeks if anything less than 100% resolution or return to pain-free weightbearing and full activities.

## 2022-09-30 ENCOUNTER — OFFICE VISIT (OUTPATIENT)
Dept: FAMILY MEDICINE | Facility: CLINIC | Age: 67
End: 2022-09-30
Payer: COMMERCIAL

## 2022-09-30 VITALS
BODY MASS INDEX: 25.95 KG/M2 | SYSTOLIC BLOOD PRESSURE: 119 MMHG | OXYGEN SATURATION: 97 % | HEART RATE: 64 BPM | TEMPERATURE: 97.7 F | DIASTOLIC BLOOD PRESSURE: 81 MMHG | WEIGHT: 153.1 LBS | RESPIRATION RATE: 16 BRPM

## 2022-09-30 DIAGNOSIS — S39.012A STRAIN OF MUSCLE, FASCIA AND TENDON OF LOWER BACK, INITIAL ENCOUNTER: Primary | ICD-10-CM

## 2022-09-30 PROCEDURE — 99213 OFFICE O/P EST LOW 20 MIN: CPT | Performed by: PHYSICIAN ASSISTANT

## 2022-09-30 RX ORDER — CYCLOBENZAPRINE HCL 5 MG
5 TABLET ORAL
Qty: 5 TABLET | Refills: 0 | Status: SHIPPED | OUTPATIENT
Start: 2022-09-30 | End: 2022-10-05

## 2022-09-30 NOTE — PROGRESS NOTES
"URGENT CARE VISIT:    SUBJECTIVE:   Francisca Williamson is a 67 year old female who presents for evaluation of back pain  Symptoms began 3 day(s) ago, have been onset gradual and are worse.  Pain is located in the low back bilateral region, with radiation to does not radiate, and are at worst a 6 on a scale of 1-10.  Pain is exacerbated by: standing and changing position. She states getting out of bed in the morning is the worst. As she moves through the day it seems to get better.   Pain is relieved by: activity. Associated symptoms include: lower extremity numbness bilateral back of thighs. Denies any fever, unintentional weight loss,bladder urgency, bladder incontinence and bowel incontinence. Recent injury:recent heavy lifting and gardening on Monday.  Personal hx of back pain is recurrent self limited episodes of low back pain in the past.     PMH:   Past Medical History:   Diagnosis Date     B12 deficiency      Celiac disease      Diverticulitis      Diverticulosis      GERD (gastroesophageal reflux disease)     states main problem is heartburn     High cholesterol      Osteoporosis      Shingles 1/1/2005     Allergies: [unfilled]   Medications:   Current Outpatient Medications   Medication Sig Dispense Refill     BD SYRINGE SLIP TIP 25G X 5/8\" 1 ML MISC FOR HOME  each 0     cyanocobalamin (CYANOCOBALAMIN) 1000 MCG/ML injection INJECT 1ML(1,000MCG TOTAL) INTO THE SHOULDER, THIGH, OR BUTTOCKS EVERY 30 DAYS 3 mL 0     diltiazem 2% in PLO gel Apply pea sized amount 2 times daily as needed 30 g 0     estradiol (ESTRACE) 0.1 MG/GM vaginal cream Place vaginally three times a week 42.5 g 1     hyoscyamine (LEVSIN) 0.125 MG tablet TAKE 1 TABLET BY MOUTH UP TO FOUR TIMES DAILY AS NEEDED FOR SPASMS       Lactobacillus Rhamnosus, GG, ( PROBIOTIC DIGESTIVE CARE) CAPS Take 1 capsule by mouth       latanoprost (XALATAN) 0.005 % ophthalmic solution INT 1 GTT IN EACH EYE ONCE D       METAMUCIL FIBER PO        metroNIDAZOLE " (METROCREAM) 0.75 % external cream Apply topically 2 times daily 45 g 1     polyethylene glycol (MIRALAX) 17 GM/Dose powder Take 17 g by mouth       pravastatin (PRAVACHOL) 20 MG tablet TAKE 1 TABLET(20 MG) BY MOUTH DAILY 90 tablet 0     valACYclovir (VALTREX) 500 MG tablet TAKE 1 TABLET(500 MG) BY MOUTH TWICE DAILY FOR 3 DAYS 6 tablet 3     VITAMIN D PO Take 2,000 Units by mouth       Social History:   Social History     Tobacco Use     Smoking status: Never Smoker     Smokeless tobacco: Never Used   Substance Use Topics     Alcohol use: Yes     Alcohol/week: 7.0 standard drinks       ROS: ROS otherwise found to be negative except as noted above.     OBJECTIVE:  /81 (BP Location: Left arm, Patient Position: Sitting, Cuff Size: Adult Regular)   Pulse 64   Temp 97.7  F (36.5  C) (Oral)   Resp 16   Wt 69.4 kg (153 lb 1.6 oz)   SpO2 97%   BMI 25.95 kg/m    General: WDWN in NAD.   Cardiac: RRR without murmurs, rubs, or gallops.  Respiratory: LCTAB without adventitious sounds. Non-labored breathing.  Musculoskeletal: Ambulating with/out difficulty. Back symmetric, no curvature. ROM normal. No CVA tenderness., negative findings: symmetric, no kyphosis present, no evidence of scoliosis, no skin lesions, erythema, or scars, positive findings: limitation of motion - flexion: Moderate, extension: Mild, rotation: Mild and lateral bending: Mild, tenderness to palpation mild parasacral tenderness. No vertebral tenderness. No CVA tenderness. Straight leg raise test: negative  Neurological: Normal strength and tone with no weakness or sensory deficit noted, reflexes normal.     ASSESSMENT:    ICD-10-CM    1. Strain of muscle, fascia and tendon of lower back, initial encounter  S39.012A Spine  Referral     cyclobenzaprine (FLEXERIL) 5 MG tablet         PLAN:  See patient instructions    Patient verbalized understanding and is agreeable to plan. The patient was discharged ambulatory and in stable  condition.    CHANCE Israel...................  9/30/2022   1:55 PM

## 2022-10-03 DIAGNOSIS — E53.8 B12 DEFICIENCY: ICD-10-CM

## 2022-10-03 RX ORDER — CYANOCOBALAMIN 1000 UG/ML
INJECTION, SOLUTION INTRAMUSCULAR; SUBCUTANEOUS
Qty: 3 ML | Refills: 3 | Status: SHIPPED | OUTPATIENT
Start: 2022-10-03 | End: 2023-06-02

## 2022-10-03 NOTE — TELEPHONE ENCOUNTER
"Last Written Prescription Date:  7/9/22  Last Fill Quantity: 3,  # refills: 0   Last office visit provider:  8/4/22     Requested Prescriptions   Pending Prescriptions Disp Refills     cyanocobalamin (CYANOCOBALAMIN) 1000 MCG/ML injection [Pharmacy Med Name: CYANOCOBALAMIN 1000MCG/ML INJ, 1ML] 3 mL 0     Sig: INJECT 1 ML INTO THE SHOULDER, THIGH, OR BUTTOCKS, EVERY 30 DAYS       Vitamin Supplements (Adult) Protocol Passed - 10/3/2022 10:08 AM        Passed - High dose Vitamin D not ordered        Passed - Recent (12 mo) or future (30 days) visit within the authorizing provider's specialty     Patient has had an office visit with the authorizing provider or a provider within the authorizing providers department within the previous 12 mos or has a future within next 30 days. See \"Patient Info\" tab in inbasket, or \"Choose Columns\" in Meds & Orders section of the refill encounter.              Passed - Medication is active on med list             Reva Arzate RN 10/03/22 5:20 PM  "

## 2022-10-07 ENCOUNTER — OFFICE VISIT (OUTPATIENT)
Dept: FAMILY MEDICINE | Facility: CLINIC | Age: 67
End: 2022-10-07
Payer: COMMERCIAL

## 2022-10-07 VITALS
WEIGHT: 154.8 LBS | BODY MASS INDEX: 26.23 KG/M2 | RESPIRATION RATE: 16 BRPM | HEART RATE: 76 BPM | DIASTOLIC BLOOD PRESSURE: 59 MMHG | SYSTOLIC BLOOD PRESSURE: 111 MMHG

## 2022-10-07 DIAGNOSIS — S39.012D STRAIN OF MUSCLE, FASCIA AND TENDON OF LOWER BACK, SUBSEQUENT ENCOUNTER: Primary | ICD-10-CM

## 2022-10-07 DIAGNOSIS — R20.0 NUMBNESS IN BOTH LEGS: ICD-10-CM

## 2022-10-07 PROCEDURE — G0008 ADMIN INFLUENZA VIRUS VAC: HCPCS | Performed by: FAMILY MEDICINE

## 2022-10-07 PROCEDURE — 90662 IIV NO PRSV INCREASED AG IM: CPT | Performed by: FAMILY MEDICINE

## 2022-10-07 PROCEDURE — 99213 OFFICE O/P EST LOW 20 MIN: CPT | Mod: 25 | Performed by: FAMILY MEDICINE

## 2022-10-07 RX ORDER — CYCLOBENZAPRINE HCL 5 MG
5 TABLET ORAL 3 TIMES DAILY PRN
Qty: 30 TABLET | Refills: 1 | Status: SHIPPED | OUTPATIENT
Start: 2022-10-07

## 2022-10-07 NOTE — PROGRESS NOTES
Assessment & Plan     Strain of muscle, fascia and tendon of lower back, subsequent encounter  Will treat with flexeril and send to PT. No imaging indicated.   - cyclobenzaprine (FLEXERIL) 5 MG tablet  Dispense: 30 tablet; Refill: 1  - Physical Therapy Referral    Numbness in both legs  No neurological. Very mild symptoms. Monitor for now. Has referral to spine. No red flag symptoms.   - cyclobenzaprine (FLEXERIL) 5 MG tablet  Dispense: 30 tablet; Refill: 1  - Physical Therapy Referral        Return in about 3 months (around 1/7/2023) for Routine preventive, with me, in person.    Dorota Horne MD  [facility]    Ken Espinosa is a 67 year old, presenting for the following health issues:  Back Pain (Follow up [facility] 9/3/20-strain of muscle, fascia and tendon of lower back, finished cyclobenzaprine. Having numbess/tingling in lower back radiating down bilateral legs)      History of Present Illness       Back Pain:  She presents for follow up of back pain. Patient's back pain is a recurring problem.  Location of back pain:  Right lower back, left lower back, right middle of back, left middle of back, right side of waist and left side of waist  Description of back pain: dull ache and other  Back pain spreads: right buttocks, left buttocks, right thigh and left thigh    Since patient first noticed back pain, pain is: gradually improving  Does back pain interfere with her job:  Not applicable      She eats 4 or more servings of fruits and vegetables daily.She consumes 1 sweetened beverage(s) daily.She exercises with enough effort to increase her heart rate 30 to 60 minutes per day.  She exercises with enough effort to increase her heart rate 7 days per week.   She is taking medications regularly.        Followup:    Facility: [facility]  Date of visit: 09/03/22  Reason for visit: Strain of muscle, fascia and tendon of lower back  Current Status: Having some  numbness and tingling in middle of back radiating down both legs.    Started 2 weeks ago, was gardening and next morning didn't get out of bed because of back bed. Went to [facility] in 9/3/22 and was told it was muscular strain.     Review of Systems   Constitutional, HEENT, cardiovascular, pulmonary, gi and gu systems are negative, except as otherwise noted.      Objective    /59 (BP Location: Left arm, Patient Position: Sitting, Cuff Size: Adult Regular)   Pulse 76   Resp 16   Wt 70.2 kg (154 lb 12.8 oz)   BMI 26.23 kg/m    Body mass index is 26.23 kg/m .  Physical Exam   GENERAL: healthy, alert and no distress  MS: Back: No excessive lordosises or kyphosis. Tender to midline lumbar column. Non-tender to palpation of  SI joints or paraspinsus muscles. Decreased lumbar flexion and extension.  Sensation grossly intact in bilateral lower extremities. 5/5 strength in hip, knee, and ankle flexion and extension.  NEURO: Normal strength and tone, mentation intact and speech normal  PSYCH: mentation appears normal, affect normal/bright

## 2022-10-08 ENCOUNTER — OFFICE VISIT (OUTPATIENT)
Dept: FAMILY MEDICINE | Facility: CLINIC | Age: 67
End: 2022-10-08
Payer: COMMERCIAL

## 2022-10-08 VITALS
BODY MASS INDEX: 26.23 KG/M2 | RESPIRATION RATE: 16 BRPM | SYSTOLIC BLOOD PRESSURE: 131 MMHG | HEART RATE: 83 BPM | WEIGHT: 154.8 LBS | OXYGEN SATURATION: 97 % | DIASTOLIC BLOOD PRESSURE: 79 MMHG

## 2022-10-08 DIAGNOSIS — L03.114 CELLULITIS OF LEFT UPPER EXTREMITY: Primary | ICD-10-CM

## 2022-10-08 PROCEDURE — 99213 OFFICE O/P EST LOW 20 MIN: CPT | Performed by: FAMILY MEDICINE

## 2022-10-08 RX ORDER — DOXYCYCLINE HYCLATE 100 MG
100 TABLET ORAL 2 TIMES DAILY
Qty: 10 TABLET | Refills: 0 | Status: SHIPPED | OUTPATIENT
Start: 2022-10-08 | End: 2022-10-13

## 2022-10-08 NOTE — PROGRESS NOTES
Patient presents with:  Allergic Reaction: Had a flu shot yesterday and now injection site is red, concern about cellulitis      Clinical Decision Making:      ICD-10-CM    1. Cellulitis of left upper extremity  L03.114 doxycycline hyclate (VIBRA-TABS) 100 MG tablet     67-year-old here with 1 day history of swelling and redness and pain in the left upper deltoid region after influenza vaccination yesterday.  Exam consistent with cellulitis.  Doxycycline started.  She did not tolerate Keflex or clindamycin previously.  Return to walk-in clinic or seeing PCP as needed in next 3-5 days in not improving.     There are no Patient Instructions on file for this visit.    HPI:  Francisca Williamson is a 67 year old female who presents today complaining of   Chief Complaint   Patient presents with     Allergic Reaction     Had a flu shot yesterday and now injection site is red, concern about cellulitis       History obtained from the patient.    Problem List:  2021-11: IBS (irritable bowel syndrome)  2021-11: Chronic idiopathic constipation  2021-06: Obstructive defecation (H)  2021-06: Pelvic floor dysfunction in female  2021-02: Benign neoplasm of cecum  2021-02: Diverticular disease of large intestine  2021-02: Polyp of colon  2020-05: Osteopenia  2020-05: Diverticulosis of large intestine without hemorrhage  2019-09: Insomnia, idiopathic  2012-11: Type 2 diabetes mellitus without complication, without long-  term current use of insulin (H)  2012-11: Overweight  2012-11: Hyperlipidemia  2011-06: Family history of osteoporosis  2009-10: BPV (benign positional vertigo)  2009-07: B12 deficiency  2008-04: Vitamin D deficiency  2008-04: Gastroesophageal reflux disease  2008-04: Celiac disease  2005-01: Shingles  2004-01: Osteoporosis  2004-01: History of colonic polyps      Past Medical History:   Diagnosis Date     B12 deficiency      Celiac disease      Diverticulitis      Diverticulosis      GERD (gastroesophageal reflux disease)      states main problem is heartburn     High cholesterol      Osteoporosis      Shingles 1/1/2005       Social History     Tobacco Use     Smoking status: Never Smoker     Smokeless tobacco: Never Used   Substance Use Topics     Alcohol use: Yes     Alcohol/week: 7.0 standard drinks       Review of Systems  Constitutional, HEENT, cardiovascular, pulmonary, gi and gu systems are negative, except as otherwise noted.    Vitals:    10/08/22 1619   BP: 131/79   Pulse: 83   Resp: 16   SpO2: 97%   Weight: 70.2 kg (154 lb 12.8 oz)       Physical Exam  GENERAL: healthy, alert and no distress  SKIN: a ring of erythema expanding from site of influenza injection about 10 cm in diameter, tender.   NEURO: Normal strength and tone, mentation intact and speech normal  PSYCH: mentation appears normal, affect normal/bright    Results:  No results found for any visits on 10/08/22.      At the end of the encounter, I discussed results, diagnosis, medications. Discussed red flags for immediate return to clinic/ER, as well as indications for follow up if no improvement. Patient understood and agreed to plan. Patient was stable for discharge.

## 2022-10-12 ENCOUNTER — MYC MEDICAL ADVICE (OUTPATIENT)
Dept: FAMILY MEDICINE | Facility: CLINIC | Age: 67
End: 2022-10-12

## 2022-10-12 DIAGNOSIS — L03.114 CELLULITIS OF LEFT UPPER EXTREMITY: ICD-10-CM

## 2022-10-13 RX ORDER — DOXYCYCLINE HYCLATE 100 MG
100 TABLET ORAL 2 TIMES DAILY
Qty: 10 TABLET | Refills: 0 | Status: SHIPPED | OUTPATIENT
Start: 2022-10-13 | End: 2022-10-26

## 2022-10-13 NOTE — TELEPHONE ENCOUNTER
See Mychart message from today 10/13. Closing this encounter.    Harsha Coleman, RN, BSN  [facility]

## 2022-10-15 ENCOUNTER — NURSE TRIAGE (OUTPATIENT)
Dept: NURSING | Facility: CLINIC | Age: 67
End: 2022-10-15

## 2022-10-15 ENCOUNTER — MYC MEDICAL ADVICE (OUTPATIENT)
Dept: FAMILY MEDICINE | Facility: CLINIC | Age: 67
End: 2022-10-15

## 2022-10-15 ENCOUNTER — TELEPHONE (OUTPATIENT)
Dept: FAMILY MEDICINE | Facility: CLINIC | Age: 67
End: 2022-10-15

## 2022-10-16 NOTE — TELEPHONE ENCOUNTER
Nurse Triage SBAR    Situation: ABX - cellulitis - side effects    Background: Patient calling. On Doxycycline for cellulitis. Flu shot on 10/7/2022. Provider ordered another round on 10/13/2022. 14 dosages.     Assessment: Abdominal discomfort - started on  Thursday as on and off around noon time after taking the ABX. Constantly there since noon. Irritable bowel and constipation. Sensitivity in corners of mouth. Takes ABX with food. Pain 4-5/10. Takes probiotic 30 billion once a day. Vaginal discomfort - seems more dry - use estrogen. No discharge. Doesn't itch. Some burning sensation. Denied having a fever.     Protocol Recommended Disposition: See Physician within 4 hours (Or PCP Triage)    Recommendation: According to the protocol, Patient should be seen within 4 hours (Or PCP Triage). Advised Patient to wait for a call-back after nurse speaks with the on-call Provider. Care advice given. Patient verbalizes understanding and agrees with plan of care.     Paging on call Dr Garvin at 7:51pm. Per MD - Patient should stop the doxycycline. Stay off it until Monday and follow up on monday in regards to her symptoms and cellulitis. If abdominal pain gets worse tonight or tomorrow then go into ED. If its getting better then be seen on Monday.    Provider Recommendation Follow Up:   Reached patient/caregiver. Informed of provider's recommendations. Patient verbalized understanding and agrees with the plan. Reviewed concerning symptoms and when to call back. Connected with scheduling.     No available appointments that the patient is able to travel to. PCP or covering provider please advise.     Karie Malone RN Nursing Advisor 10/15/2022 8:16 PM     Reason for Disposition    [1] MILD-MODERATE pain AND [2] constant AND [3] present > 2 hours    Additional Information    Negative: Shock suspected (e.g., cold/pale/clammy skin, too weak to stand, low BP, rapid pulse)    Negative: Difficult to awaken or acting confused (e.g.,  "disoriented, slurred speech)    Negative: Passed out (i.e., lost consciousness, collapsed and was not responding)    Negative: Sounds like a life-threatening emergency to the triager    Negative: Chest pain    Negative: [1] Abdominal pain AND [2] postpartum (from 0 to 6 weeks after delivery)    Negative: [1] Abdominal pain AND [2] pregnant < 20 weeks    Negative: [1] Abdominal pain AND [2] pregnant 20 or more weeks    Negative: Pain is mainly in upper abdomen  (if needed ask: \"is it mainly above the belly button?\")    Negative: Followed an abdomen (stomach) injury    Negative: [1] SEVERE pain (e.g., excruciating) AND [2] present > 1 hour    Negative: [1] SEVERE pain AND [2] age > 60 years    Negative: [1] Vomiting AND [2] contains red blood or black (\"coffee ground\") material  (Exception: few red streaks in vomit that only happened once)    Negative: Blood in bowel movements (Exception: blood on surface of BM with constipation)    Negative: Black or tarry bowel movements (Exception: chronic-unchanged black-grey bowel movements AND is taking iron pills or Pepto-bismol)    Negative: Patient sounds very sick or weak to the triager    Protocols used: ABDOMINAL PAIN - FEMALE-A-AH      "

## 2022-10-16 NOTE — TELEPHONE ENCOUNTER
Reason for Call:  Same Day Appointment, Requested Provider:  PCP     PCP: Dorota Horne    Reason for visit: cellulitis f/u    Duration of symptoms: 1 week     Have you been treated for this in the past? yes    Additional comments: doesn't want to go to urgent care, isn't willing to travel     Can we leave a detailed message on this number? Yes     Phone number patient can be reached at: [phone]    Best Time: any    Call taken on 10/15/2022 at 8:10 PM by Dejuan alexander

## 2022-10-17 ENCOUNTER — HOSPITAL ENCOUNTER (OUTPATIENT)
Dept: PHYSICAL THERAPY | Facility: REHABILITATION | Age: 67
Discharge: HOME | End: 2022-10-17
Attending: FAMILY MEDICINE
Payer: COMMERCIAL

## 2022-10-17 DIAGNOSIS — S39.012D STRAIN OF MUSCLE, FASCIA AND TENDON OF LOWER BACK, SUBSEQUENT ENCOUNTER: ICD-10-CM

## 2022-10-17 DIAGNOSIS — R20.0 NUMBNESS IN BOTH LEGS: ICD-10-CM

## 2022-10-17 PROCEDURE — 97110 THERAPEUTIC EXERCISES: CPT | Mod: GP

## 2022-10-17 PROCEDURE — 97161 PT EVAL LOW COMPLEX 20 MIN: CPT | Mod: GP

## 2022-10-17 NOTE — TELEPHONE ENCOUNTER
Patient went to UR on 10/16. Please see related [employer] message encounter that was sent to PCP on 10/16 regarding this concern.    Will close this encounter.    Bradley Ling RN     [facility]

## 2022-10-17 NOTE — TELEPHONE ENCOUNTER
Per chart review, patient was seen in ED 10/16. Patient sent updated [employer] message 10/16. Will close this encounter.    Jennifer Cutler RN

## 2022-10-21 ENCOUNTER — OFFICE VISIT (OUTPATIENT)
Dept: FAMILY MEDICINE | Facility: CLINIC | Age: 67
End: 2022-10-21
Payer: COMMERCIAL

## 2022-10-21 VITALS
RESPIRATION RATE: 20 BRPM | WEIGHT: 149.6 LBS | SYSTOLIC BLOOD PRESSURE: 121 MMHG | TEMPERATURE: 96.3 F | DIASTOLIC BLOOD PRESSURE: 68 MMHG | HEART RATE: 70 BPM | BODY MASS INDEX: 25.35 KG/M2

## 2022-10-21 DIAGNOSIS — L03.114 CELLULITIS OF LEFT UPPER EXTREMITY: ICD-10-CM

## 2022-10-21 DIAGNOSIS — M25.522 PAIN IN JOINT INVOLVING UPPER ARM, LEFT: Primary | ICD-10-CM

## 2022-10-21 DIAGNOSIS — N28.89 KIDNEY SCARRING: ICD-10-CM

## 2022-10-21 DIAGNOSIS — N94.89 PELVIC CONGESTION SYNDROME: ICD-10-CM

## 2022-10-21 DIAGNOSIS — E11.9 TYPE 2 DIABETES MELLITUS WITHOUT COMPLICATION, WITHOUT LONG-TERM CURRENT USE OF INSULIN (H): ICD-10-CM

## 2022-10-21 DIAGNOSIS — R10.32 LLQ ABDOMINAL PAIN: ICD-10-CM

## 2022-10-21 LAB
ANION GAP SERPL CALCULATED.3IONS-SCNC: 10 MMOL/L (ref 7–15)
BUN SERPL-MCNC: 12.1 MG/DL (ref 8–23)
CALCIUM SERPL-MCNC: 10.1 MG/DL (ref 8.8–10.2)
CHLORIDE SERPL-SCNC: 102 MMOL/L (ref 98–107)
CK SERPL-CCNC: 84 U/L (ref 26–192)
CREAT SERPL-MCNC: 0.77 MG/DL (ref 0.51–0.95)
DEPRECATED HCO3 PLAS-SCNC: 26 MMOL/L (ref 22–29)
GFR SERPL CREATININE-BSD FRML MDRD: 83 ML/MIN/1.73M2
GLUCOSE SERPL-MCNC: 114 MG/DL (ref 70–99)
HBA1C MFR BLD: 5.6 % (ref 0–5.6)
POTASSIUM SERPL-SCNC: 4.3 MMOL/L (ref 3.4–5.3)
SODIUM SERPL-SCNC: 138 MMOL/L (ref 136–145)

## 2022-10-21 PROCEDURE — 99214 OFFICE O/P EST MOD 30 MIN: CPT | Performed by: FAMILY MEDICINE

## 2022-10-21 PROCEDURE — 36415 COLL VENOUS BLD VENIPUNCTURE: CPT | Performed by: FAMILY MEDICINE

## 2022-10-21 PROCEDURE — 82550 ASSAY OF CK (CPK): CPT | Performed by: FAMILY MEDICINE

## 2022-10-21 PROCEDURE — 80048 BASIC METABOLIC PNL TOTAL CA: CPT | Performed by: FAMILY MEDICINE

## 2022-10-21 PROCEDURE — 83036 HEMOGLOBIN GLYCOSYLATED A1C: CPT | Performed by: FAMILY MEDICINE

## 2022-10-21 NOTE — PROGRESS NOTES
[facility]    OUTPATIENT PHYSICAL THERAPY ORTHOPEDIC EVALUATION  PLAN OF TREATMENT FOR OUTPATIENT REHABILITATION  (COMPLETE FOR INITIAL CLAIMS ONLY)  Patient's Last Name, First Name, M.I.  YOB: 1955  Francisca Williamson    Provider s Name:  [facility]   Medical Record No.  [id number]   Start of Care Date:  10/17/22   Onset Date:  09/27/22   Type:     _X__PT   ___OT   ___SLP Medical Diagnosis:  Strain of muscle, fascia and tendon of lower back, subsequent encounter; numbness in both legs     PT Diagnosis:  Low back pain with radicular symptoms in BLE   Visits from SOC:  1      _________________________________________________________________________________  Plan of Treatment/Functional Goals:  balance training, gait training, joint mobilization, manual therapy, neuromuscular re-education, ROM, strengthening, stretching     Cryotherapy, Electrical stimulation, Hot packs, TENS, Traction     Goals  Goal Identifier: HEP  Goal Description: Pt will be independent with HEP to improve mobility and decrease pain.  Target Date: 01/14/23    Goal Identifier: Lifting  Goal Description: Pt will report ability to lift heavy objects, greater than 30lbs, with less than 1/10 pain to improve functional mobility and increase tolerance completing ADL's, recreational activities and work related tasks.  Target Date: 01/14/23    Goal Identifier: Walking  Goal Description: Pt will report ability to walk greater than 1 mile with less than 1/10 pain to improve functional mobility and increase tolerance completing ADL's, recreational activities and work related tasks.  Target Date: 01/14/23                                                           Therapy Frequency:  1 time/week  Predicted Duration of Therapy Intervention:  1 time a week for minimum of 6 weeks, minimum of 6 sessions    Althea ANGELO  Jeyson PT                 I CERTIFY THE NEED FOR THESE SERVICES FURNISHED UNDER        THIS PLAN OF TREATMENT AND WHILE UNDER MY CARE     (Physician co-signature of this document indicates review and certification of the therapy plan).                     Certification Date From:  10/17/22   Certification Date To:  01/14/23    Referring Provider:  Dorota Horne MD    Initial Assessment        See Epic Evaluation Start of Care Date: 10/17/22

## 2022-10-21 NOTE — PROGRESS NOTES
Assessment & Plan     Pain in joint involving upper arm, left  Cellulitis of left upper extremity  Cellulitis has resolved.  This was from influenza shot in the left upper arm.  Still mild tightness. Will check CK.  Advised elevation icing and Tylenol as needed.  - CK total    LLQ abdominal pain  Review urgency room visit.  Abdominal CT showed possible ileus with inflammation and fluid-filled bowel.  Patient is improving.  Passing flatus and having bowel movement.  Continue to observe for now.  Put doxycycline on allergies list for intolerance.  - Basic metabolic panel  (Ca, Cl, CO2, Creat, Gluc, K, Na, BUN)    Pelvic congestion syndrome  Seen on CT abdomen/pelvis. Present on prior study. F/up in 3 months if ongoing symptoms will repeat CT and send to interventional radiology.     Kidney scarring  Seen on CT abdomen/pelvis. Monitor for now.  Follow-up in 3 months and consider kidney ultrasound    Type 2 diabetes mellitus without complication, without long-term current use of insulin (H)  Due for lab.  - Hemoglobin A1c        Return in about 3 months (around 1/21/2023) for Follow up, with me, in person.    Dorota Horne MD  [facility]    Ken Espinosa is a 67 year old, presenting for the following health issues:  Follow Up ([facility] 10/16/22 Abdominal Pain LLQ)      HPI   Chief Complaint   Patient presents with     Follow Up     [facility] 10/16/22 Abdominal Pain LLQ       ED/UC Followup:    Facility:  [facility]  Date of visit: 10/16/22  Reason for visit: Abdominal Pain LLQ   Current Status: Still having achy pain in abdomen LLQ, stopped doxycycline x 1 week ago     Reports having left upper arm tightness.  But swelling and pain have improved.  Was on doxycycline for 7 days in total.  Review urgency room visit on 10/16/2022 and review lab work and imaging results.    Review of Systems   Constitutional, HEENT, cardiovascular, pulmonary, gi and gu  systems are negative, except as otherwise noted.      Objective    /68 (BP Location: Left arm, Patient Position: Sitting, Cuff Size: Adult Regular)   Pulse 70   Temp (!) 96.3  F (35.7  C) (Oral)   Resp 20   Wt 67.9 kg (149 lb 9.6 oz)   BMI 25.35 kg/m    Body mass index is 25.35 kg/m .  Physical Exam   GENERAL: healthy, alert and no distress  NECK: no adenopathy, no asymmetry, masses, or scars and thyroid normal to palpation  RESP: lungs clear to auscultation - no rales, rhonchi or wheezes  CV: regular rate and rhythm, normal S1 S2, no S3 or S4, no murmur, click or rub, no peripheral edema and peripheral pulses strong  ABDOMEN: soft, tender to palpation LLQ, no hepatosplenomegaly, no masses and bowel sounds normal  MS: no gross musculoskeletal defects noted, no edema  SKIN: no suspicious lesions or rashes  NEURO: Normal strength and tone, mentation intact and speech normal  PSYCH: mentation appears normal, affect normal/bright

## 2022-10-21 NOTE — PROGRESS NOTES
10/17/22 7520   General Information   Type of Visit Initial OP Ortho PT Evaluation   Start of Care Date 10/17/22   Referring Physician Dorota Horne MD   Patient/Family Goals Statement to learn prevention efforts related to back injury   Orders Evaluate and Treat   Date of Order 10/07/22   Certification Required? Yes   Medical Diagnosis Strain of muscle, fascia and tendon of lower back, subsequent encounter; numbness in both legs   Surgical/Medical history reviewed Yes   Precautions/Limitations no known precautions/limitations   Weight-Bearing Status - LUE full weight-bearing   Weight-Bearing Status - RUE full weight-bearing   Weight-Bearing Status - LLE full weight-bearing   Weight-Bearing Status - RLE full weight-bearing   General Information Comments PSH: appendectomy; PMH: cellulitis, colitis   Body Part(s)   Body Part(s) Lumbar Spine/SI   Presentation and Etiology   Pertinent history of current problem (include personal factors and/or comorbidities that impact the POC) Pt reports that she has had back pain for years ever since she got a back injury. Pt reports that she has had times off and on of low back pain. Pt reports that she was gardening about a month ago and had back pain thee next day. Pt reports that she was unable to get out of bed. Pt reports that she went to urgent care and was given a muscle relaxer. Pt reports that she recently had abdominal pain and went to urgent care. Pt reports that she also had cellulitis in her L arm after receiving her flu shot. Pt reports that she tries to walk everyday, but is limited due to pain. Pt reports numbness and tingling in back of both legs, very mild currently, that goes down into her calves/shins.   Impairments A. Pain;D. Decreased ROM;E. Decreased flexibility;F. Decreased strength and endurance;G. Impaired balance;H. Impaired gait;K. Numbness;L. Tingling   Functional Limitations perform activities of daily living;perform required work  activities;perform desired leisure / sports activities   Symptom Location Center of low back with numbness/tingling down BLE   How/Where did it occur While carrying an object;While lifting;Other  (After gardening, September 27, 2022)   Onset date of current episode/exacerbation 09/27/22   Chronicity Chronic   Pain rating (0-10 point scale) Best (/10);Worst (/10)  (Current: 2-3/10)   Best (/10) 0/10   Worst (/10) 8-9/10   Pain quality B. Dull;C. Aching   Frequency of pain/symptoms B. Intermittent   Pain/symptoms are: Worse in the morning   Pain/symptoms exacerbated by C. Lifting;D. Carrying;I. Bending;A. Sitting   Pain/symptoms eased by I. OTC medication(s)  (muscle relaxers)   Progression of symptoms since onset: Worsened   Prior Level of Function   Prior Level of Function-Mobility Independent   Prior Level of Function-ADLs Independent   Functional Level Prior Comment Independent   Current Level of Function   Patient role/employment history F. Retired   Current equipment-Gait/Locomotion None   Fall Risk Screen   Fall screen completed by PT   Have you fallen 2 or more times in the past year? No   Have you fallen and had an injury in the past year? No   Is patient a fall risk? No   Abuse Screen (yes response referral indicated)   Feels Unsafe at Home or Work/School no   Feels Threatened by Someone no   Does Anyone Try to Keep You From Having Contact with Others or Doing Things Outside Your Home? no   Physical Signs of Abuse Present no   System Outcome Measures   Outcome Measures   (Oswestry: 10% disability)   Lumbar Spine/SI Objective Findings   Gait/Locomotion No deviations noted   Hamstring Flexibility Limited   Hip Flexor Flexibility Limited   Quadricep Flexibility Limited   Piriformis Flexibility Limited   Flexion ROM 75%, pressure in lower back   Extension ROM 25%, reports feels good   Right Side Bending ROM 50%   Left Side Bending ROM 50%, feel pain on L   Lumbar ROM Comment L and R rotation: 50%, intermittent  pain   Hip Screen L scour positive   Hip Flexion (L2) Strength B: 4/5   Knee Flexion Strength B: 4/5   Knee Extension (L3) Strength L: 4/5, R: 4-/5, pain   Ankle Dorsiflexion (L4) Strength B: 4+/5   SLR Negative   Posture Normal   Planned Therapy Interventions   Planned Therapy Interventions balance training;gait training;joint mobilization;manual therapy;neuromuscular re-education;ROM;strengthening;stretching   Planned Modality Interventions   Planned Modality Interventions Cryotherapy;Electrical stimulation;Hot packs;TENS;Traction   Clinical Impression   Criteria for Skilled Therapeutic Interventions Met yes, treatment indicated   PT Diagnosis Low back pain with radicular symptoms in BLE   Influenced by the following impairments Decreased lumbar ROM, decreased strength, limited flexibility, pain   Functional limitations due to impairments Impaired functional mobility, decreased tolerance completing ADL's, recreational activities and work related tasks   Clinical Presentation Stable/Uncomplicated   Clinical Decision Making (Complexity) Low complexity   Therapy Frequency 1 time/week   Predicted Duration of Therapy Intervention (days/wks) 1 time a week for minimum of 6 weeks, minimum of 6 sessions   Risk & Benefits of therapy have been explained Yes   Patient, Family & other staff in agreement with plan of care Yes   Clinical Impression Comments Pt is a 67 year old female who presents to physical therapy with low back pain with radicular symptoms in BLE. Pt has decreased lumbar ROM limited due to pain. Pt has decreased strength and limited flexiblity. Pt scored 10% disability on Oswestry Questionnaire. Pt would benefit from physical therapy to improve ROM, increase strength and decrease pain to improve functional mobility and increase tolerance completing ADL's, recreational activities and work related tasks.   ORTHO GOALS   PT Ortho Eval Goals 1;2;3   Ortho Goal 1   Goal Identifier HEP   Goal Description Pt will be  independent with HEP to improve mobility and decrease pain.   Target Date 01/14/23   Ortho Goal 2   Goal Identifier Lifting   Goal Description Pt will report ability to lift heavy objects, greater than 30lbs, with less than 1/10 pain to improve functional mobility and increase tolerance completing ADL's, recreational activities and work related tasks.   Target Date 01/14/23   Ortho Goal 3   Goal Identifier Walking   Goal Description Pt will report ability to walk greater than 1 mile with less than 1/10 pain to improve functional mobility and increase tolerance completing ADL's, recreational activities and work related tasks.   Target Date 01/14/23   Total Evaluation Time   PT Eval, Low Complexity Minutes (79510) 20   Therapy Certification   Certification date from 10/17/22   Certification date to 01/14/23   Medical Diagnosis Strain of muscle, fascia and tendon of lower back, subsequent encounter; numbness in both legs     Althea Benítez, PT, DPT

## 2022-10-22 ENCOUNTER — OFFICE VISIT (OUTPATIENT)
Dept: FAMILY MEDICINE | Facility: CLINIC | Age: 67
End: 2022-10-22
Payer: COMMERCIAL

## 2022-10-22 VITALS
RESPIRATION RATE: 18 BRPM | TEMPERATURE: 97.9 F | BODY MASS INDEX: 24.24 KG/M2 | HEART RATE: 81 BPM | DIASTOLIC BLOOD PRESSURE: 74 MMHG | OXYGEN SATURATION: 97 % | WEIGHT: 143 LBS | SYSTOLIC BLOOD PRESSURE: 115 MMHG

## 2022-10-22 DIAGNOSIS — J02.9 ACUTE PHARYNGITIS, UNSPECIFIED ETIOLOGY: Primary | ICD-10-CM

## 2022-10-22 DIAGNOSIS — Z20.818 STREP THROAT EXPOSURE: ICD-10-CM

## 2022-10-22 LAB
DEPRECATED S PYO AG THROAT QL EIA: NEGATIVE
GROUP A STREP BY PCR: NOT DETECTED

## 2022-10-22 PROCEDURE — 87651 STREP A DNA AMP PROBE: CPT | Performed by: PHYSICIAN ASSISTANT

## 2022-10-22 PROCEDURE — 99214 OFFICE O/P EST MOD 30 MIN: CPT | Mod: CS | Performed by: PHYSICIAN ASSISTANT

## 2022-10-22 PROCEDURE — U0005 INFEC AGEN DETEC AMPLI PROBE: HCPCS | Performed by: PHYSICIAN ASSISTANT

## 2022-10-22 PROCEDURE — U0003 INFECTIOUS AGENT DETECTION BY NUCLEIC ACID (DNA OR RNA); SEVERE ACUTE RESPIRATORY SYNDROME CORONAVIRUS 2 (SARS-COV-2) (CORONAVIRUS DISEASE [COVID-19]), AMPLIFIED PROBE TECHNIQUE, MAKING USE OF HIGH THROUGHPUT TECHNOLOGIES AS DESCRIBED BY CMS-2020-01-R: HCPCS | Performed by: PHYSICIAN ASSISTANT

## 2022-10-22 NOTE — PROGRESS NOTES
Patient presents with:  Pharyngitis: Patient was around her grand kids with strep last tuesady and wants to check herself. Patient thinks possible strep because sore throat a little.      Clinical Decision Making:  Patient was concerned that she had exposure to her granddaughter that had been positive for strep.  She was symptomatic. Strep test was obtained and was negative.  Culture is to follow.  Symptomatic care was gone over. Expected course of resolution and indication for return was gone over and questions were answered to patient/parent's satisfaction before discharge.        ICD-10-CM    1. Acute pharyngitis, unspecified etiology  J02.9 Symptomatic; Unknown COVID-19 Virus (Coronavirus) by PCR Nose     Streptococcus A Rapid Screen w/Reflex to PCR - Clinic Collect     Group A Streptococcus PCR Throat Swab      2. Strep throat exposure  Z20.818           Patient Instructions     Suggested increased rest increased fluids and bedside humidification  Over-the-counter Tylenol for comfort.  Follow packaging directions  Over-the-counter throat lozenges with benzocaine such as Cepacol may be used if indicated and is not a choking hazard based on age.  Follow packaging directions.  Do not overuse the benzocaine as it will dry the throat and make it uncomfortable.  Follow up with primary care provider if you do not get resolution with the course of treatment.  Return to walk-in care if complication or new symptoms arise in the interim.            Self-Care for Sore Throats  Sore throats happen for many reasons, such as colds, allergies, and infections caused by viruses or bacteria. In any case, your throat becomes red and sore. Your goal for self-care is to reduce your discomfort while giving your throat a chance to heal.    Moisten and soothe your throat  Tips include the following:    Try a sip of water first thing after waking up.    Keep your throat moist by drinking 6 or more glasses of clear liquids every  day.    Run a cool-air humidifier in your room overnight.    Avoid cigarette smoke.     Suck on throat lozenges, cough drops, hard candy, ice chips, or frozen fruit-juice bars. Use the sugar-free versions if your diet or medical condition requires them.  Gargle to ease irritation  Gargling every hour or 2 can ease irritation. Try gargling with 1 of these solutions:    1/4 teaspoon of salt in 1/2 cup of warm water    An over-the-counter anesthetic gargle  Use medicine for more relief  Over-the-counter medicine can reduce sore throat symptoms. Ask your pharmacist if you have questions about which medicine to use:    Ease pain with anesthetic sprays. Aspirin or an aspirin substitute also helps. Remember, never give aspirin to anyone 18 or younger, or if you are already taking blood thinners.     For sore throats caused by allergies, try antihistamines to block the allergic reaction.    Remember: unless a sore throat is caused by a bacterial infection, antibiotics won t help you.  Prevent future sore throats  Prevention tips include the following:    Stop smoking or reduce contact with secondhand smoke. Smoke irritates the tender throat lining.    Limit contact with pets and with allergy-causing substances, such as pollen and mold.    When you re around someone with a sore throat or cold, wash your hands often to keep viruses or bacteria from spreading.    Don t strain your vocal cords.  Call your healthcare provider  Contact your healthcare provider if you have:    A temperature over 101 F (38.3 C)    White spots on the throat    Great difficulty swallowing    Trouble breathing    A skin rash    Recent exposure to someone else with strep bacteria    Severe hoarseness and swollen glands in the neck or jaw   Date Last Reviewed: 8/1/2016 2000-2016 [employer]. [address] [place] [address], [place] [address]. All rights reserved. This information is not intended as a substitute for professional medical care.  Always follow your healthcare professional's instructions.        HPI:  Francisca Williamson is a 67 year old female who presents today for evaluation of sore throat odynophagia.  Patient states that she has had strep throat exposure with her granddaughter.  She now has become symptomatic with sore throat.  She does not have fever chills night sweats or fatigue.  No treatment was tried for this at home.    History obtained from chart review and the patient.    Problem List:  2021-11: IBS (irritable bowel syndrome)  2021-11: Chronic idiopathic constipation  2021-06: Obstructive defecation (H)  2021-06: Pelvic floor dysfunction in female  2021-02: Benign neoplasm of cecum  2021-02: Diverticular disease of large intestine  2021-02: Polyp of colon  2020-05: Osteopenia  2020-05: Diverticulosis of large intestine without hemorrhage  2019-09: Insomnia, idiopathic  2012-11: Type 2 diabetes mellitus without complication, without long-  term current use of insulin (H)  2012-11: Overweight  2012-11: Hyperlipidemia  2011-06: Family history of osteoporosis  2009-10: BPV (benign positional vertigo)  2009-07: B12 deficiency  2008-04: Vitamin D deficiency  2008-04: Gastroesophageal reflux disease  2008-04: Celiac disease  2005-01: Shingles  2004-01: Osteoporosis  2004-01: History of colonic polyps      Past Medical History:   Diagnosis Date     B12 deficiency      Celiac disease      Diverticulitis      Diverticulosis      GERD (gastroesophageal reflux disease)     states main problem is heartburn     High cholesterol      Osteoporosis      Shingles 1/1/2005       Social History     Tobacco Use     Smoking status: Never     Smokeless tobacco: Never   Substance Use Topics     Alcohol use: Yes     Alcohol/week: 7.0 standard drinks       Review of Systems  As above in HPI otherwise negative.    Vitals:    10/22/22 1501   BP: 115/74   Pulse: 81   Resp: 18   Temp: 97.9  F (36.6  C)   TempSrc: Oral   SpO2: 97%   Weight: 64.9 kg (143 lb)        General: Patient is resting comfortably no acute distress is afebrile  HEENT: Head is normocephalic atraumatic   eyes are PERRL EOMI sclera anicteric   TMs are clear bilaterally  Throat is without pharyngeal wall erythema and no exudate  No cervical lymphadenopathy or tenderness to palpation  LUNGS: Clear to auscultation bilaterally  HEART: Regular rate and rhythm  Skin: Without rash non-diaphoretic    Physical Exam      Labs:  Results for orders placed or performed in visit on 10/22/22   Streptococcus A Rapid Screen w/Reflex to PCR - Clinic Collect     Status: Normal    Specimen: Throat; Swab   Result Value Ref Range    Group A Strep antigen Negative Negative       At the end of the encounter, I discussed results, diagnosis, medications. Discussed red flags for immediate return to clinic/ER, as well as indications for follow up if no improvement. Patient understood and agreed to plan. Patient was stable for discharge.

## 2022-10-22 NOTE — PATIENT INSTRUCTIONS
Suggested increased rest increased fluids and bedside humidification  Over-the-counter Tylenol for comfort.  Follow packaging directions  Over-the-counter throat lozenges with benzocaine such as Cepacol may be used if indicated and is not a choking hazard based on age.  Follow packaging directions.  Do not overuse the benzocaine as it will dry the throat and make it uncomfortable.  Follow up with primary care provider if you do not get resolution with the course of treatment.  Return to walk-in care if complication or new symptoms arise in the interim.            Self-Care for Sore Throats  Sore throats happen for many reasons, such as colds, allergies, and infections caused by viruses or bacteria. In any case, your throat becomes red and sore. Your goal for self-care is to reduce your discomfort while giving your throat a chance to heal.    Moisten and soothe your throat  Tips include the following:  Try a sip of water first thing after waking up.  Keep your throat moist by drinking 6 or more glasses of clear liquids every day.  Run a cool-air humidifier in your room overnight.  Avoid cigarette smoke.   Suck on throat lozenges, cough drops, hard candy, ice chips, or frozen fruit-juice bars. Use the sugar-free versions if your diet or medical condition requires them.  Gargle to ease irritation  Gargling every hour or 2 can ease irritation. Try gargling with 1 of these solutions:  1/4 teaspoon of salt in 1/2 cup of warm water  An over-the-counter anesthetic gargle  Use medicine for more relief  Over-the-counter medicine can reduce sore throat symptoms. Ask your pharmacist if you have questions about which medicine to use:  Ease pain with anesthetic sprays. Aspirin or an aspirin substitute also helps. Remember, never give aspirin to anyone 18 or younger, or if you are already taking blood thinners.   For sore throats caused by allergies, try antihistamines to block the allergic reaction.  Remember: unless a sore  throat is caused by a bacterial infection, antibiotics won t help you.  Prevent future sore throats  Prevention tips include the following:  Stop smoking or reduce contact with secondhand smoke. Smoke irritates the tender throat lining.  Limit contact with pets and with allergy-causing substances, such as pollen and mold.  When you re around someone with a sore throat or cold, wash your hands often to keep viruses or bacteria from spreading.  Don t strain your vocal cords.  Call your healthcare provider  Contact your healthcare provider if you have:  A temperature over 101 F (38.3 C)  White spots on the throat  Great difficulty swallowing  Trouble breathing  A skin rash  Recent exposure to someone else with strep bacteria  Severe hoarseness and swollen glands in the neck or jaw   Date Last Reviewed: 8/1/2016 2000-2016 [employer]. [address] [place] [address], [place] [address]. All rights reserved. This information is not intended as a substitute for professional medical care. Always follow your healthcare professional's instructions.

## 2022-10-23 LAB — SARS-COV-2 RNA RESP QL NAA+PROBE: NEGATIVE

## 2022-10-26 ENCOUNTER — TELEPHONE (OUTPATIENT)
Dept: FAMILY MEDICINE | Facility: CLINIC | Age: 67
End: 2022-10-26

## 2022-10-26 ENCOUNTER — OFFICE VISIT (OUTPATIENT)
Dept: FAMILY MEDICINE | Facility: CLINIC | Age: 67
End: 2022-10-26
Payer: COMMERCIAL

## 2022-10-26 VITALS
HEART RATE: 78 BPM | RESPIRATION RATE: 16 BRPM | TEMPERATURE: 98.5 F | WEIGHT: 146.4 LBS | DIASTOLIC BLOOD PRESSURE: 72 MMHG | OXYGEN SATURATION: 97 % | SYSTOLIC BLOOD PRESSURE: 120 MMHG | BODY MASS INDEX: 24.81 KG/M2

## 2022-10-26 DIAGNOSIS — R59.1 LYMPHADENOPATHY: Primary | ICD-10-CM

## 2022-10-26 PROCEDURE — 99213 OFFICE O/P EST LOW 20 MIN: CPT | Performed by: PHYSICIAN ASSISTANT

## 2022-10-26 RX ORDER — GABAPENTIN 100 MG/1
CAPSULE ORAL
COMMUNITY
Start: 2022-10-25 | End: 2022-12-21

## 2022-10-26 NOTE — TELEPHONE ENCOUNTER
Patient called back today and wanted to let Dr. Horne know that the cellulitis has moved into her left arm elbow area. She sated it seems more swollen and it is concerning her. Wants to know what to do or if she can get in with someone soon? She was letting us know because Dr. Horne told her to let us know if anything changes at all.  Please advise

## 2022-10-26 NOTE — PROGRESS NOTES
Patient presents with:  Elbow left: X Follow up 10/21. Elbow bend. Swelling. Pinching when bending. Burning pain Lt arm.  Throat Problem: X Follow up 10/22. Pt states throat feeling better but still white patches.      Clinical Decision Making:  I suspect that this previous cellulitis may not have been cellulitis, but rather inflammatory reaction to vaccination.  We commonly see that with COVID vaccines.  Patient now experiencing antecubital fossa lymphadenopathy, no signs of recurrent cellulitis.  Patient was reassured that this is likely reactive lymph node that will resolve itself over the course the next 2 to 3 weeks.  She was instructed to follow-up if symptoms fail to improve.  No findings concerning for DVT or lymphangitis or thrombophlebitis.  She has not had any recent breaks in the skin at the area of concern.    Patient also concerned about white area in the back of the throat.  No obvious abnormalities on examination.  Patient was reassured and instructed to follow-up if she has new or worsening concerns.      ICD-10-CM    1. Lymphadenopathy  R59.1           Patient Instructions   1. Follow up if no improvement in arm swelling in the next 2-3 weeks.   2. Follow up sooner if you develop new or worsening skin changes.   3. Follow up if you develop throat pain.       HPI:  Francisca Williamson is a 67 year old female with past medical history of type 2 diabetes who presents today complaining of swelling and discomfort in the left antecubital fossa. Patient was previously diagnosed with cellulitis of the left upper arm which occurred shortly after an influenza shot.  Last she was seen for this was 10/21/2022.  At that time cellulitis was resolved.    Patient also noticed a white spot on the back of her throat.  Throat pain is gone, but she would like white spots looked at.      History obtained from the patient.    Problem List:  2021-11: IBS (irritable bowel syndrome)  2021-11: Chronic idiopathic  constipation  2021-06: Obstructive defecation (H)  2021-06: Pelvic floor dysfunction in female  2021-02: Benign neoplasm of cecum  2021-02: Diverticular disease of large intestine  2021-02: Polyp of colon  2020-05: Osteopenia  2020-05: Diverticulosis of large intestine without hemorrhage  2019-09: Insomnia, idiopathic  2012-11: Type 2 diabetes mellitus without complication, without long-  term current use of insulin (H)  2012-11: Overweight  2012-11: Hyperlipidemia  2011-06: Family history of osteoporosis  2009-10: BPV (benign positional vertigo)  2009-07: B12 deficiency  2008-04: Vitamin D deficiency  2008-04: Gastroesophageal reflux disease  2008-04: Celiac disease  2005-01: Shingles  2004-01: Osteoporosis  2004-01: History of colonic polyps      Past Medical History:   Diagnosis Date     B12 deficiency      Celiac disease      Diverticulitis      Diverticulosis      GERD (gastroesophageal reflux disease)     states main problem is heartburn     High cholesterol      Osteoporosis      Shingles 1/1/2005       Social History     Tobacco Use     Smoking status: Never     Smokeless tobacco: Never   Substance Use Topics     Alcohol use: Yes     Alcohol/week: 7.0 standard drinks       Review of Systems    Vitals:    10/26/22 1328   BP: 120/72   BP Location: Right arm   Patient Position: Sitting   Cuff Size: Adult Regular   Pulse: 78   Resp: 16   Temp: 98.5  F (36.9  C)   TempSrc: Oral   SpO2: 97%   Weight: 66.4 kg (146 lb 6.4 oz)       Physical Exam  Vitals and nursing note reviewed.   Constitutional:       General: She is not in acute distress.     Appearance: She is not toxic-appearing or diaphoretic.   HENT:      Head: Normocephalic and atraumatic.      Right Ear: External ear normal.      Left Ear: External ear normal.      Mouth/Throat:     Eyes:      Conjunctiva/sclera: Conjunctivae normal.   Pulmonary:      Effort: Pulmonary effort is normal. No respiratory distress.   Skin:         Neurological:      Mental  Status: She is alert.   Psychiatric:         Mood and Affect: Mood normal.         Behavior: Behavior normal.         Thought Content: Thought content normal.         Judgment: Judgment normal.         At the end of the encounter, I discussed results, diagnosis, medications. Discussed red flags for immediate return to clinic/ER, as well as indications for follow up if no improvement. Patient understood and agreed to plan. Patient was stable for discharge.

## 2022-10-26 NOTE — TELEPHONE ENCOUNTER
Called patient to relay covering provider's notation below.  States that she saw PCP last Friday & cellulitis has resolved.  & patient noticed that her elbow of the same arm where the cellulitis was is very swollen this am. Mentioned that she has a hx of having bouts of cellulitis twice on same arm a couple years ago after having flu shot as well. Advised patient to go to [facility] for evaluation due to no openings in clinic. Patient verbalizes understanding & has no further questions.    Harsha Coleman, RN, BSN  [facility]

## 2022-10-26 NOTE — TELEPHONE ENCOUNTER
Do not know if Dr. Horne will look at this message today.  She might have some recommendations as she is seeing the patient for this.  However, if the patient thinks that this has worsened, she needs to be seen and probably her best bet would be to go into walk-in care.

## 2022-10-26 NOTE — PATIENT INSTRUCTIONS
Follow up if no improvement in arm swelling in the next 2-3 weeks.   Follow up sooner if you develop new or worsening skin changes.   Follow up if you develop throat pain.

## 2022-10-27 ENCOUNTER — MYC MEDICAL ADVICE (OUTPATIENT)
Dept: FAMILY MEDICINE | Facility: CLINIC | Age: 67
End: 2022-10-27

## 2022-10-27 DIAGNOSIS — M25.522 LEFT ELBOW PAIN: Primary | ICD-10-CM

## 2022-10-27 DIAGNOSIS — M25.422 PAIN AND SWELLING OF LEFT ELBOW: ICD-10-CM

## 2022-10-27 DIAGNOSIS — M25.522 PAIN AND SWELLING OF LEFT ELBOW: ICD-10-CM

## 2022-10-27 NOTE — TELEPHONE ENCOUNTER
Dr. Horne,    This patient has sent numerous messages regarding this issue. She was seen yesterday see attached note below. Do you want her to make an appt with you to discuss>?        Bradley Ling RN     [facility]

## 2022-10-27 NOTE — TELEPHONE ENCOUNTER
Please call her: I ordered CT of her elbow to look for deep tissue infection. Please help her schedule. In the meantime she can continue to elevate, use tylenol prn.

## 2022-10-31 DIAGNOSIS — E78.5 HYPERLIPIDEMIA, UNSPECIFIED HYPERLIPIDEMIA TYPE: ICD-10-CM

## 2022-10-31 RX ORDER — PRAVASTATIN SODIUM 20 MG
TABLET ORAL
Qty: 90 TABLET | Refills: 0 | Status: SHIPPED | OUTPATIENT
Start: 2022-10-31 | End: 2022-12-21

## 2022-11-01 ENCOUNTER — LAB REQUISITION (OUTPATIENT)
Dept: LAB | Facility: CLINIC | Age: 67
End: 2022-11-01
Payer: COMMERCIAL

## 2022-11-01 ENCOUNTER — TRANSFERRED RECORDS (OUTPATIENT)
Dept: HEALTH INFORMATION MANAGEMENT | Facility: CLINIC | Age: 67
End: 2022-11-01

## 2022-11-01 DIAGNOSIS — R35.0 FREQUENCY OF MICTURITION: ICD-10-CM

## 2022-11-01 PROCEDURE — 87086 URINE CULTURE/COLONY COUNT: CPT | Mod: ORL | Performed by: OBSTETRICS & GYNECOLOGY

## 2022-11-01 NOTE — TELEPHONE ENCOUNTER
"Last Written Prescription Date:  7/25/22  Last Fill Quantity: 90,  # refills: 0   Last office visit provider:  10/21/22     Requested Prescriptions   Pending Prescriptions Disp Refills     pravastatin (PRAVACHOL) 20 MG tablet [Pharmacy Med Name: PRAVASTATIN 20MG TABLETS] 90 tablet 0     Sig: TAKE 1 TABLET(20 MG) BY MOUTH DAILY       Statins Protocol Passed - 10/31/2022  8:28 AM        Passed - LDL on file in past 12 months     Recent Labs   Lab Test 12/13/21  1106   LDL 91             Passed - No abnormal creatine kinase in past 12 months     Recent Labs   Lab Test 10/21/22  1037   CKT 84                Passed - Recent (12 mo) or future (30 days) visit within the authorizing provider's specialty     Patient has had an office visit with the authorizing provider or a provider within the authorizing providers department within the previous 12 mos or has a future within next 30 days. See \"Patient Info\" tab in inbasket, or \"Choose Columns\" in Meds & Orders section of the refill encounter.              Passed - Medication is active on med list        Passed - Patient is age 18 or older        Passed - No active pregnancy on record        Passed - No positive pregnancy test in past 12 months             Reva Arzate, RN 10/31/22 8:36 PM  "

## 2022-11-03 ENCOUNTER — MYC MEDICAL ADVICE (OUTPATIENT)
Dept: FAMILY MEDICINE | Facility: CLINIC | Age: 67
End: 2022-11-03

## 2022-11-03 ENCOUNTER — HOSPITAL ENCOUNTER (OUTPATIENT)
Dept: CT IMAGING | Facility: HOSPITAL | Age: 67
Discharge: HOME | End: 2022-11-03
Attending: FAMILY MEDICINE | Admitting: FAMILY MEDICINE
Payer: COMMERCIAL

## 2022-11-03 DIAGNOSIS — M25.522 PAIN AND SWELLING OF LEFT ELBOW: ICD-10-CM

## 2022-11-03 DIAGNOSIS — R31.9 HEMATURIA, UNSPECIFIED TYPE: Primary | ICD-10-CM

## 2022-11-03 DIAGNOSIS — M25.422 PAIN AND SWELLING OF LEFT ELBOW: ICD-10-CM

## 2022-11-03 DIAGNOSIS — M25.522 LEFT ELBOW PAIN: ICD-10-CM

## 2022-11-03 LAB — BACTERIA UR CULT: NORMAL

## 2022-11-03 PROCEDURE — 73200 CT UPPER EXTREMITY W/O DYE: CPT | Mod: LT

## 2022-11-04 NOTE — TELEPHONE ENCOUNTER
Dr. Horne,    Pended requested referral if you think it's appropriate.    Bradley Ling RN     [facility]

## 2022-11-07 ENCOUNTER — TELEPHONE (OUTPATIENT)
Dept: FAMILY MEDICINE | Facility: CLINIC | Age: 67
End: 2022-11-07

## 2022-11-07 NOTE — TELEPHONE ENCOUNTER
Called pt to discuss CT elbow result. CT shows ulnar neuritis and potentially edema/inflammation/cellulitis.   Patient reports tingling with bending left elbow and some mild swelling but things are improving slowly.   No more antibiotics for now.   Her dermatologist gave her a rx for gabapentin but pt hasn't started it.   Monitor for now if still irritated 1-2 months can potentially do MRI of elbow as suggested by radiology.   Pt agreed with plan.

## 2022-11-14 ENCOUNTER — MYC MEDICAL ADVICE (OUTPATIENT)
Dept: FAMILY MEDICINE | Facility: CLINIC | Age: 67
End: 2022-11-14

## 2022-11-14 NOTE — LETTER
12/15/2022    INSURER: Payor: [facility] / Plan: [facility] MEDICARE ADVANTAGE / Product Type: Medicare /   ATTN: Attn: First level appeal   Case # [id number]  Re: Prior Authorization Request  Patient: Francisca Williamson  Policy ID#:  [id number]  : 1955      To Whom it May Concern:    I am writing to formally request a prior authorization of coverage for my patient,  Francisca Williamson, for CT of her left elbow without contrast of her elbow performed on 11/3/2022 .  I am requesting authorization for applicable provider professional and facility services associated with this evaluation    The patient was seen by me on 10/07/2022 during which visit she received high dose influenza into her left deltoid region and one day later on 10/08/2022 she was again evaluated by me for pain and numbness/tingling on the left upper arm and left elbow and was determined to have cellulitis secondary to influenza shot into the left deltoid and was then treated with antibiotics. She then sent me several [employer] messages (dated 10/9/22, 10/12/22, and 10/13) following this visit detailing that her symptoms had not improved despite completing antibiotics course. She continued to have tightness and pain involving her left upper arm and elbow. She was then seen in clinic by me again on 10/21/2022; during this visit redness has improved however she continued with have tightness and pain. CK lab was checked and normal. I advised her to continue to arm elevation and over-the-counter pain medications as needed. However symptoms did not improve and she was seen in urgent care on 10/26/2022 for ongoing pain and tightness of left upper arm and elbow and per urgent care's provider patient was determined to have antecubital fossa lymphadenopathy and was advised to wait it out. The patient then sent me more [employer] messages detailing that she continued to have pinching pain and tightness and now swelling of the left elbow. Given her prolonged symptoms I  "determined that it was medically appropriate to obtain a CT without contrast of the elbow to not miss serious causes for her ongoing symptoms, including compartment syndrome, deep seated tissue infection, etc. XR of the left upper arm/elbow would not be able to provide appropriate diagnoses of her symptoms.   Sure enough CT without contrast of the left elbow was read: \"There is some edema/inflammation or potential cellulitis surrounding the region of the cubital tunnel. The ulnar nerve appears contiguous but there could be a component of peripheral neuritis. MRI could be performed for further evaluation if   Indicated.\"     I discussed with patient the CT finding and by this time with aggressive elevation, tylenol, and heat her symptoms were slightly improved. Patient was also given gabapentin to help with ulnar neuritis seen on CT findings.    I firmly believe that this therapy (CT of her elbow without contrast) was clinically appropriate to not miss life threatening conditions. Please contact me at Dept: [phone] if you require additional information to ensure the prompt approval for coverage.    Please send your written decision to me at this address:  [address] [id number] [phone]  Dept: [phone]      Sincerely,      Dorota Horne MD        Enclosures    "

## 2022-11-18 ENCOUNTER — TELEPHONE (OUTPATIENT)
Dept: FAMILY MEDICINE | Facility: CLINIC | Age: 67
End: 2022-11-18

## 2022-11-18 ENCOUNTER — MEDICAL CORRESPONDENCE (OUTPATIENT)
Dept: HEALTH INFORMATION MANAGEMENT | Facility: CLINIC | Age: 67
End: 2022-11-18

## 2022-11-18 NOTE — TELEPHONE ENCOUNTER
Called and spoke to [employer]. They state that you just need to write a letter stating medical necessity.     The letter needs to be faxed to [phone]    Attn: First level appeal   Case # [id number]

## 2022-11-18 NOTE — TELEPHONE ENCOUNTER
Received today Notice of Denial     Please process the appeal for [facility]    Paper work given to Bella

## 2022-11-18 NOTE — TELEPHONE ENCOUNTER
Please call phone number provided by the patient to start appeal process. Please ask insurance what they want from me so they consider covering for her recent elbow CT? a letter? If a letter who should I address to? Set up a phone call to do peer to peer review?

## 2022-11-29 ENCOUNTER — TRANSFERRED RECORDS (OUTPATIENT)
Dept: HEALTH INFORMATION MANAGEMENT | Facility: CLINIC | Age: 67
End: 2022-11-29

## 2022-12-13 NOTE — ADDENDUM NOTE
Encounter addended by: Althea Benítez, PT on: 12/13/2022 12:27 PM   Actions taken: Clinical Note Signed, Flowsheet accepted, Episode resolved

## 2022-12-13 NOTE — PROGRESS NOTES
[facility]    Outpatient Physical Therapy Discharge Note  Patient: Francisca Williamson  : 1955    Beginning/End Dates of Reporting Period:  10/17/2022    Referring Provider: Dorota Horne MD    Therapy Diagnosis: Low back pain with radicular symptoms in BLE     Client Self Report: See initial evaluation note      Goals:  Goal Identifier HEP   Goal Description Pt will be independent with HEP to improve mobility and decrease pain.   Target Date 23   Date Met      Progress (detail required for progress note):  Not met, pt failed to schedule more appointments     Goal Identifier Lifting   Goal Description Pt will report ability to lift heavy objects, greater than 30lbs, with less than 1/10 pain to improve functional mobility and increase tolerance completing ADL's, recreational activities and work related tasks.   Target Date 23   Date Met      Progress (detail required for progress note):  Not met, pt failed to schedule more appointments     Goal Identifier Walking   Goal Description Pt will report ability to walk greater than 1 mile with less than 1/10 pain to improve functional mobility and increase tolerance completing ADL's, recreational activities and work related tasks.   Target Date 23   Date Met      Progress (detail required for progress note):  Not met, pt failed to schedule more appointments         Plan:  Discharge from therapy.    Discharge:    Reason for Discharge: Patient has failed to schedule further appointments.    Equipment Issued: none    Discharge Plan: Patient to continue home program.  Pt to receive new referral for PT from doctor to schedule more PT appointments.       10/17/22 1330   Signing Clinician's Name / Credentials   Signing clinician's name / credentials Althea Benítez PT, DPT   Session Number   Session Number 1   Progress Note/Recertification   Progress  Note Due Date   (10th visit)   Recertification Due Date 01/14/23   Adult Goals   PT Ortho Eval Goals 1;2;3   Ortho Goal 1   Goal Identifier HEP   Goal Description Pt will be independent with HEP to improve mobility and decrease pain.   Target Date 01/14/23   Ortho Goal 2   Goal Identifier Lifting   Goal Description Pt will report ability to lift heavy objects, greater than 30lbs, with less than 1/10 pain to improve functional mobility and increase tolerance completing ADL's, recreational activities and work related tasks.   Target Date 01/14/23   Ortho Goal 3   Goal Identifier Walking   Goal Description Pt will report ability to walk greater than 1 mile with less than 1/10 pain to improve functional mobility and increase tolerance completing ADL's, recreational activities and work related tasks.   Target Date 01/14/23   Subjective Report   Subjective Report See initial evaluation note   Treatment Interventions   Interventions Therapeutic Procedure/Exercise   Therapeutic Procedure/exercise   Therapeutic Procedures: strength, endurance, ROM, flexibillity minutes (72911) 25   Skilled Intervention Education on exercises for HEP. Verbal cues, tactile cues and demonstration of correct technique. Pt educated to complete exercises within pain. Pt educated to stop exercises if causes increase in pain.   Patient Response Tolerated well   Treatment Detail Sit<>stand x 5, quadratus lumborum stretch x 10 second holds, supine lumbar hip roll x 10 second holds, abdominal brace transverse abdominis x 5, bridges x 10, seated hamstring stretch x 10 second holds   Education   Learner Patient   Readiness Eager;Acceptance   Method Booklet/handout;Demonstration   Response Verbalizes Understanding;Demonstrates Understanding   Plan   Homework PTRx   Home program Sit<>stand, quadratus lumborum stretch, supine lumbar hip roll, abdominal brace transverse abdominis, bridge, seated hamstring stretch   Plan for next session Therapeutic exercise:  strengthening, ROM, flexiblity; neuro re-ed: gait and balance training; manual therapy/STM/massage   Comments   Comments Pt is a 67 year old female who presents to physical therapy with low back pain with radicular symptoms in BLE. Pt has decreased lumbar ROM limited due to pain. Pt has decreased strength and limited flexiblity. Pt scored 10% disability on Oswestry Questionnaire. Pt would benefit from physical therapy to improve ROM, increase strength and decrease pain to improve functional mobility and increase tolerance completing ADL's, recreational activities and work related tasks.   Total Session Time   Timed Code Treatment Minutes 25   Total Treatment Time (sum of timed and untimed services) 45   Medicare Claim Information   Medical Diagnosis Strain of muscle, fascia and tendon of lower back, subsequent encounter; numbness in both legs   PT Diagnosis Low back pain with radicular symptoms in BLE   Start of Care Date 10/17/22   Onset date of current episode/exacerbation 09/27/22   Certification date from 10/17/22     Althea Benítez, PT, DPT

## 2022-12-18 ASSESSMENT — ENCOUNTER SYMPTOMS
ABDOMINAL PAIN: 0
HEARTBURN: 0
BREAST MASS: 0
CHILLS: 0
MYALGIAS: 0
JOINT SWELLING: 0
HEADACHES: 0
HEMATOCHEZIA: 0
NAUSEA: 0
DIARRHEA: 0
COUGH: 0
DYSURIA: 0
PALPITATIONS: 0
WEAKNESS: 0
NERVOUS/ANXIOUS: 0
SORE THROAT: 0
FREQUENCY: 0
SHORTNESS OF BREATH: 0
FEVER: 0
ARTHRALGIAS: 1
HEMATURIA: 0
PARESTHESIAS: 1
CONSTIPATION: 1
EYE PAIN: 1
DIZZINESS: 0

## 2022-12-18 ASSESSMENT — ACTIVITIES OF DAILY LIVING (ADL): CURRENT_FUNCTION: NO ASSISTANCE NEEDED

## 2022-12-21 ENCOUNTER — OFFICE VISIT (OUTPATIENT)
Dept: INTERNAL MEDICINE | Facility: CLINIC | Age: 67
End: 2022-12-21
Payer: COMMERCIAL

## 2022-12-21 VITALS
BODY MASS INDEX: 24.75 KG/M2 | HEART RATE: 59 BPM | RESPIRATION RATE: 18 BRPM | TEMPERATURE: 98.5 F | DIASTOLIC BLOOD PRESSURE: 58 MMHG | SYSTOLIC BLOOD PRESSURE: 98 MMHG | OXYGEN SATURATION: 97 % | WEIGHT: 145 LBS | HEIGHT: 64 IN

## 2022-12-21 DIAGNOSIS — Z00.00 ENCOUNTER FOR MEDICARE ANNUAL WELLNESS EXAM: Primary | ICD-10-CM

## 2022-12-21 DIAGNOSIS — E78.5 HYPERLIPIDEMIA, UNSPECIFIED HYPERLIPIDEMIA TYPE: ICD-10-CM

## 2022-12-21 DIAGNOSIS — E53.8 B12 DEFICIENCY: ICD-10-CM

## 2022-12-21 DIAGNOSIS — M62.89 PELVIC FLOOR DYSFUNCTION IN FEMALE: ICD-10-CM

## 2022-12-21 DIAGNOSIS — K58.1 IRRITABLE BOWEL SYNDROME WITH CONSTIPATION: ICD-10-CM

## 2022-12-21 DIAGNOSIS — K90.0 CELIAC DISEASE: ICD-10-CM

## 2022-12-21 DIAGNOSIS — E78.2 MIXED HYPERLIPIDEMIA: ICD-10-CM

## 2022-12-21 DIAGNOSIS — M85.88 OSTEOPENIA OF SPINE: ICD-10-CM

## 2022-12-21 DIAGNOSIS — Z23 PNEUMOCOCCAL VACCINATION ADMINISTERED AT CURRENT VISIT: ICD-10-CM

## 2022-12-21 DIAGNOSIS — E55.9 VITAMIN D DEFICIENCY: ICD-10-CM

## 2022-12-21 PROBLEM — K59.04 CHRONIC IDIOPATHIC CONSTIPATION: Status: RESOLVED | Noted: 2021-11-08 | Resolved: 2022-12-21

## 2022-12-21 LAB
ALBUMIN SERPL BCG-MCNC: 4.6 G/DL (ref 3.5–5.2)
ALP SERPL-CCNC: 64 U/L (ref 35–104)
ALT SERPL W P-5'-P-CCNC: 19 U/L (ref 10–35)
ANION GAP SERPL CALCULATED.3IONS-SCNC: 12 MMOL/L (ref 7–15)
AST SERPL W P-5'-P-CCNC: 33 U/L (ref 10–35)
BASOPHILS # BLD AUTO: 0 10E3/UL (ref 0–0.2)
BASOPHILS NFR BLD AUTO: 0 %
BILIRUB SERPL-MCNC: 0.3 MG/DL
BUN SERPL-MCNC: 9.7 MG/DL (ref 8–23)
CALCIUM SERPL-MCNC: 10 MG/DL (ref 8.8–10.2)
CHLORIDE SERPL-SCNC: 103 MMOL/L (ref 98–107)
CHOLEST SERPL-MCNC: 182 MG/DL
CREAT SERPL-MCNC: 0.81 MG/DL (ref 0.51–0.95)
DEPRECATED HCO3 PLAS-SCNC: 25 MMOL/L (ref 22–29)
EOSINOPHIL # BLD AUTO: 0.2 10E3/UL (ref 0–0.7)
EOSINOPHIL NFR BLD AUTO: 3 %
ERYTHROCYTE [DISTWIDTH] IN BLOOD BY AUTOMATED COUNT: 13.2 % (ref 10–15)
GFR SERPL CREATININE-BSD FRML MDRD: 78 ML/MIN/1.73M2
GLUCOSE SERPL-MCNC: 108 MG/DL (ref 70–99)
HCT VFR BLD AUTO: 42.6 % (ref 35–47)
HDLC SERPL-MCNC: 60 MG/DL
HGB BLD-MCNC: 13.5 G/DL (ref 11.7–15.7)
IMM GRANULOCYTES # BLD: 0 10E3/UL
IMM GRANULOCYTES NFR BLD: 0 %
LDLC SERPL CALC-MCNC: 108 MG/DL
LYMPHOCYTES # BLD AUTO: 1.5 10E3/UL (ref 0.8–5.3)
LYMPHOCYTES NFR BLD AUTO: 25 %
MCH RBC QN AUTO: 28.8 PG (ref 26.5–33)
MCHC RBC AUTO-ENTMCNC: 31.7 G/DL (ref 31.5–36.5)
MCV RBC AUTO: 91 FL (ref 78–100)
MONOCYTES # BLD AUTO: 0.4 10E3/UL (ref 0–1.3)
MONOCYTES NFR BLD AUTO: 7 %
NEUTROPHILS # BLD AUTO: 3.7 10E3/UL (ref 1.6–8.3)
NEUTROPHILS NFR BLD AUTO: 65 %
NONHDLC SERPL-MCNC: 122 MG/DL
PLATELET # BLD AUTO: 219 10E3/UL (ref 150–450)
POTASSIUM SERPL-SCNC: 4 MMOL/L (ref 3.4–5.3)
PROT SERPL-MCNC: 7.7 G/DL (ref 6.4–8.3)
RBC # BLD AUTO: 4.68 10E6/UL (ref 3.8–5.2)
SODIUM SERPL-SCNC: 140 MMOL/L (ref 136–145)
TRIGL SERPL-MCNC: 69 MG/DL
VIT B12 SERPL-MCNC: 535 PG/ML (ref 232–1245)
WBC # BLD AUTO: 5.7 10E3/UL (ref 4–11)

## 2022-12-21 PROCEDURE — 82607 VITAMIN B-12: CPT | Performed by: INTERNAL MEDICINE

## 2022-12-21 PROCEDURE — 36415 COLL VENOUS BLD VENIPUNCTURE: CPT | Performed by: INTERNAL MEDICINE

## 2022-12-21 PROCEDURE — 80053 COMPREHEN METABOLIC PANEL: CPT | Performed by: INTERNAL MEDICINE

## 2022-12-21 PROCEDURE — G0009 ADMIN PNEUMOCOCCAL VACCINE: HCPCS | Performed by: INTERNAL MEDICINE

## 2022-12-21 PROCEDURE — 90677 PCV20 VACCINE IM: CPT | Performed by: INTERNAL MEDICINE

## 2022-12-21 PROCEDURE — G0439 PPPS, SUBSEQ VISIT: HCPCS | Performed by: INTERNAL MEDICINE

## 2022-12-21 PROCEDURE — 85025 COMPLETE CBC W/AUTO DIFF WBC: CPT | Performed by: INTERNAL MEDICINE

## 2022-12-21 PROCEDURE — 99214 OFFICE O/P EST MOD 30 MIN: CPT | Mod: 25 | Performed by: INTERNAL MEDICINE

## 2022-12-21 PROCEDURE — 80061 LIPID PANEL: CPT | Performed by: INTERNAL MEDICINE

## 2022-12-21 PROCEDURE — 82306 VITAMIN D 25 HYDROXY: CPT | Performed by: INTERNAL MEDICINE

## 2022-12-21 RX ORDER — ESTRADIOL 0.1 MG/G
CREAM VAGINAL
Qty: 42.5 G | Refills: 4 | Status: SHIPPED | OUTPATIENT
Start: 2022-12-21 | End: 2024-01-16

## 2022-12-21 RX ORDER — PRAVASTATIN SODIUM 20 MG
20 TABLET ORAL DAILY
Qty: 90 TABLET | Refills: 3 | Status: SHIPPED | OUTPATIENT
Start: 2022-12-21 | End: 2022-12-23

## 2022-12-21 ASSESSMENT — ENCOUNTER SYMPTOMS
NERVOUS/ANXIOUS: 0
HEADACHES: 0
HEMATOCHEZIA: 0
FEVER: 0
ARTHRALGIAS: 1
CHILLS: 0
SORE THROAT: 0
ABDOMINAL PAIN: 0
SHORTNESS OF BREATH: 0
COUGH: 0
JOINT SWELLING: 0
HEMATURIA: 0
FREQUENCY: 0
CONSTIPATION: 1
PARESTHESIAS: 1
NAUSEA: 0
WEAKNESS: 0
EYE PAIN: 1
DIARRHEA: 0
HEARTBURN: 0
MYALGIAS: 0
DYSURIA: 0
PALPITATIONS: 0
DIZZINESS: 0
BREAST MASS: 0

## 2022-12-21 ASSESSMENT — PAIN SCALES - GENERAL: PAINLEVEL: NO PAIN (0)

## 2022-12-21 ASSESSMENT — ACTIVITIES OF DAILY LIVING (ADL): CURRENT_FUNCTION: NO ASSISTANCE NEEDED

## 2022-12-21 NOTE — ASSESSMENT & PLAN NOTE
Follows annually with endocrine.  Last DEXA scan January 2021 did show osteopenia of her spine.  She is taking calcium and vitamin D supplementation.  Next DEXA is already scheduled for January 2023.  She will then follow-up with endocrine in March to discuss next steps.

## 2022-12-21 NOTE — ASSESSMENT & PLAN NOTE
Patient is doing well today. We will update annual labs. She will receive PCV-20. She will get shingles vaccine at local pharmacy. She is up to date on all cancer screenings. We will plan to continue annual mammograms given her family history.  Next colonoscopy is due in 2026.  We discussed importance of continuing healthy lifestyle with well-balanced diet and 30 minutes of exercise 5 days a week.

## 2022-12-21 NOTE — PROGRESS NOTES
"SUBJECTIVE:   Francisca is a 67 year old who presents for Preventive Visit.  Patient has been advised of split billing requirements and indicates understanding: Yes  Are you in the first 12 months of your Medicare coverage?  No    Healthy Habits:     In general, how would you rate your overall health?  Good    Frequency of exercise:  6-7 days/week    Duration of exercise:  30-45 minutes    Do you usually eat at least 4 servings of fruit and vegetables a day, include whole grains    & fiber and avoid regularly eating high fat or \"junk\" foods?  Yes    Taking medications regularly:  Yes    Medication side effects:  Not applicable    Ability to successfully perform activities of daily living:  No assistance needed    Home Safety:  No safety concerns identified    Hearing Impairment:  Difficulty following a conversation in a noisy restaurant or crowded room    In the past 6 months, have you been bothered by leaking of urine?  No    In general, how would you rate your overall mental or emotional health?  Excellent      PHQ-2 Total Score: 0    Additional concerns today:  Yes    Elbow: Started after flu shot, initially thought cellulitis, treated with antibiotics, swelling didn't improve, CT elbow 11/3/22 showed some edema/inflammation or potential cellulitis surrounding the region of the cubital tunnel. The ulnar nerve appears contiguous but there could be a component of peripheral neuritis.. Also saw dermatology, gave gabapentin for pain.  Overall swelling continues to improve but is still present.  Is not having any associated pain anymore.    HLD: Pravastatin 20 mg daily, due for repeat lipids today.    IBS-C: current regimen is daily metamucil and miralax. PRN hyoxcyamine. Last colonoscopy 2/2021, small polyp removed, due for repeat in 5 years. Follows sometimes at [facility]. Better off dairy.     BMP WNL and A1c 5.6 (10/21/22)    Grandmother had breast cancer in upper 50s. Mammogram BI-RADS 1 8/17/22. Prefers annual " screening.     Only ever had diagnosis of pre-diabetes.  Resolved diabetes diagnosis in her chart.    Genitourinary syndrome of menopause: Well-controlled with estradiol twice weekly.     Microscopic hematuria: will see Urology in January     Celiac disease: C/b B12 deficiency    Osteopenia: Following with endocrine annually, next DEXA due on 1/2023. Taking 1200 mg daily of calcium (through food) and vitamin D3 3000 international units daily.     Sinus symptoms: over a year, intermittent. Sinus sensitivityTight jaw and shoulder and arm muscle aches, ears get plugged. Never rhinorrhea. Saw ENT who referred to spine for TMJ like symptoms. Went to jaw PT, didn't help too much.  Symptoms are still intermittent.  Reasonably controlled with as needed pain medications and heat.    Rosacea: metrocream available    Back pain: PRN flexeril    Cold sores: PRN valtrex     Have you ever done Advance Care Planning? (For example, a Health Directive, POLST, or a discussion with a medical provider or your loved ones about your wishes): No, advance care planning information given to patient to review.  Advanced care planning was discussed at today's visit.    Fall risk  Fallen 2 or more times in the past year?: No  Any fall with injury in the past year?: No    Cognitive Screening   1) Repeat 3 items (Leader, Season, Table)    2) Clock draw: Normal  3) 3 item recall: Recalls 3 objects  Results: 3 items recalled: COGNITIVE IMPAIRMENT LESS LIKELY    Mini-CogTM Copyright S Richar. Licensed by the author for use in [facility]; reprinted with permission (cornelius@.[email or web address]). All rights reserved.      Do you have sleep apnea, excessive snoring or daytime drowsiness?: no    Reviewed and updated as needed this visit by clinical staff   Tobacco  Allergies  Meds  Problems  Med Hx  Surg Hx  Fam Hx          Reviewed and updated as needed this visit by Provider   Tobacco  Allergies  Meds  Problems  Med Hx  Surg Hx  Fam Hx          Social History     Tobacco Use     Smoking status: Never     Smokeless tobacco: Never   Substance Use Topics     Alcohol use: Yes     Alcohol/week: 7.0 standard drinks       Current providers sharing in care for this patient include:   Patient Care Team:  Mleva Melchor MD as PCP - General (Internal Medicine)  Dorota Horne MD as Assigned PCP  Rajwinder Rucker MD as MD (Endocrinology, Diabetes, and Metabolism)  Rajwinder Rucker MD as Assigned Endocrinology Provider  Dilshad Prakash MD as Assigned Surgical Provider  Shanell Booker PA-C as Physician Assistant (Urology)    The following health maintenance items are reviewed in Epic and correct as of today:  Health Maintenance   Topic Date Due     ZOSTER IMMUNIZATION (1 of 2) Never done     LIPID  12/13/2022     ANNUAL REVIEW OF HM ORDERS  12/13/2022     A1C  01/21/2023     MICROALBUMIN  03/09/2023     DTAP/TDAP/TD IMMUNIZATION (5 - Td or Tdap) 06/10/2023     BMP  10/21/2023     MEDICARE ANNUAL WELLNESS VISIT  12/21/2023     FALL RISK ASSESSMENT  12/21/2023     MAMMO SCREENING  08/17/2024     COLORECTAL CANCER SCREENING  02/17/2026     ADVANCE CARE PLANNING  12/21/2027     DEXA  01/26/2036     PHQ-2 (once per calendar year)  Completed     INFLUENZA VACCINE  Completed     Pneumococcal Vaccine: 65+ Years  Completed     COVID-19 Vaccine  Completed     IPV IMMUNIZATION  Aged Out     MENINGITIS IMMUNIZATION  Aged Out     DIABETIC FOOT EXAM  Discontinued     HEPATITIS C SCREENING  Discontinued     EYE EXAM  Discontinued     Labs reviewed in EPIC  Pneumonia Vaccine:For adults 65 years or older who do not have an immunocompromising condition, cerebrospinal fluid leak, or cochlear implant and want to receive PPSV23 ONLY: Administer 1 dose of PPSV23. Anyone who received any doses of PPSV23 before age 65 should receive 1 final dose of the vaccine at age 65 or older. Administer this last dose at least 5 years after the prior PPSV23 dose.  Mammogram  Screening: Mammogram Screening: Recommended mammography every 1-2 years with patient discussion and risk factor consideration    FHS-7:   Breast CA Risk Assessment (FHS-7) 8/11/2021 12/10/2021 8/17/2022 12/18/2022   Did any of your first-degree relatives have breast or ovarian cancer? No No No Unknown   Did any of your relatives have bilateral breast cancer? Unknown Yes No Yes   Did any man in your family have breast cancer? No No No No   Did any woman in your family have breast and ovarian cancer? No Yes No Unknown   Did any woman in your family have breast cancer before age 50 y? No Yes No Yes   Do you have 2 or more relatives with breast and/or ovarian cancer? No No No Unknown   Do you have 2 or more relatives with breast and/or bowel cancer? No Yes No Yes     Continue annual screening for mammograms given family history of breast cancer.  Pertinent mammograms are reviewed under the imaging tab.    Review of Systems   Constitutional: Negative for chills and fever.   HENT: Negative for congestion, ear pain, hearing loss and sore throat.    Eyes: Positive for pain. Negative for visual disturbance.   Respiratory: Negative for cough and shortness of breath.    Cardiovascular: Negative for chest pain, palpitations and peripheral edema.   Gastrointestinal: Positive for constipation. Negative for abdominal pain, diarrhea, heartburn, hematochezia and nausea.   Breasts:  Negative for tenderness, breast mass and discharge.   Genitourinary: Negative for dysuria, frequency, genital sores, hematuria, pelvic pain, urgency, vaginal bleeding and vaginal discharge.   Musculoskeletal: Positive for arthralgias. Negative for joint swelling and myalgias.   Skin: Negative for rash.   Neurological: Positive for paresthesias. Negative for dizziness, weakness and headaches.   Psychiatric/Behavioral: Negative for mood changes. The patient is not nervous/anxious.      OBJECTIVE:   BP 98/58 (BP Location: Right arm, Patient Position:  "Sitting, Cuff Size: Adult Regular)   Pulse 59   Temp 98.5  F (36.9  C) (Oral)   Resp 18   Ht 1.636 m (5' 4.41\")   Wt 65.8 kg (145 lb)   SpO2 97%   Breastfeeding No   BMI 24.57 kg/m   Estimated body mass index is 24.57 kg/m  as calculated from the following:    Height as of this encounter: 1.636 m (5' 4.41\").    Weight as of this encounter: 65.8 kg (145 lb).  Physical Exam  GENERAL: healthy, alert and no distress  EYES: Eyes grossly normal to inspection, PERRL and conjunctivae and sclerae normal  HENT: Nose and mouth without ulcers or lesions  NECK: no adenopathy, no asymmetry, masses, or scars and thyroid normal to palpation  RESP: lungs clear to auscultation - no rales, rhonchi or wheezes  CV: regular rate and rhythm, normal S1 S2, no S3 or S4, no murmur, click or rub, no peripheral edema and peripheral pulses strong  ABDOMEN: soft, nontender, no hepatosplenomegaly, no masses and bowel sounds normal  MS: Mild edema of L antecubital fossa, no overlying erythema or warmth  SKIN: no suspicious lesions or rashes  NEURO: Normal strength and tone, mentation intact and speech normal  PSYCH: mentation appears normal, affect normal/bright    Diagnostic Test Results:  Labs reviewed in Epic    ASSESSMENT / PLAN:     Problem List Items Addressed This Visit        Digestive    Irritable bowel syndrome with constipation     Constipation predominant.  Currently reasonably controlled with daily Metamucil and MiraLAX.  Also under better control with cutting out dairy.  She does have as needed hyoscyamine available for cramping, but rarely needs this.          Celiac disease     Well-controlled with gluten-free diet.  Is complicated by B12 deficiency for which she takes replacement.         Vitamin D deficiency     Will repeat vitamin D today.  Patient is taking 3000 international units daily.         Relevant Orders    Vitamin D Deficiency    B12 deficiency     2/2 celiac disease.  Continue monthly B12 intramuscular " injections.  We will repeat B12 level today.         Relevant Orders    Vitamin B12       Endocrine    Hyperlipidemia     Patient is due for repeat lipid panel which we will do today.  Continue pravastatin 20 mg daily         Relevant Medications    pravastatin (PRAVACHOL) 20 MG tablet    Other Relevant Orders    Lipid panel reflex to direct LDL Non-fasting       Musculoskeletal and Integumentary    Pelvic floor dysfunction in female     Well-controlled with estradiol vaginally twice weekly.  Refills provided today.         Relevant Medications    estradiol (ESTRACE) 0.1 MG/GM vaginal cream    Osteopenia     Follows annually with endocrine.  Last DEXA scan January 2021 did show osteopenia of her spine.  She is taking calcium and vitamin D supplementation.  Next DEXA is already scheduled for January 2023.  She will then follow-up with endocrine in March to discuss next steps.            Other    Encounter for Medicare annual wellness exam - Primary     Patient is doing well today. We will update annual labs. She will receive PCV-20. She will get shingles vaccine at local pharmacy. She is up to date on all cancer screenings. We will plan to continue annual mammograms given her family history.  Next colonoscopy is due in 2026.  We discussed importance of continuing healthy lifestyle with well-balanced diet and 30 minutes of exercise 5 days a week.         Relevant Orders    Comprehensive metabolic panel    CBC with platelets and differential (Completed)   Other Visit Diagnoses     Pneumococcal vaccination administered at current visit        Relevant Orders    PNEUMOCOCCAL 20 VALENT CONJUGATE (PREVNAR 20) (Completed)        Patient has been advised of split billing requirements and indicates understanding: Yes    COUNSELING:  Reviewed preventive health counseling, as reflected in patient instructions  Special attention given to:       Regular exercise       Healthy diet/nutrition       Immunizations    Vaccinated for:  "Pneumococcal      BMI:   Estimated body mass index is 24.57 kg/m  as calculated from the following:    Height as of this encounter: 1.636 m (5' 4.41\").    Weight as of this encounter: 65.8 kg (145 lb).     She reports that she has never smoked. She has never used smokeless tobacco.    Appropriate preventive services were discussed with this patient, including applicable screening as appropriate for cardiovascular disease, diabetes, osteopenia/osteoporosis, and glaucoma.  As appropriate for age/gender, discussed screening for colorectal cancer, prostate cancer, breast cancer, and cervical cancer. Checklist reviewing preventive services available has been given to the patient.    Reviewed patients plan of care and provided an AVS. The Basic Care Plan (routine screening as documented in Health Maintenance) for Francisca meets the Care Plan requirement. This Care Plan has been established and reviewed with the Patient.      Mevla Melchor MD  [facility]    Identified Health Risks:  "

## 2022-12-21 NOTE — ASSESSMENT & PLAN NOTE
2/2 celiac disease.  Continue monthly B12 intramuscular injections.  We will repeat B12 level today.

## 2022-12-21 NOTE — PATIENT INSTRUCTIONS
Patient Education   Personalized Prevention Plan  You are due for the preventive services outlined below.  Your care team is available to assist you in scheduling these services.  If you have already completed any of these items, please share that information with your care team to update in your medical record.  Health Maintenance Due   Topic Date Due     Diabetic Foot Exam  Never done     Zoster (Shingles) Vaccine (1 of 2) Never done     Pneumococcal Vaccine (2 - PPSV23 if available, else PCV20) 10/06/2021     Eye Exam  11/13/2022     Cholesterol Lab  12/13/2022     ANNUAL REVIEW OF HM ORDERS  12/13/2022     Annual Wellness Visit  12/13/2022       Signs of Hearing Loss      Hearing much better with one ear can be a sign of hearing loss.   Hearing loss is a problem shared by many people. In fact, it is one of the most common health problems, particularly as people age. Most people age 65 and older have some hearing loss. By age 80, almost everyone does. Hearing loss often occurs slowly over the years. So you may not realize your hearing has gotten worse.  Have your hearing checked  Call your healthcare provider if you:    Have to strain to hear normal conversation    Have to watch other people s faces very carefully to follow what they re saying    Need to ask people to repeat what they ve said    Often misunderstand what people are saying    Turn the volume of the television or radio up so high that others complain    Feel that people are mumbling when they re talking to you    Find that the effort to hear leaves you feeling tired and irritated    Notice, when using the phone, that you hear better with one ear than the other  [employer] last reviewed this educational content on 1/1/2020 2000-2021 [employer]. All rights reserved. This information is not intended as a substitute for professional medical care. Always follow your healthcare professional's instructions.

## 2022-12-21 NOTE — ASSESSMENT & PLAN NOTE
Well-controlled with gluten-free diet.  Is complicated by B12 deficiency for which she takes replacement.

## 2022-12-21 NOTE — ASSESSMENT & PLAN NOTE
Constipation predominant.  Currently reasonably controlled with daily Metamucil and MiraLAX.  Also under better control with cutting out dairy.  She does have as needed hyoscyamine available for cramping, but rarely needs this.

## 2022-12-22 ENCOUNTER — MYC MEDICAL ADVICE (OUTPATIENT)
Dept: INTERNAL MEDICINE | Facility: CLINIC | Age: 67
End: 2022-12-22

## 2022-12-22 DIAGNOSIS — E78.5 HYPERLIPIDEMIA, UNSPECIFIED HYPERLIPIDEMIA TYPE: ICD-10-CM

## 2022-12-22 LAB — DEPRECATED CALCIDIOL+CALCIFEROL SERPL-MC: 48 UG/L (ref 20–75)

## 2022-12-22 NOTE — RESULT ENCOUNTER NOTE
Your vitamin D and vitamin B12 levels are normal, you can keep taking your same dose of vitamin D3 and B12 replacement. Your kidney function, electrolytes, liver function and blood counts are normal. Your cholesterol levels are a little above where we would like them on your current dose of pravastatin. If you did not have any issues with higher doses in the past, I would recommend that we increase the dose from 20 mg to 40 mg daily. I will update your prescription in the chart for the higher dose.

## 2022-12-23 ENCOUNTER — MYC MEDICAL ADVICE (OUTPATIENT)
Dept: FAMILY MEDICINE | Facility: CLINIC | Age: 67
End: 2022-12-23

## 2022-12-23 RX ORDER — PRAVASTATIN SODIUM 40 MG
40 TABLET ORAL DAILY
Qty: 90 TABLET | Refills: 3 | Status: SHIPPED | OUTPATIENT
Start: 2022-12-23 | End: 2024-01-04

## 2022-12-26 ENCOUNTER — TELEPHONE (OUTPATIENT)
Dept: ENDOCRINOLOGY | Facility: CLINIC | Age: 67
End: 2022-12-26

## 2022-12-26 DIAGNOSIS — M81.0 OSTEOPOROSIS, UNSPECIFIED OSTEOPOROSIS TYPE, UNSPECIFIED PATHOLOGICAL FRACTURE PRESENCE: Primary | ICD-10-CM

## 2022-12-26 NOTE — TELEPHONE ENCOUNTER
[facility] Call Center    Phone Message    May a detailed message be left on voicemail: yes     Reason for Call: Other: Patient called wondering if lab order canbe extended another month as her appt with Dr Rucker isn't until April. Please follow up. Thank you      Action Taken: Message routed to:  Other: endo    Travel Screening: Not Applicable

## 2022-12-27 NOTE — TELEPHONE ENCOUNTER
Yes, I would like CMP, vitamin D, PTH and phosphorus prior to visit in April (it looks like PTH and phosphorus were not drawn this month so we could extend those orders out).  Also, patient was to have DXA scan before visit.  Thank you.

## 2023-01-13 ENCOUNTER — TELEPHONE (OUTPATIENT)
Dept: UROLOGY | Facility: CLINIC | Age: 68
End: 2023-01-13

## 2023-01-13 ENCOUNTER — OFFICE VISIT (OUTPATIENT)
Dept: UROLOGY | Facility: CLINIC | Age: 68
End: 2023-01-13
Attending: FAMILY MEDICINE
Payer: COMMERCIAL

## 2023-01-13 VITALS
WEIGHT: 144 LBS | SYSTOLIC BLOOD PRESSURE: 122 MMHG | BODY MASS INDEX: 23.99 KG/M2 | HEIGHT: 65 IN | DIASTOLIC BLOOD PRESSURE: 70 MMHG | OXYGEN SATURATION: 96 % | HEART RATE: 79 BPM

## 2023-01-13 DIAGNOSIS — R82.90 ABNORMAL URINALYSIS: ICD-10-CM

## 2023-01-13 LAB
ALBUMIN UR-MCNC: NEGATIVE MG/DL
APPEARANCE UR: CLEAR
BILIRUB UR QL STRIP: NEGATIVE
COLOR UR AUTO: YELLOW
GLUCOSE UR STRIP-MCNC: NEGATIVE MG/DL
HGB UR QL STRIP: ABNORMAL
KETONES UR STRIP-MCNC: NEGATIVE MG/DL
LEUKOCYTE ESTERASE UR QL STRIP: NEGATIVE
NITRATE UR QL: NEGATIVE
PH UR STRIP: 7 [PH] (ref 5–7)
RBC URINE: <1 /HPF
SP GR UR STRIP: 1.01 (ref 1–1.03)
SQUAMOUS EPITHELIAL: <1 /HPF
UROBILINOGEN UR STRIP-ACNC: 0.2 E.U./DL
WBC URINE: 1 /HPF

## 2023-01-13 PROCEDURE — 81001 URINALYSIS AUTO W/SCOPE: CPT | Performed by: PHYSICIAN ASSISTANT

## 2023-01-13 PROCEDURE — 99204 OFFICE O/P NEW MOD 45 MIN: CPT | Performed by: PHYSICIAN ASSISTANT

## 2023-01-13 ASSESSMENT — PAIN SCALES - GENERAL: PAINLEVEL: MILD PAIN (2)

## 2023-01-13 NOTE — NURSING NOTE
Chief Complaint   Patient presents with     Hematuria     Microscopic.   Patient gets some pelvic pain.  Juliana Dickey LPN

## 2023-01-13 NOTE — PROGRESS NOTES
CC: abnormal UA    HPI:  Francisca Williamson is a 67 year old female who presents in consultation from Dr. Westfall for evaluation of the above. Has pain at the end of urination last July. UA with small blood (no micro exam). UC mixed. Repeat UA with PCP in Aug and UA with micro normal. Returned in Nov for UA with Dr. Westfall and UA with small blood (no micro exam) and UC again neg. Increased her topical estrogen. Can have symptoms intermittently. UA today with trace blood.     No gross hematuria.     Past Medical History:   Diagnosis Date     B12 deficiency      Celiac disease      Chronic idiopathic constipation 11/08/2021     Diverticulitis      Diverticulosis      GERD (gastroesophageal reflux disease)     states main problem is heartburn     High cholesterol      Mumps      Osteoporosis      Shingles 01/01/2005     Spider veins      STD (sexually transmitted disease)        Past Surgical History:   Procedure Laterality Date     APPENDECTOMY      age 13     DILATION AND CURETTAGE, DIAGNOSTIC / THERAPEUTIC      age 38, for endometriosis       Social History     Socioeconomic History     Marital status:      Spouse name: Not on file     Number of children: Not on file     Years of education: Not on file     Highest education level: Not on file   Occupational History     Not on file   Tobacco Use     Smoking status: Never     Smokeless tobacco: Never   Vaping Use     Vaping Use: Never used   Substance and Sexual Activity     Alcohol use: Yes     Alcohol/week: 7.0 standard drinks     Drug use: No     Sexual activity: Not Currently     Partners: Male   Other Topics Concern     Not on file   Social History Narrative     27 years.   Has 2 children from 's first marriage. Has 3 grandchildren.   She has not had any pregnancy.  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    She watches her grandTNCds 2 days a week. Has 4th grandchild on the way    12/13/21    [facility]  "Determinants of Health     Financial Resource Strain: Not on file   Food Insecurity: Not on file   Transportation Needs: Not on file   Physical Activity: Not on file   Stress: Not on file   Social Connections: Not on file   Intimate Partner Violence: Not on file   Housing Stability: Not on file       Family History   Problem Relation Age of Onset     Osteoporosis Mother      Cerebrovascular Disease Mother 74.00     Hyperlipidemia Father      Dementia Father      Breast Cancer Maternal Grandmother        Allergies   Allergen Reactions     Doxycycline GI Disturbance     Abdominal ileus     Alendronic Acid GI Disturbance     Other reaction(s): GI Upset       Clindamycin      Rash, see note from allergy 5/3/21     Gluten Meal GI Disturbance     Celiac disease  Celiac disease       Lac Bovis      Went Dairy Free in April related to GI issues     Risedronate GI Disturbance     Cephalosporins Rash     Ibuprofen      Nitrofuran Derivatives Other (See Comments) and Rash     Overwhelming nausea    Overwhelming nausea       Current Outpatient Medications   Medication     BD SYRINGE SLIP TIP 25G X 5/8\" 1 ML MISC     cyanocobalamin (CYANOCOBALAMIN) 1000 MCG/ML injection     cyclobenzaprine (FLEXERIL) 5 MG tablet     diltiazem 2% in PLO gel     estradiol (ESTRACE) 0.1 MG/GM vaginal cream     hyoscyamine (LEVSIN) 0.125 MG tablet     Lactobacillus Rhamnosus, GG, (RA PROBIOTIC DIGESTIVE CARE) CAPS     latanoprost (XALATAN) 0.005 % ophthalmic solution     METAMUCIL FIBER PO     metroNIDAZOLE (METROCREAM) 0.75 % external cream     polyethylene glycol (MIRALAX) 17 GM/Dose powder     pravastatin (PRAVACHOL) 40 MG tablet     valACYclovir (VALTREX) 500 MG tablet     VITAMIN D PO     No current facility-administered medications for this visit.         PEx:   Blood pressure 122/70, pulse 79, height 1.652 m (5' 5.05\"), weight 65.3 kg (144 lb), SpO2 96 %, not currently breastfeeding.    PSYCH: NAD  EYES: EOMI  MOUTH: MMM  NEURO: AAO " x3    Urine: trace blood      A/P: Francisca Williamson is a 67 year old female with abnormal UAs, but normal micro exams (x2).   -UAs and micro exams reviewed with the patient. She does not meet criteria for microscopic hematuria at this time.   -UA with micro today. Will call her with those results.   -UA with micro with annual exams. If >2 RBCs/HPF or gross hematuria, she should be referred back.  -Continue with topical estrogen 3 x per week.     Shanell Booker PA-C  [facility]        28 minutes spent on the date of the encounter doing chart review, review of outside records, review of test results, interpretation of tests, patient visit and documentation     CC: Dr. Melchor, PCP

## 2023-01-13 NOTE — LETTER
1/13/2023       RE: Francisca Williamson  [address]  [facility] [address]     Dear Colleague,    Thank you for referring your patient, Francisca Williamson, to the [facility] AGA at [facility]. Please see a copy of my visit note below.    CC: abnormal UA    HPI:  Francisca Williamson is a 67 year old female who presents in consultation from Dr. Westfall for evaluation of the above. Has pain at the end of urination last July. UA with small blood (no micro exam). UC mixed. Repeat UA with PCP in Aug and UA with micro normal. Returned in Nov for UA with Dr. Westfall and UA with small blood (no micro exam) and UC again neg. Increased her topical estrogen. Can have symptoms intermittently. UA today with trace blood.     No gross hematuria.     Past Medical History:   Diagnosis Date     B12 deficiency      Celiac disease      Chronic idiopathic constipation 11/08/2021     Diverticulitis      Diverticulosis      GERD (gastroesophageal reflux disease)     states main problem is heartburn     High cholesterol      Mumps      Osteoporosis      Shingles 01/01/2005     Spider veins      STD (sexually transmitted disease)        Past Surgical History:   Procedure Laterality Date     APPENDECTOMY      age 13     DILATION AND CURETTAGE, DIAGNOSTIC / THERAPEUTIC      age 38, for endometriosis       Social History     Socioeconomic History     Marital status:      Spouse name: Not on file     Number of children: Not on file     Years of education: Not on file     Highest education level: Not on file   Occupational History     Not on file   Tobacco Use     Smoking status: Never     Smokeless tobacco: Never   Vaping Use     Vaping Use: Never used   Substance and Sexual Activity     Alcohol use: Yes     Alcohol/week: 7.0 standard drinks     Drug use: No     Sexual activity: Not Currently     Partners: Male   Other Topics Concern     Not on file   Social History Narrative     " 27 years.   Has 2 children from 's first marriage. Has 3 grandchildren.   She has not had any pregnancy.  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 Has 4th grandchild on the way    12/13/21    Dorota ANNELISE [facility]       Social Determinants of Health     Financial Resource Strain: Not on file   Food Insecurity: Not on file   Transportation Needs: Not on file   Physical Activity: Not on file   Stress: Not on file   Social Connections: Not on file   Intimate Partner Violence: Not on file   Housing Stability: Not on file       Family History   Problem Relation Age of Onset     Osteoporosis Mother      Cerebrovascular Disease Mother 74.00     Hyperlipidemia Father      Dementia Father      Breast Cancer Maternal Grandmother        Allergies   Allergen Reactions     Doxycycline GI Disturbance     Abdominal ileus     Alendronic Acid GI Disturbance     Other reaction(s): GI Upset       Clindamycin      Rash, see note from allergy 5/3/21     Gluten Meal GI Disturbance     Celiac disease  Celiac disease       Lac Bovis      Went Dairy Free in April related to GI issues     Risedronate GI Disturbance     Cephalosporins Rash     Ibuprofen      Nitrofuran Derivatives Other (See Comments) and Rash     Overwhelming nausea    Overwhelming nausea       Current Outpatient Medications   Medication     BD SYRINGE SLIP TIP 25G X 5/8\" 1 ML MISC     cyanocobalamin (CYANOCOBALAMIN) 1000 MCG/ML injection     cyclobenzaprine (FLEXERIL) 5 MG tablet     diltiazem 2% in PLO gel     estradiol (ESTRACE) 0.1 MG/GM vaginal cream     hyoscyamine (LEVSIN) 0.125 MG tablet     Lactobacillus Rhamnosus, GG, (RA PROBIOTIC DIGESTIVE CARE) CAPS     latanoprost (XALATAN) 0.005 % ophthalmic solution     METAMUCIL FIBER PO     metroNIDAZOLE (METROCREAM) 0.75 % external cream     polyethylene glycol (MIRALAX) 17 GM/Dose powder     pravastatin (PRAVACHOL) 40 MG tablet     valACYclovir " "(VALTREX) 500 MG tablet     VITAMIN D PO     No current facility-administered medications for this visit.         PEx:   Blood pressure 122/70, pulse 79, height 1.652 m (5' 5.05\"), weight 65.3 kg (144 lb), SpO2 96 %, not currently breastfeeding.    PSYCH: NAD  EYES: EOMI  MOUTH: MMM  NEURO: AAO x3    Urine: trace blood      A/P: Francisca Williamson is a 67 year old female with abnormal UAs, but normal micro exams (x2).   -UAs and micro exams reviewed with the patient. She does not meet criteria for microscopic hematuria at this time.   -UA with micro today. Will call her with those results.   -UA with micro with annual exams. If >2 RBCs/HPF or gross hematuria, she should be referred back.  -Continue with topical estrogen 3 x per week.     Shanell Booker PA-C  [facility]        28 minutes spent on the date of the encounter doing chart review, review of outside records, review of test results, interpretation of tests, patient visit and documentation     CC: Dr. Melchor, PCP      "

## 2023-01-13 NOTE — PATIENT INSTRUCTIONS
Below is a list of things that can irritate the bladder and should be eliminated/reduced:    Caffeinated soft drinks.  Coffee.  Tea.  Chocolate.  Tomato-based foods.  Acidic juices and fruits. (includes cranberry juice)  Alcohol.  Nicotine  Carbonated drinks.  Aspartame/Nutrasweet.    Continue topical estrogen.     Will obtain Dr. Westfall's records to ensure you do not qualify for urologic eval.

## 2023-02-21 ENCOUNTER — OFFICE VISIT (OUTPATIENT)
Dept: INTERNAL MEDICINE | Facility: CLINIC | Age: 68
End: 2023-02-21
Payer: COMMERCIAL

## 2023-02-21 VITALS
HEIGHT: 65 IN | OXYGEN SATURATION: 96 % | SYSTOLIC BLOOD PRESSURE: 115 MMHG | BODY MASS INDEX: 25.72 KG/M2 | DIASTOLIC BLOOD PRESSURE: 78 MMHG | HEART RATE: 76 BPM | WEIGHT: 154.4 LBS | TEMPERATURE: 97.8 F

## 2023-02-21 DIAGNOSIS — B02.29 POST HERPETIC NEURALGIA: ICD-10-CM

## 2023-02-21 DIAGNOSIS — N64.4 BREAST PAIN, LEFT: Primary | ICD-10-CM

## 2023-02-21 PROCEDURE — 99213 OFFICE O/P EST LOW 20 MIN: CPT | Performed by: INTERNAL MEDICINE

## 2023-02-21 ASSESSMENT — PAIN SCALES - GENERAL: PAINLEVEL: MILD PAIN (3)

## 2023-02-21 NOTE — ASSESSMENT & PLAN NOTE
Patient presents with new onset L breast and nipple pain. Started first weekend in February. Has had 2 instances of sharp pain in the L breast radiating to the nipple and continued nipple tenderness and sensitivity since then. This has never happened before. Last mammo 8/2022 was benign. Breast exam essentially normal today aside from tenderness to palpation of L upper breast and lateral nipple. Given that it is new pain and tenderness will evaluate further as below.   - Diagnostic mammo and L breast US

## 2023-02-21 NOTE — PROGRESS NOTES
Assessment & Plan   Problem List Items Addressed This Visit        Nervous and Auditory    Breast pain, left - Primary     Patient presents with new onset L breast and nipple pain. Started first weekend in February. Has had 2 instances of sharp pain in the L breast radiating to the nipple and continued nipple tenderness and sensitivity since then. This has never happened before. Last mammo 8/2022 was benign. Breast exam essentially normal today aside from tenderness to palpation of L upper breast and lateral nipple. Given that it is new pain and tenderness will evaluate further as below.   - Diagnostic mammo and L breast US          Relevant Orders    MA Diagnostic Digital Bilateral    US Breast Left Limited 1-3 Quadrants    Post herpetic neuralgia     Patient had shingles over the summer.  She has had intermittent postherpetic neuralgia since then.  It is getting better recently.  She does not think she needs any gabapentin or medication today.  We will continue to monitor.             Ordering of each unique test  I spent a total of 29 minutes on the day of the visit.   Time spent doing chart review, history and exam, documentation and further activities per the note      Return for as scheduled in June.    Melva Melchor MD  [facility] HUGO Espinosa is a 67 year old, presenting for the following health issues:  Breast Pain (Nipple pain)    Breast and nipple pain: Started week of February.  She had a few episodes where she had cute onset left breast pain that radiated to the nipple.  She said it felt like a sharp stabbing nerve pain.  It was different than her typical costochondritis and her left chest.  The episodes last up to 30 minutes and then go away.  However since this started she has continued to have L nipple sensitivity and tenderness.  No nipple discharge.  No masses felt on her own self-exam.    ?Neuralgia: She had shingles over the summer and has had  "intermittent nerve-like pain on and off since then.  Over the same timeframe this nerve type pain was a little bit worse, she says it is a getting better today.  Does not think it is bad enough that she would need gabapentin.    Review of Systems   Constitutional, HEENT, cardiovascular, pulmonary, gi and gu systems are negative, except as otherwise noted.      Objective    /78 (BP Location: Right arm, Patient Position: Sitting, Cuff Size: Adult Regular)   Pulse 76   Temp 97.8  F (36.6  C) (Oral)   Ht 1.652 m (5' 5.05\")   Wt 70 kg (154 lb 6.4 oz)   SpO2 96%   BMI 25.65 kg/m    Body mass index is 25.65 kg/m .  Physical Exam   GENERAL: healthy, alert and no distress  EYES: Eyes grossly normal to inspection, PERRL and conjunctivae and sclerae normal  HENT: ear canals and TM's normal, nose and mouth without ulcers or lesions  NECK: no adenopathy, no asymmetry, masses, or scars and thyroid normal to palpation  RESP: lungs clear to auscultation - no rales, rhonchi or wheezes  BREAST: no masses or nipple discharge and no palpable axillary masses or adenopathy. +tenderness to palpation upper L breast and lateral L nipple  CV: regular rate and rhythm, normal S1 S2, no S3 or S4, no murmur, click or rub, no peripheral edema and peripheral pulses strong  ABDOMEN: soft, nontender, no hepatosplenomegaly, no masses and bowel sounds normal  MS: no gross musculoskeletal defects noted, no edema  SKIN: no suspicious lesions or rashes  NEURO: Normal strength and tone, mentation intact and speech normal  PSYCH: mentation appears normal, affect normal/bright              Answers for HPI/ROS submitted by the patient on 2/14/2023  How many servings of fruits and vegetables do you eat daily?: 4 or more  On average, how many sweetened beverages do you drink each day (Examples: soda, juice, sweet tea, etc.  Do NOT count diet or artificially sweetened beverages)?: 0  How many minutes a day do you exercise enough to make your heart " beat faster?: 30 to 60  How many days a week do you exercise enough to make your heart beat faster?: 7  How many days per week do you miss taking your medication?: 0  What is the reason for your visit today?: pain in left breast/nipple  When did your symptoms begin?: 1-2 weeks ago  What are your symptoms?: pain/tingling/discomfort  How would you describe these symptoms?: Moderate  Are your symptoms:: Staying the same  Have you had these symptoms before?: No  Is there anything that makes you feel worse?: if experiencing costochondritis  Is there anything that makes you feel better?: the pain dissipates gradually

## 2023-02-21 NOTE — ASSESSMENT & PLAN NOTE
Patient had shingles over the summer.  She has had intermittent postherpetic neuralgia since then.  It is getting better recently.  She does not think she needs any gabapentin or medication today.  We will continue to monitor.

## 2023-02-27 ENCOUNTER — ANCILLARY PROCEDURE (OUTPATIENT)
Dept: BONE DENSITY | Facility: CLINIC | Age: 68
End: 2023-02-27
Attending: INTERNAL MEDICINE
Payer: COMMERCIAL

## 2023-02-27 PROCEDURE — 77080 DXA BONE DENSITY AXIAL: CPT | Mod: TC | Performed by: RADIOLOGY

## 2023-03-07 ENCOUNTER — MYC MEDICAL ADVICE (OUTPATIENT)
Dept: INTERNAL MEDICINE | Facility: CLINIC | Age: 68
End: 2023-03-07
Payer: COMMERCIAL

## 2023-03-16 ENCOUNTER — ANCILLARY PROCEDURE (OUTPATIENT)
Dept: MAMMOGRAPHY | Facility: CLINIC | Age: 68
End: 2023-03-16
Attending: INTERNAL MEDICINE
Payer: COMMERCIAL

## 2023-03-16 DIAGNOSIS — N64.4 BREAST PAIN, LEFT: ICD-10-CM

## 2023-03-16 PROCEDURE — 76642 ULTRASOUND BREAST LIMITED: CPT | Mod: LT

## 2023-03-16 PROCEDURE — 77062 BREAST TOMOSYNTHESIS BI: CPT

## 2023-03-26 ENCOUNTER — HEALTH MAINTENANCE LETTER (OUTPATIENT)
Age: 68
End: 2023-03-26

## 2023-04-13 ENCOUNTER — LAB (OUTPATIENT)
Dept: LAB | Facility: CLINIC | Age: 68
End: 2023-04-13
Payer: COMMERCIAL

## 2023-04-13 DIAGNOSIS — M81.0 OSTEOPOROSIS, UNSPECIFIED OSTEOPOROSIS TYPE, UNSPECIFIED PATHOLOGICAL FRACTURE PRESENCE: ICD-10-CM

## 2023-04-13 LAB
ALBUMIN SERPL BCG-MCNC: 4.5 G/DL (ref 3.5–5.2)
ALP SERPL-CCNC: 59 U/L (ref 35–104)
ALT SERPL W P-5'-P-CCNC: 14 U/L (ref 10–35)
ANION GAP SERPL CALCULATED.3IONS-SCNC: 9 MMOL/L (ref 7–15)
AST SERPL W P-5'-P-CCNC: 26 U/L (ref 10–35)
BILIRUB SERPL-MCNC: 0.3 MG/DL
BUN SERPL-MCNC: 14 MG/DL (ref 8–23)
CALCIUM SERPL-MCNC: 9.7 MG/DL (ref 8.8–10.2)
CHLORIDE SERPL-SCNC: 103 MMOL/L (ref 98–107)
CREAT SERPL-MCNC: 0.96 MG/DL (ref 0.51–0.95)
DEPRECATED CALCIDIOL+CALCIFEROL SERPL-MC: 50 UG/L (ref 20–75)
DEPRECATED HCO3 PLAS-SCNC: 27 MMOL/L (ref 22–29)
GFR SERPL CREATININE-BSD FRML MDRD: 64 ML/MIN/1.73M2
GLUCOSE SERPL-MCNC: 92 MG/DL (ref 70–99)
PHOSPHATE SERPL-MCNC: 3.8 MG/DL (ref 2.5–4.5)
POTASSIUM SERPL-SCNC: 4.4 MMOL/L (ref 3.4–5.3)
PROT SERPL-MCNC: 7.5 G/DL (ref 6.4–8.3)
PTH-INTACT SERPL-MCNC: 48 PG/ML (ref 15–65)
SODIUM SERPL-SCNC: 139 MMOL/L (ref 136–145)

## 2023-04-13 PROCEDURE — 83970 ASSAY OF PARATHORMONE: CPT

## 2023-04-13 PROCEDURE — 80053 COMPREHEN METABOLIC PANEL: CPT

## 2023-04-13 PROCEDURE — 84100 ASSAY OF PHOSPHORUS: CPT

## 2023-04-13 PROCEDURE — 82306 VITAMIN D 25 HYDROXY: CPT

## 2023-04-13 PROCEDURE — 36415 COLL VENOUS BLD VENIPUNCTURE: CPT

## 2023-04-13 ASSESSMENT — ENCOUNTER SYMPTOMS
TASTE DISTURBANCE: 0
SINUS PAIN: 1
DIARRHEA: 0
VOMITING: 0
NECK PAIN: 0
SMELL DISTURBANCE: 0
RECTAL PAIN: 0
EYE PAIN: 0
DOUBLE VISION: 0
NECK MASS: 0
MYALGIAS: 1
HOARSE VOICE: 0
EYE IRRITATION: 0
ABDOMINAL PAIN: 1
MUSCLE WEAKNESS: 0
BLOOD IN STOOL: 0
MUSCLE CRAMPS: 0
EYE WATERING: 0
ARTHRALGIAS: 0
JOINT SWELLING: 0
HEARTBURN: 1
BOWEL INCONTINENCE: 0
BLOATING: 1
CONSTIPATION: 1
SINUS CONGESTION: 0
NAUSEA: 1
TROUBLE SWALLOWING: 0
SORE THROAT: 0
JAUNDICE: 0
STIFFNESS: 1
EYE REDNESS: 0
BACK PAIN: 1

## 2023-04-19 ENCOUNTER — OFFICE VISIT (OUTPATIENT)
Dept: ENDOCRINOLOGY | Facility: CLINIC | Age: 68
End: 2023-04-19
Payer: COMMERCIAL

## 2023-04-19 VITALS
WEIGHT: 153 LBS | HEART RATE: 60 BPM | BODY MASS INDEX: 25.42 KG/M2 | DIASTOLIC BLOOD PRESSURE: 70 MMHG | SYSTOLIC BLOOD PRESSURE: 106 MMHG

## 2023-04-19 DIAGNOSIS — M81.0 OSTEOPOROSIS, UNSPECIFIED OSTEOPOROSIS TYPE, UNSPECIFIED PATHOLOGICAL FRACTURE PRESENCE: Primary | ICD-10-CM

## 2023-04-19 DIAGNOSIS — K58.1 IRRITABLE BOWEL SYNDROME WITH CONSTIPATION: ICD-10-CM

## 2023-04-19 DIAGNOSIS — K90.0 CELIAC DISEASE: ICD-10-CM

## 2023-04-19 PROCEDURE — 99215 OFFICE O/P EST HI 40 MIN: CPT | Performed by: INTERNAL MEDICINE

## 2023-04-19 RX ORDER — TIMOLOL MALEATE 2.5 MG/ML
1 SOLUTION/ DROPS OPHTHALMIC EVERY MORNING
COMMUNITY
End: 2024-05-01

## 2023-04-19 RX ORDER — HEPARIN SODIUM (PORCINE) LOCK FLUSH IV SOLN 100 UNIT/ML 100 UNIT/ML
5 SOLUTION INTRAVENOUS
Status: CANCELLED | OUTPATIENT
Start: 2023-04-19

## 2023-04-19 RX ORDER — EPINEPHRINE 1 MG/ML
0.3 INJECTION, SOLUTION, CONCENTRATE INTRAVENOUS EVERY 5 MIN PRN
Status: CANCELLED | OUTPATIENT
Start: 2023-04-19

## 2023-04-19 RX ORDER — ALBUTEROL SULFATE 90 UG/1
1-2 AEROSOL, METERED RESPIRATORY (INHALATION)
Status: CANCELLED
Start: 2023-04-19

## 2023-04-19 RX ORDER — HEPARIN SODIUM,PORCINE 10 UNIT/ML
5 VIAL (ML) INTRAVENOUS
Status: CANCELLED | OUTPATIENT
Start: 2023-04-19

## 2023-04-19 RX ORDER — ZOLEDRONIC ACID 5 MG/100ML
5 INJECTION, SOLUTION INTRAVENOUS ONCE
Status: CANCELLED
Start: 2023-04-19

## 2023-04-19 RX ORDER — ALBUTEROL SULFATE 0.83 MG/ML
2.5 SOLUTION RESPIRATORY (INHALATION)
Status: CANCELLED | OUTPATIENT
Start: 2023-04-19

## 2023-04-19 RX ORDER — DIPHENHYDRAMINE HYDROCHLORIDE 50 MG/ML
50 INJECTION INTRAMUSCULAR; INTRAVENOUS
Status: CANCELLED
Start: 2023-04-19

## 2023-04-19 RX ORDER — METHYLPREDNISOLONE SODIUM SUCCINATE 125 MG/2ML
125 INJECTION, POWDER, LYOPHILIZED, FOR SOLUTION INTRAMUSCULAR; INTRAVENOUS
Status: CANCELLED
Start: 2023-04-19

## 2023-04-19 NOTE — PROGRESS NOTES
ENDOCRINOLOGY FOLLOW-UP      HISTORY OF PRESENT ILLNESS    Francisca Williamson is seen in follow-up.    No falls or fractures since last visit.    Had DXA scan completed prior to this visit on 2/27/2023.  I personally reviewed images.  -L1-L4 T score -2.8, 5.3% decline in BMD compared to prior study in 2021 (significant).  -Left femoral neck T score -1.2, left total hip T score -1.4.  -Right femoral neck T score -1.2, right total hip T score -1.5.  Total mean hip 3.7% decline in BMD compared to 2021 (significant).    It has been challenging to maintain sufficient dietary calcium intake due to gastrointestinal issues: Calcium supplement has not been tolerable.  However, she finds that she is now on a regimen that is tolerable and sustainable, getting at least 1200 mg of calcium per day from food.    Has adjusted vitamin D3 supplement to 3000 IU daily as we discussed.    Still seeing the dentist regularly: May be having a root canal (we will be getting an opinion on this) but not anticipating other invasive oral surgery.    Pertinent endocrine and related history:  1.  Osteoporosis.  Previously diagnosed with osteoporosis and was initially treated with Fosamax which resulted in heartburn.  Therefore, she was transitioned to Reclast.  Some progress notes indicate she was treated with 3 doses of Reclast.  However, the patient recalls she may have received 2 doses.  Records in epic also indicate that she received a dose in 2014 and 2015: I do not see documentation for dose in 2016.  She tolerated Reclast without side effect.  -DXA scan completed on 1/26/2021:  Lumbar spine, L1-L4 T score -2.3, no comparison study.  Left femoral neck T score -0.9, left total hip T score -1.2.  Right femoral neck T score -0.8, right total hip T score -1.2.  -DXA scan completed at [facility] on 12/16/2016 showed osteopenia.  L1-L4 T score -0.7  Left femoral neck T score -0.6, left total hip T score -1  Right femoral neck T score -0.4,  "right total hip T score -0.9.  At that time, FRAX 10-year probability of major osteoporotic fracture was 12.8%, hip fracture 0.8%.  -No history of fracture.  2.  Celiac disease.  Follows strict gluten free diet. Also following a dairy free diet.    Pertinent Social History: , stepchildren. 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    PAST MEDICAL HISTORY  Past Medical History:   Diagnosis Date     B12 deficiency      Celiac disease      Chronic idiopathic constipation 11/08/2021     Diverticulitis      Diverticulosis      GERD (gastroesophageal reflux disease)     states main problem is heartburn     High cholesterol      Mumps      Osteoporosis      Shingles 01/01/2005     Spider veins      STD (sexually transmitted disease)        MEDICATIONS  Current Outpatient Medications   Medication Sig Dispense Refill     BD SYRINGE SLIP TIP 25G X 5/8\" 1 ML MISC FOR HOME  each 0     cyanocobalamin (CYANOCOBALAMIN) 1000 MCG/ML injection INJECT 1 ML INTO THE SHOULDER, THIGH, OR BUTTOCKS, EVERY 30 DAYS 3 mL 3     cyclobenzaprine (FLEXERIL) 5 MG tablet Take 1 tablet (5 mg) by mouth 3 times daily as needed for muscle spasms 30 tablet 1     diltiazem 2% in PLO gel Apply pea sized amount 2 times daily as needed 30 g 0     estradiol (ESTRACE) 0.1 MG/GM vaginal cream Place vaginally three times a week 42.5 g 4     hyoscyamine (LEVSIN) 0.125 MG tablet TAKE 1 TABLET BY MOUTH UP TO FOUR TIMES DAILY AS NEEDED FOR SPASMS       Lactobacillus Rhamnosus, GG, ( PROBIOTIC DIGESTIVE CARE) CAPS Take 1 capsule by mouth       latanoprost (XALATAN) 0.005 % ophthalmic solution INT 1 GTT IN EACH EYE ONCE D       METAMUCIL FIBER PO        metroNIDAZOLE (METROCREAM) 0.75 % external cream Apply topically 2 times daily 45 g 1     polyethylene glycol (MIRALAX) 17 GM/Dose powder Take 17 g by mouth       pravastatin (PRAVACHOL) 40 MG tablet Take 1 tablet (40 mg) by mouth daily 90 tablet 3     valACYclovir (VALTREX) 500 MG tablet TAKE 1 " TABLET(500 MG) BY MOUTH TWICE DAILY FOR 3 DAYS 6 tablet 3     VITAMIN D PO Take 2,000 Units by mouth         Allergies, family, and social history were reviewed and documented as needed in EHR.     REVIEW OF SYSTEMS  A focused ROS was performed, with pertinent positives and negatives as noted in the HPI.    PHYSICAL EXAM  /70 (BP Location: Right arm, Patient Position: Sitting, Cuff Size: Adult Large)   Pulse 60   Wt 69.4 kg (153 lb)   BMI 25.42 kg/m    Body mass index is 25.42 kg/m .  Constitutional: Vital signs reviewed, as recorded above. Patient is alert, oriented and appears in no acute distress.  Eyes: PER, EOMI, no stare, lid lag, or retraction; no conjunctival injection.  Neck: Neck supple, no palpable thyromegaly.  Cardiovascular: RRR, normal S1/S2, no audible murmurs, rubs or gallops, no LE edema.  Respiratory: CTAB, without wheezes, crackles or rhonchi; normal chest wall motion and respiratory effort.  MSK: No pain with palpation over the spine.  No clubbing or cyanosis; normal muscle bulk and tone.  Skin: Normal skin color, temperature, turgor and texture.  Neurological: Alert and oriented times 3. No tremor.    DATA REVIEW  Each of the following laboratory and/or imaging studies were reviewed.    DXA as in HPI.    Component      Latest Ref Rng 3/13/2022  4:07 PM   Creatinine Urine      mg/dL 26    Calcium Urine mg/dL      mg/dL <2.0    Calcium Urine g/24 h --    Calcium Urine g/g Cr --    Duration in hours      h 24.0    Duration in hours      h 24.0    Volume in mL      mL 3,000    Volume in mL      mL 3,000    Creatinine Urine Timed      0.80 - 1.80 g/spec 0.78 (L)    Protein Random Urine      mg/dL    ELP Interpretation Urine    Sodium Urine mmol/L      mmol/L 28    Sodium Urine mmol/24 h      40 - 217 mmol/spec 84       Legend:  (L) Low    Component      Latest Ref Rng 3/9/2022  4:57 PM   Sodium      136 - 145 mmol/L 139    Potassium      3.5 - 5.0 mmol/L 4.2    Chloride      98 - 107 mmol/L  103    Carbon Dioxide      22 - 31 mmol/L 24    Anion Gap      5 - 18 mmol/L 12    Urea Nitrogen      8 - 22 mg/dL 14    Creatinine      0.60 - 1.10 mg/dL 0.86    Calcium      8.5 - 10.5 mg/dL 9.8    Glucose      70 - 125 mg/dL 117    Alkaline Phosphatase      45 - 120 U/L 78    AST      0 - 40 U/L 23    ALT      0 - 45 U/L 16    Protein Total      6.0 - 8.0 g/dL 7.5    Albumin      3.5 - 5.0 g/dL 4.4    Bilirubin Total      0.0 - 1.0 mg/dL 0.3    GFR Estimate      >60 mL/min/1.73m2 73    Albumin %      51.0 - 67.0 % 65.1    Albumin Fraction      3.2 - 4.7 g/dL 4.9 (H)    Alpha 1 %      2.0 - 4.0 % 2.1    Alpha 1 Fraction      0.1 - 0.3 g/dL 0.2    Alpha 2 %      5.0 - 13.0 % 9.5    Alpha 2 Fraction      0.4 - 0.9 g/dL 0.7    Beta %      10.0 - 17.0 % 10.2    Beta Fraction      0.7 - 1.2 g/dL 0.8    Gamma Globulin %      9.0 - 20.0 % 13.1    Gamma Fraction      0.6 - 1.4 g/dL 1.0    ELP Interpretation: Increased albumin, which can be seen in dehydration among other disorders. Albumin levels may be elevated in dehydration, congestive heart failure, poor protein utilization, glucocorticoid excess, and congenital causes among possibilities. Clinical correlation is required.    Path ICD M81.0    Path ICD M81.0    Interpreted By Pema Hauser MD    Interpreted By Pema Hauser MD    Total Protein Serum for ELP      g/dL 7.5    Creatinine Urine      mg/dL 39    Protein Random Urine      mg/dL <7    ELP Interpretation Urine Unremarkable protein electrophoresis.    Microalbumin Urine mg/dL      0.00 - 1.99 mg/dL <0.50    Microalbumin Urine mg/g Cr --    Vitamin D Deficiency screening      30 - 80 ug/L 41    TSH      0.30 - 5.00 uIU/mL 2.94    Phosphorus      2.5 - 4.5 mg/dL 3.6    Parathyroid Hormone Intact      10 - 86 pg/mL 78    Total Protein Serum for ELP      6.0 - 8.0 g/dL 7.5       Legend:  (H) High  Component      Latest Ref Rng 8/17/2022  8:52 AM 4/13/2023  8:27 AM   Sodium      136 - 145 mmol/L  139     Potassium      3.4 - 5.3 mmol/L  4.4    Chloride      98 - 107 mmol/L  103    Carbon Dioxide (CO2)      22 - 29 mmol/L  27    Anion Gap      7 - 15 mmol/L  9    Urea Nitrogen      8.0 - 23.0 mg/dL  14.0    Creatinine      0.51 - 0.95 mg/dL  0.96 (H)    Calcium      8.8 - 10.2 mg/dL  9.7    Glucose      70 - 99 mg/dL  92    Alkaline Phosphatase      35 - 104 U/L  59    AST      10 - 35 U/L  26    ALT      10 - 35 U/L  14    Protein Total      6.4 - 8.3 g/dL  7.5    Albumin      3.5 - 5.2 g/dL  4.5    Bilirubin Total      <=1.2 mg/dL  0.3    GFR Estimate      >60 mL/min/1.73m2  64    Calcium Urine mg/dL      mg/dL 2.4     Duration in hours      h 24.0     Volume in mL      mL 5,150     Calcium Urine g/24 h      0.10 - 0.30 g/spec 0.12     Phosphorus      2.5 - 4.5 mg/dL  3.8    Parathyroid Hormone Intact      15 - 65 pg/mL  48    Vitamin D Deficiency screening      20 - 75 ug/L  50       Legend:  (H) High      ASSESSMENT  1.  Osteoporosis.  Without history of low trauma fracture, nor occult fracture on spine x-rays.  Potentially related to postmenopausal status and history of celiac disease with insufficient calcium intake/absorption--we noted hypocalciuria at initial work-up, with improvement in urine calcium excretion after adjusting dietary calcium intake.  Our work-up for other secondary causes of osteoporosis has otherwise been unremarkable (normal SPEP/UPEP, thyroid function tests, PTH, CMP).  Follow-up DXA scan performed in 2/2023 shows osteoporosis with significant decline in BMD at spine and hips: Would therefore recommend resumption of therapy.  Recommend Reclast (she has received 2 doses in the past and has tolerated without side effect, with last dose in 2015).  Ms. Williamson is agreeable to this plan: We discussed benefits and potential side effects of IV bisphosphonate therapy including more common side effects such as infusion reaction and arthralgias and rare side effects such as ONJ and AFF.  We will  anticipate follow-up in clinic in 1 year to coordinate next dose of Reclast; anticipate DXA scan in 2/2025.    2.  Celiac disease.  Following strict gluten-free diet.    PLAN  -Continue to aim for at least 1200 mg of calcium from diet  -Continue vitamin D at 3000 IU daily  -Referral for Reclast infusion  -Return for a follow-up visit in one year, with labs before visit  -We will communicate results via MyChart, or if needed by phone      Orders Placed This Encounter   Procedures     Comprehensive metabolic panel     Vitamin D Deficiency     Parathyroid Hormone Intact     TSH with free T4 reflex     I spent a total of 43 minutes on the date of encounter reviewing medical records, evaluating the patient, coordinating care and documenting in the EHR, as detailed above.      Rajwinder Rucker MD   Division of Diabetes, Endocrinology and Metabolism  Department of Medicine

## 2023-04-19 NOTE — LETTER
4/19/2023         RE: Francisca Williamson  [address]  [place] [address]        Dear Colleague,    Thank you for referring your patient, Francisca Williamson, to the [facility]. Please see a copy of my visit note below.      ENDOCRINOLOGY FOLLOW-UP      HISTORY OF PRESENT ILLNESS    Francisca Williamson is seen in follow-up.    No falls or fractures since last visit.    Had DXA scan completed prior to this visit on 2/27/2023.  I personally reviewed images.  -L1-L4 T score -2.8, 5.3% decline in BMD compared to prior study in 2021 (significant).  -Left femoral neck T score -1.2, left total hip T score -1.4.  -Right femoral neck T score -1.2, right total hip T score -1.5.  Total mean hip 3.7% decline in BMD compared to 2021 (significant).    It has been challenging to maintain sufficient dietary calcium intake due to gastrointestinal issues: Calcium supplement has not been tolerable.  However, she finds that she is now on a regimen that is tolerable and sustainable, getting at least 1200 mg of calcium per day from food.    Has adjusted vitamin D3 supplement to 3000 IU daily as we discussed.    Still seeing the dentist regularly: May be having a root canal (we will be getting an opinion on this) but not anticipating other invasive oral surgery.    Pertinent endocrine and related history:  1.  Osteoporosis.  Previously diagnosed with osteoporosis and was initially treated with Fosamax which resulted in heartburn.  Therefore, she was transitioned to Reclast.  Some progress notes indicate she was treated with 3 doses of Reclast.  However, the patient recalls she may have received 2 doses.  Records in epic also indicate that she received a dose in 2014 and 2015: I do not see documentation for dose in 2016.  She tolerated Reclast without side effect.  -DXA scan completed on 1/26/2021:  Lumbar spine, L1-L4 T score -2.3, no comparison study.  Left femoral neck T score -0.9, left total hip T score -1.2.  Right  "femoral neck T score -0.8, right total hip T score -1.2.  -DXA scan completed at [facility] on 12/16/2016 showed osteopenia.  L1-L4 T score -0.7  Left femoral neck T score -0.6, left total hip T score -1  Right femoral neck T score -0.4, right total hip T score -0.9.  At that time, FRAX 10-year probability of major osteoporotic fracture was 12.8%, hip fracture 0.8%.  -No history of fracture.  2.  Celiac disease.  Follows strict gluten free diet. Also following a dairy free diet.    Pertinent Social History: , stepchildren. 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.    PAST MEDICAL HISTORY  Past Medical History:   Diagnosis Date     B12 deficiency      Celiac disease      Chronic idiopathic constipation 11/08/2021     Diverticulitis      Diverticulosis      GERD (gastroesophageal reflux disease)     states main problem is heartburn     High cholesterol      Mumps      Osteoporosis      Shingles 01/01/2005     Spider veins      STD (sexually transmitted disease)        MEDICATIONS  Current Outpatient Medications   Medication Sig Dispense Refill     BD SYRINGE SLIP TIP 25G X 5/8\" 1 ML MISC FOR HOME  each 0     cyanocobalamin (CYANOCOBALAMIN) 1000 MCG/ML injection INJECT 1 ML INTO THE SHOULDER, THIGH, OR BUTTOCKS, EVERY 30 DAYS 3 mL 3     cyclobenzaprine (FLEXERIL) 5 MG tablet Take 1 tablet (5 mg) by mouth 3 times daily as needed for muscle spasms 30 tablet 1     diltiazem 2% in PLO gel Apply pea sized amount 2 times daily as needed 30 g 0     estradiol (ESTRACE) 0.1 MG/GM vaginal cream Place vaginally three times a week 42.5 g 4     hyoscyamine (LEVSIN) 0.125 MG tablet TAKE 1 TABLET BY MOUTH UP TO FOUR TIMES DAILY AS NEEDED FOR SPASMS       Lactobacillus Rhamnosus, GG, ( PROBIOTIC DIGESTIVE CARE) CAPS Take 1 capsule by mouth       latanoprost (XALATAN) 0.005 % ophthalmic solution INT 1 GTT IN EACH EYE ONCE D       METAMUCIL FIBER PO        metroNIDAZOLE (METROCREAM) 0.75 % external cream " Apply topically 2 times daily 45 g 1     polyethylene glycol (MIRALAX) 17 GM/Dose powder Take 17 g by mouth       pravastatin (PRAVACHOL) 40 MG tablet Take 1 tablet (40 mg) by mouth daily 90 tablet 3     valACYclovir (VALTREX) 500 MG tablet TAKE 1 TABLET(500 MG) BY MOUTH TWICE DAILY FOR 3 DAYS 6 tablet 3     VITAMIN D PO Take 2,000 Units by mouth         Allergies, family, and social history were reviewed and documented as needed in EHR.     REVIEW OF SYSTEMS  A focused ROS was performed, with pertinent positives and negatives as noted in the HPI.    PHYSICAL EXAM  /70 (BP Location: Right arm, Patient Position: Sitting, Cuff Size: Adult Large)   Pulse 60   Wt 69.4 kg (153 lb)   BMI 25.42 kg/m    Body mass index is 25.42 kg/m .  Constitutional: Vital signs reviewed, as recorded above. Patient is alert, oriented and appears in no acute distress.  Eyes: PER, EOMI, no stare, lid lag, or retraction; no conjunctival injection.  Neck: Neck supple, no palpable thyromegaly.  Cardiovascular: RRR, normal S1/S2, no audible murmurs, rubs or gallops, no LE edema.  Respiratory: CTAB, without wheezes, crackles or rhonchi; normal chest wall motion and respiratory effort.  MSK: No pain with palpation over the spine.  No clubbing or cyanosis; normal muscle bulk and tone.  Skin: Normal skin color, temperature, turgor and texture.  Neurological: Alert and oriented times 3. No tremor.    DATA REVIEW  Each of the following laboratory and/or imaging studies were reviewed.    DXA as in HPI.    Component      Latest Ref Rng 3/13/2022  4:07 PM   Creatinine Urine      mg/dL 26    Calcium Urine mg/dL      mg/dL <2.0    Calcium Urine g/24 h --    Calcium Urine g/g Cr --    Duration in hours      h 24.0    Duration in hours      h 24.0    Volume in mL      mL 3,000    Volume in mL      mL 3,000    Creatinine Urine Timed      0.80 - 1.80 g/spec 0.78 (L)    Protein Random Urine      mg/dL    ELP Interpretation Urine    Sodium Urine  mmol/L      mmol/L 28    Sodium Urine mmol/24 h      40 - 217 mmol/spec 84       Legend:  (L) Low    Component      Latest Ref Rng 3/9/2022  4:57 PM   Sodium      136 - 145 mmol/L 139    Potassium      3.5 - 5.0 mmol/L 4.2    Chloride      98 - 107 mmol/L 103    Carbon Dioxide      22 - 31 mmol/L 24    Anion Gap      5 - 18 mmol/L 12    Urea Nitrogen      8 - 22 mg/dL 14    Creatinine      0.60 - 1.10 mg/dL 0.86    Calcium      8.5 - 10.5 mg/dL 9.8    Glucose      70 - 125 mg/dL 117    Alkaline Phosphatase      45 - 120 U/L 78    AST      0 - 40 U/L 23    ALT      0 - 45 U/L 16    Protein Total      6.0 - 8.0 g/dL 7.5    Albumin      3.5 - 5.0 g/dL 4.4    Bilirubin Total      0.0 - 1.0 mg/dL 0.3    GFR Estimate      >60 mL/min/1.73m2 73    Albumin %      51.0 - 67.0 % 65.1    Albumin Fraction      3.2 - 4.7 g/dL 4.9 (H)    Alpha 1 %      2.0 - 4.0 % 2.1    Alpha 1 Fraction      0.1 - 0.3 g/dL 0.2    Alpha 2 %      5.0 - 13.0 % 9.5    Alpha 2 Fraction      0.4 - 0.9 g/dL 0.7    Beta %      10.0 - 17.0 % 10.2    Beta Fraction      0.7 - 1.2 g/dL 0.8    Gamma Globulin %      9.0 - 20.0 % 13.1    Gamma Fraction      0.6 - 1.4 g/dL 1.0    ELP Interpretation: Increased albumin, which can be seen in dehydration among other disorders. Albumin levels may be elevated in dehydration, congestive heart failure, poor protein utilization, glucocorticoid excess, and congenital causes among possibilities. Clinical correlation is required.    Path ICD M81.0    Path ICD M81.0    Interpreted By Pema Hauser MD    Interpreted By Pema Hauser MD    Total Protein Serum for ELP      g/dL 7.5    Creatinine Urine      mg/dL 39    Protein Random Urine      mg/dL <7    ELP Interpretation Urine Unremarkable protein electrophoresis.    Microalbumin Urine mg/dL      0.00 - 1.99 mg/dL <0.50    Microalbumin Urine mg/g Cr --    Vitamin D Deficiency screening      30 - 80 ug/L 41    TSH      0.30 - 5.00 uIU/mL 2.94    Phosphorus       2.5 - 4.5 mg/dL 3.6    Parathyroid Hormone Intact      10 - 86 pg/mL 78    Total Protein Serum for ELP      6.0 - 8.0 g/dL 7.5       Legend:  (H) High  Component      Latest Ref Rng 8/17/2022  8:52 AM 4/13/2023  8:27 AM   Sodium      136 - 145 mmol/L  139    Potassium      3.4 - 5.3 mmol/L  4.4    Chloride      98 - 107 mmol/L  103    Carbon Dioxide (CO2)      22 - 29 mmol/L  27    Anion Gap      7 - 15 mmol/L  9    Urea Nitrogen      8.0 - 23.0 mg/dL  14.0    Creatinine      0.51 - 0.95 mg/dL  0.96 (H)    Calcium      8.8 - 10.2 mg/dL  9.7    Glucose      70 - 99 mg/dL  92    Alkaline Phosphatase      35 - 104 U/L  59    AST      10 - 35 U/L  26    ALT      10 - 35 U/L  14    Protein Total      6.4 - 8.3 g/dL  7.5    Albumin      3.5 - 5.2 g/dL  4.5    Bilirubin Total      <=1.2 mg/dL  0.3    GFR Estimate      >60 mL/min/1.73m2  64    Calcium Urine mg/dL      mg/dL 2.4     Duration in hours      h 24.0     Volume in mL      mL 5,150     Calcium Urine g/24 h      0.10 - 0.30 g/spec 0.12     Phosphorus      2.5 - 4.5 mg/dL  3.8    Parathyroid Hormone Intact      15 - 65 pg/mL  48    Vitamin D Deficiency screening      20 - 75 ug/L  50       Legend:  (H) High      ASSESSMENT  1.  Osteoporosis.  Without history of low trauma fracture, nor occult fracture on spine x-rays.  Potentially related to postmenopausal status and history of celiac disease with insufficient calcium intake/absorption--we noted hypocalciuria at initial work-up, with improvement in urine calcium excretion after adjusting dietary calcium intake.  Our work-up for other secondary causes of osteoporosis has otherwise been unremarkable (normal SPEP/UPEP, thyroid function tests, PTH, CMP).  Follow-up DXA scan performed in 2/2023 shows osteoporosis with significant decline in BMD at spine and hips: Would therefore recommend resumption of therapy.  Recommend Reclast (she has received 2 doses in the past and has tolerated without side effect, with last dose  in 2015).  Ms. Williamson is agreeable to this plan: We discussed benefits and potential side effects of IV bisphosphonate therapy including more common side effects such as infusion reaction and arthralgias and rare side effects such as ONJ and AFF.  We will anticipate follow-up in clinic in 1 year to coordinate next dose of Reclast; anticipate DXA scan in 2/2025.    2.  Celiac disease.  Following strict gluten-free diet.    PLAN  -Continue to aim for at least 1200 mg of calcium from diet  -Continue vitamin D at 3000 IU daily  -Referral for Reclast infusion  -Return for a follow-up visit in one year, with labs before visit  -We will communicate results via [employer], or if needed by phone      Orders Placed This Encounter   Procedures     Comprehensive metabolic panel     Vitamin D Deficiency     Parathyroid Hormone Intact     TSH with free T4 reflex     I spent a total of 43 minutes on the date of encounter reviewing medical records, evaluating the patient, coordinating care and documenting in the EHR, as detailed above.      Rishabh Rucker MD   Division of Diabetes, Endocrinology and Metabolism  Department of Medicine          Again, thank you for allowing me to participate in the care of your patient.        Sincerely,        RISHABH Rucker MD

## 2023-04-19 NOTE — PATIENT INSTRUCTIONS
-Continue to aim for at least 1200 mg of calcium from diet  -Continue vitamin D at 3000 IU daily  -Referral for Reclast infusion  -Return for a follow-up visit in one year, with labs before visit  -We will communicate results via [employer], or if needed by phone

## 2023-04-25 ENCOUNTER — INFUSION THERAPY VISIT (OUTPATIENT)
Dept: INFUSION THERAPY | Facility: HOSPITAL | Age: 68
End: 2023-04-25
Payer: COMMERCIAL

## 2023-04-25 VITALS
BODY MASS INDEX: 23.95 KG/M2 | OXYGEN SATURATION: 99 % | RESPIRATION RATE: 16 BRPM | HEART RATE: 55 BPM | TEMPERATURE: 98.1 F | WEIGHT: 149 LBS | HEIGHT: 66 IN | DIASTOLIC BLOOD PRESSURE: 65 MMHG | SYSTOLIC BLOOD PRESSURE: 126 MMHG

## 2023-04-25 DIAGNOSIS — M81.0 OSTEOPOROSIS, UNSPECIFIED OSTEOPOROSIS TYPE, UNSPECIFIED PATHOLOGICAL FRACTURE PRESENCE: Primary | ICD-10-CM

## 2023-04-25 PROCEDURE — 250N000011 HC RX IP 250 OP 636: Performed by: INTERNAL MEDICINE

## 2023-04-25 PROCEDURE — 258N000003 HC RX IP 258 OP 636: Performed by: INTERNAL MEDICINE

## 2023-04-25 PROCEDURE — 96365 THER/PROPH/DIAG IV INF INIT: CPT

## 2023-04-25 RX ORDER — HEPARIN SODIUM (PORCINE) LOCK FLUSH IV SOLN 100 UNIT/ML 100 UNIT/ML
5 SOLUTION INTRAVENOUS
Status: CANCELLED | OUTPATIENT
Start: 2023-04-25

## 2023-04-25 RX ORDER — ALBUTEROL SULFATE 90 UG/1
1-2 AEROSOL, METERED RESPIRATORY (INHALATION)
Status: DISCONTINUED | OUTPATIENT
Start: 2023-04-25 | End: 2023-04-25

## 2023-04-25 RX ORDER — ALBUTEROL SULFATE 0.83 MG/ML
2.5 SOLUTION RESPIRATORY (INHALATION)
Status: CANCELLED | OUTPATIENT
Start: 2023-04-25

## 2023-04-25 RX ORDER — ALBUTEROL SULFATE 90 UG/1
1-2 AEROSOL, METERED RESPIRATORY (INHALATION)
Status: CANCELLED
Start: 2023-04-25

## 2023-04-25 RX ORDER — EPINEPHRINE 1 MG/ML
0.3 INJECTION, SOLUTION INTRAMUSCULAR; SUBCUTANEOUS EVERY 5 MIN PRN
Status: DISCONTINUED | OUTPATIENT
Start: 2023-04-25 | End: 2023-04-25

## 2023-04-25 RX ORDER — METHYLPREDNISOLONE SODIUM SUCCINATE 125 MG/2ML
125 INJECTION, POWDER, LYOPHILIZED, FOR SOLUTION INTRAMUSCULAR; INTRAVENOUS
Status: CANCELLED
Start: 2023-04-25

## 2023-04-25 RX ORDER — ZOLEDRONIC ACID 5 MG/100ML
5 INJECTION, SOLUTION INTRAVENOUS ONCE
Status: CANCELLED
Start: 2023-04-25

## 2023-04-25 RX ORDER — DIPHENHYDRAMINE HYDROCHLORIDE 50 MG/ML
50 INJECTION INTRAMUSCULAR; INTRAVENOUS
Status: CANCELLED
Start: 2023-04-25

## 2023-04-25 RX ORDER — METHYLPREDNISOLONE SODIUM SUCCINATE 125 MG/2ML
125 INJECTION, POWDER, LYOPHILIZED, FOR SOLUTION INTRAMUSCULAR; INTRAVENOUS
Status: DISCONTINUED | OUTPATIENT
Start: 2023-04-25 | End: 2023-04-25

## 2023-04-25 RX ORDER — EPINEPHRINE 1 MG/ML
0.3 INJECTION, SOLUTION INTRAMUSCULAR; SUBCUTANEOUS EVERY 5 MIN PRN
Status: CANCELLED | OUTPATIENT
Start: 2023-04-25

## 2023-04-25 RX ORDER — DIPHENHYDRAMINE HYDROCHLORIDE 50 MG/ML
50 INJECTION INTRAMUSCULAR; INTRAVENOUS
Status: DISCONTINUED | OUTPATIENT
Start: 2023-04-25 | End: 2023-04-25

## 2023-04-25 RX ORDER — ZOLEDRONIC ACID 5 MG/100ML
5 INJECTION, SOLUTION INTRAVENOUS ONCE
Status: COMPLETED | OUTPATIENT
Start: 2023-04-25 | End: 2023-04-25

## 2023-04-25 RX ORDER — HEPARIN SODIUM,PORCINE 10 UNIT/ML
5 VIAL (ML) INTRAVENOUS
Status: CANCELLED | OUTPATIENT
Start: 2023-04-25

## 2023-04-25 RX ORDER — ALBUTEROL SULFATE 0.83 MG/ML
2.5 SOLUTION RESPIRATORY (INHALATION)
Status: DISCONTINUED | OUTPATIENT
Start: 2023-04-25 | End: 2023-04-25

## 2023-04-25 RX ADMIN — ZOLEDRONIC ACID 5 MG: 5 INJECTION, SOLUTION INTRAVENOUS at 11:34

## 2023-04-25 RX ADMIN — SODIUM CHLORIDE 250 ML: 9 INJECTION, SOLUTION INTRAVENOUS at 11:31

## 2023-04-25 ASSESSMENT — PAIN SCALES - GENERAL: PAINLEVEL: NO PAIN (0)

## 2023-04-25 NOTE — PROGRESS NOTES
Infusion Nursing Note:  Francisca Williamson presents today for Reclast  Patient seen by provider today: No   present during visit today: Not Applicable.    Note: Francisca arrived into the infusion ambulatory in a stable condition, VSS. Plan of care reviewed, she verbalized understanding of her plan of care she also acknowledged she is taking Vit D and gets most of her Calcium from diet as she does tolerate calcium in the pill form and her MD knows had Reclast in the past and it was helpful. Had a very recent labs.      Intravenous Access:  Peripheral IV placed.       Lab Results   Component Value Date     04/13/2023    POTASSIUM 4.4 04/13/2023    MAG 2.2 06/21/2021    CR 0.96 (H) 04/13/2023    LOPEZ 9.7 04/13/2023    BILITOTAL 0.3 04/13/2023    ALBUMIN 4.5 04/13/2023    ALT 14 04/13/2023    AST 26 04/13/2023     Results reviewed, labs MET treatment parameters, ok to proceed with treatment.      Post Infusion Assessment:  Patient tolerated infusion without incident.  Site patent and intact, free from redness, edema or discomfort.  No evidence of extravasations.  Access discontinued per protocol.       Discharge Plan:   Discharge instructions reviewed with: Patient.  Patient and/or family verbalized understanding of discharge instructions and all questions answered.  Patient discharged in stable condition accompanied by: self.  Departure Mode: Ambulatory.      Roselyn Vyas RN

## 2023-05-09 ENCOUNTER — IMMUNIZATION (OUTPATIENT)
Dept: FAMILY MEDICINE | Facility: CLINIC | Age: 68
End: 2023-05-09
Payer: COMMERCIAL

## 2023-05-09 PROCEDURE — 91312 COVID-19 BIVALENT 12+ (PFIZER): CPT

## 2023-05-09 PROCEDURE — 0124A COVID-19 BIVALENT 12+ (PFIZER): CPT

## 2023-06-01 DIAGNOSIS — E53.8 B12 DEFICIENCY: ICD-10-CM

## 2023-06-02 RX ORDER — CYANOCOBALAMIN 1000 UG/ML
INJECTION, SOLUTION INTRAMUSCULAR; SUBCUTANEOUS
Qty: 3 ML | Refills: 2 | Status: SHIPPED | OUTPATIENT
Start: 2023-06-02 | End: 2023-09-11

## 2023-06-02 NOTE — TELEPHONE ENCOUNTER
"Last Written Prescription Date:  10/3/2022  Last Fill Quantity: 3ml,  # refills: 3   Last office visit provider:  2/21/23     Requested Prescriptions   Pending Prescriptions Disp Refills     cyanocobalamin (CYANOCOBALAMIN) 1000 MCG/ML injection [Pharmacy Med Name: CYANOCOBALAMIN 1000MCG/ML INJ 1ML] 3 mL 3     Sig: INJECT 1 ML INTO THE SHOULDER, THIGH, OR BUTTOCKS, EVERY 30 DAYS       Vitamin Supplements (Adult) Protocol Passed - 6/2/2023 12:56 PM        Passed - High dose Vitamin D not ordered        Passed - Recent (12 mo) or future (30 days) visit within the authorizing provider's specialty     Patient has had an office visit with the authorizing provider or a provider within the authorizing providers department within the previous 12 mos or has a future within next 30 days. See \"Patient Info\" tab in inbasket, or \"Choose Columns\" in Meds & Orders section of the refill encounter.              Passed - Medication is active on med list             Krystin Stevens RN 06/02/23 12:56 PM  "

## 2023-06-23 ENCOUNTER — OFFICE VISIT (OUTPATIENT)
Dept: INTERNAL MEDICINE | Facility: CLINIC | Age: 68
End: 2023-06-23
Payer: COMMERCIAL

## 2023-06-23 VITALS
HEART RATE: 74 BPM | BODY MASS INDEX: 24.65 KG/M2 | SYSTOLIC BLOOD PRESSURE: 124 MMHG | HEIGHT: 66 IN | OXYGEN SATURATION: 100 % | TEMPERATURE: 97.9 F | DIASTOLIC BLOOD PRESSURE: 62 MMHG | RESPIRATION RATE: 18 BRPM | WEIGHT: 153.4 LBS

## 2023-06-23 DIAGNOSIS — K90.0 CELIAC DISEASE: ICD-10-CM

## 2023-06-23 DIAGNOSIS — L98.9 SKIN LESION: ICD-10-CM

## 2023-06-23 DIAGNOSIS — K58.1 IRRITABLE BOWEL SYNDROME WITH CONSTIPATION: ICD-10-CM

## 2023-06-23 DIAGNOSIS — B00.1 HERPES LABIALIS: ICD-10-CM

## 2023-06-23 DIAGNOSIS — M62.89 PELVIC FLOOR DYSFUNCTION IN FEMALE: ICD-10-CM

## 2023-06-23 DIAGNOSIS — E78.5 HYPERLIPIDEMIA, UNSPECIFIED HYPERLIPIDEMIA TYPE: Primary | ICD-10-CM

## 2023-06-23 DIAGNOSIS — R07.89 CHEST WALL PAIN: ICD-10-CM

## 2023-06-23 DIAGNOSIS — M81.0 AGE-RELATED OSTEOPOROSIS WITHOUT CURRENT PATHOLOGICAL FRACTURE: ICD-10-CM

## 2023-06-23 DIAGNOSIS — E53.8 B12 DEFICIENCY: ICD-10-CM

## 2023-06-23 PROBLEM — F51.01 INSOMNIA, IDIOPATHIC: Status: RESOLVED | Noted: 2019-09-01 | Resolved: 2023-06-23

## 2023-06-23 PROBLEM — N64.4 BREAST PAIN, LEFT: Status: RESOLVED | Noted: 2023-02-21 | Resolved: 2023-06-23

## 2023-06-23 PROBLEM — D12.0 BENIGN NEOPLASM OF CECUM: Status: RESOLVED | Noted: 2021-02-19 | Resolved: 2023-06-23

## 2023-06-23 LAB
ALT SERPL W P-5'-P-CCNC: 18 U/L (ref 0–50)
AST SERPL W P-5'-P-CCNC: 28 U/L (ref 0–45)
CHOLEST SERPL-MCNC: 168 MG/DL
HDLC SERPL-MCNC: 53 MG/DL
LDLC SERPL CALC-MCNC: 106 MG/DL
NONHDLC SERPL-MCNC: 115 MG/DL
TRIGL SERPL-MCNC: 47 MG/DL

## 2023-06-23 PROCEDURE — 99214 OFFICE O/P EST MOD 30 MIN: CPT | Performed by: INTERNAL MEDICINE

## 2023-06-23 PROCEDURE — 80061 LIPID PANEL: CPT | Performed by: INTERNAL MEDICINE

## 2023-06-23 PROCEDURE — 84460 ALANINE AMINO (ALT) (SGPT): CPT | Performed by: INTERNAL MEDICINE

## 2023-06-23 PROCEDURE — 84450 TRANSFERASE (AST) (SGOT): CPT | Performed by: INTERNAL MEDICINE

## 2023-06-23 PROCEDURE — 36415 COLL VENOUS BLD VENIPUNCTURE: CPT | Performed by: INTERNAL MEDICINE

## 2023-06-23 NOTE — ASSESSMENT & PLAN NOTE
Patient likely strained muscle on L chest wall with granddaughter last week. Provided reassurance, should continue to improve over next few weeks.   - Can use PRN voltaren, lidocaine or salonpas along with heating pad

## 2023-06-23 NOTE — ASSESSMENT & PLAN NOTE
Patient has changing skin lesion on LUE. Started as what was likely solar lentigine but in last few weeks now has overlying scale and edges are irregular and erythematous.   - She will call dermatologist to get appt for further evaluation

## 2023-06-23 NOTE — ASSESSMENT & PLAN NOTE
Constipation predominant. Well controlled with daily metamucil and miralax as well as avoiding dairy. Rarely uses PRN hyoscyamine.

## 2023-06-23 NOTE — PATIENT INSTRUCTIONS
Can use diclofenac or voltaren gel along with heating pad. Salonpas or lidocaine cream works to numb a little bit as well.     See dermatologist for your arm.     Go get Tdap (tetanus and pertussis) at the pharmacy.

## 2023-06-23 NOTE — ASSESSMENT & PLAN NOTE
Following with endocrine. DEXA 2/2023 with decreasing BMD, resumed reclast 4/25/23.   - Continue follow up and mgmt per endocrine

## 2023-06-23 NOTE — PROGRESS NOTES
Assessment & Plan   Problem List Items Addressed This Visit        Nervous and Auditory    Chest wall pain     Patient likely strained muscle on L chest wall with granddaughter last week. Provided reassurance, should continue to improve over next few weeks.   - Can use PRN voltaren, lidocaine or salonpas along with heating pad            Digestive    Irritable bowel syndrome with constipation     Constipation predominant. Well controlled with daily metamucil and miralax as well as avoiding dairy. Rarely uses PRN hyoscyamine.          Celiac disease     Well controlled with gluten free diet. C/b B12 deficiency, is taking replacement.          B12 deficiency     B12 level 535 (12/2022).   - Continue monthly IM 1000 mcg B12 injections         Herpes labialis     PRN valtrex available.            Endocrine    Hyperlipidemia - Primary      on pravastatin 20 mg daily, increased to 40 mg daily 12/2022.   - Repeat lipids, AST, ALT today          Relevant Orders    Lipid Profile (Chol, Trig, HDL, LDL calc)    AST    ALT       Musculoskeletal and Integumentary    Pelvic floor dysfunction in female     GUSM well controlled with estradiol twice weekly.         Osteoporosis     Following with endocrine. DEXA 2/2023 with decreasing BMD, resumed reclast 4/25/23.   - Continue follow up and mgmt per endocrine         Skin lesion     Patient has changing skin lesion on LUE. Started as what was likely solar lentigine but in last few weeks now has overlying scale and edges are irregular and erythematous.   - She will call dermatologist to get appt for further evaluation            She will get Tdap at the pharmacy.      Ordering of each unique test  I spent a total of 38 minutes on the day of the visit.   Time spent by me doing chart review, history and exam, documentation and further activities per the note     BMI:   Estimated body mass index is 25.14 kg/m  as calculated from the following:    Height as of this encounter:  "1.664 m (5' 5.5\").    Weight as of this encounter: 69.6 kg (153 lb 6.4 oz).   Weight management plan: Discussed healthy diet and exercise guidelines    FUTURE APPOINTMENTS:       - Follow-up for annual visit in December or as needed    Melva Melchor MD  [facility] HUGO Espinosa is a 67 year old, presenting for the following health issues:  Follow Up (6 month follow up )        6/23/2023    10:18 AM   Additional Questions   Roomed by Jenifer     History of Present Illness     Reason for visit:  6 mo. appointment from Dec 2022 annual    HLD: Lipids 12/2022 showed Tchol 182, HDL 60, , TG 69. We increased pravastatin to 40 mg daily. Has gained weight since increase but notes that this was also over very cold winter. No muscle aches or pains.     IBS-C: current regimen is daily metamucil and miralax. PRN hyoxcyamine. Last colonoscopy 2/2021, small polyp removed, due for repeat in 5 years. Follows sometimes at [facility]. Better off dairy.     GUSM: Saw urology, no c/f hematuria, continue estrogen. Recommend UA with micro with annual exams, if >2 RBCs/HPF or gross hematuria, refer back to urology.     BIRADS 1 3/16/23    Osteoporosis: BMD worsened on DEXA 2/2023. Saw endo 4/19/23 - plan for reclast again, got infusion 4/25/23, f/u in 1 year.    Celiac disease: C/b B12 deficiency, continues on monthly IM shots.     Rosacea: metrocream available, uses infrequently     Back pain: PRN flexeril     Cold sores: PRN valtrex     Feels like she pulled a muscle with 1 year old granddaughter putting in carseat last week. Getting very slightly better. Not taking any PRN ibuprofen as it upsets her stomach.     She eats 4 or more servings of fruits and vegetables daily.She consumes 0 sweetened beverage(s) daily.She exercises with enough effort to increase her heart rate 30 to 60 minutes per day.  She exercises with enough effort to increase her heart rate 7 days per week.   She is taking " "medications regularly.     Review of Systems   Constitutional, HEENT, cardiovascular, pulmonary, gi and gu systems are negative, except as otherwise noted.      Objective    /62 (BP Location: Right arm, Patient Position: Sitting, Cuff Size: Adult Regular)   Pulse 74   Temp 97.9  F (36.6  C) (Oral)   Resp 18   Ht 1.664 m (5' 5.5\")   Wt 69.6 kg (153 lb 6.4 oz)   SpO2 100%   BMI 25.14 kg/m    Body mass index is 25.14 kg/m .  Physical Exam   GENERAL: healthy, alert and no distress  EYES: Eyes grossly normal to inspection, PERRL and conjunctivae and sclerae normal  HENT: nose and mouth without ulcers or lesions  RESP: lungs clear to auscultation - no rales, rhonchi or wheezes  CV: regular rate and rhythm, normal S1 S2, no S3 or S4, no murmur, click or rub, no peripheral edema and peripheral pulses strong  ABDOMEN: soft, nontender, no hepatosplenomegaly, no masses and bowel sounds normal  MS: no gross musculoskeletal defects noted, no edema. TTP along L lateral chest wall and underneath breast, no overlying rash or skin changes or MSK deformity   SKIN: on L upper arm there is ~dime sized patch with overlying scale and erythematous outer borders. No other suspicious skin findings.   NEURO: Normal strength and tone, mentation intact and speech normal  PSYCH: mentation appears normal, affect normal/bright    No results found for this or any previous visit (from the past 24 hour(s)).              Answers for HPI/ROS submitted by the patient on 6/19/2023  What is the reason for your visit today? : 6 mo. appointment from Dec 2022 annual  How many servings of fruits and vegetables do you eat daily?: 4 or more  On average, how many sweetened beverages do you drink each day (Examples: soda, juice, sweet tea, etc.  Do NOT count diet or artificially sweetened beverages)?: 0  How many minutes a day do you exercise enough to make your heart beat faster?: 30 to 60  How many days a week do you exercise enough to make your " heart beat faster?: 7  How many days per week do you miss taking your medication?: 0

## 2023-06-26 ENCOUNTER — MYC MEDICAL ADVICE (OUTPATIENT)
Dept: INTERNAL MEDICINE | Facility: CLINIC | Age: 68
End: 2023-06-26
Payer: COMMERCIAL

## 2023-08-20 ENCOUNTER — HEALTH MAINTENANCE LETTER (OUTPATIENT)
Age: 68
End: 2023-08-20

## 2023-08-24 ENCOUNTER — E-VISIT (OUTPATIENT)
Dept: INTERNAL MEDICINE | Facility: CLINIC | Age: 68
End: 2023-08-24
Payer: COMMERCIAL

## 2023-08-24 ENCOUNTER — LAB (OUTPATIENT)
Dept: LAB | Facility: CLINIC | Age: 68
End: 2023-08-24
Attending: INTERNAL MEDICINE
Payer: COMMERCIAL

## 2023-08-24 ENCOUNTER — MYC MEDICAL ADVICE (OUTPATIENT)
Dept: INTERNAL MEDICINE | Facility: CLINIC | Age: 68
End: 2023-08-24
Payer: COMMERCIAL

## 2023-08-24 DIAGNOSIS — R05.1 ACUTE COUGH: Primary | ICD-10-CM

## 2023-08-24 DIAGNOSIS — R05.1 ACUTE COUGH: ICD-10-CM

## 2023-08-24 PROCEDURE — 87635 SARS-COV-2 COVID-19 AMP PRB: CPT

## 2023-08-24 PROCEDURE — 99421 OL DIG E/M SVC 5-10 MIN: CPT | Performed by: INTERNAL MEDICINE

## 2023-08-25 LAB — SARS-COV-2 RNA RESP QL NAA+PROBE: NEGATIVE

## 2023-08-29 ENCOUNTER — MYC MEDICAL ADVICE (OUTPATIENT)
Dept: INTERNAL MEDICINE | Facility: CLINIC | Age: 68
End: 2023-08-29
Payer: COMMERCIAL

## 2023-08-30 ENCOUNTER — TELEPHONE (OUTPATIENT)
Dept: INTERNAL MEDICINE | Facility: CLINIC | Age: 68
End: 2023-08-30

## 2023-08-30 ENCOUNTER — OFFICE VISIT (OUTPATIENT)
Dept: FAMILY MEDICINE | Facility: CLINIC | Age: 68
End: 2023-08-30
Payer: COMMERCIAL

## 2023-08-30 VITALS
DIASTOLIC BLOOD PRESSURE: 84 MMHG | TEMPERATURE: 98.4 F | BODY MASS INDEX: 25.97 KG/M2 | OXYGEN SATURATION: 98 % | HEART RATE: 98 BPM | SYSTOLIC BLOOD PRESSURE: 120 MMHG | RESPIRATION RATE: 18 BRPM | WEIGHT: 158.5 LBS

## 2023-08-30 DIAGNOSIS — J06.9 VIRAL URI: Primary | ICD-10-CM

## 2023-08-30 PROCEDURE — 99213 OFFICE O/P EST LOW 20 MIN: CPT | Performed by: FAMILY MEDICINE

## 2023-08-30 RX ORDER — BENZONATATE 200 MG/1
200 CAPSULE ORAL 3 TIMES DAILY PRN
Qty: 30 CAPSULE | Refills: 0 | Status: SHIPPED | OUTPATIENT
Start: 2023-08-30 | End: 2024-01-16

## 2023-08-30 NOTE — TELEPHONE ENCOUNTER
Reason for Call:  Other appointment    Detailed comments: follow up from bad cough    Phone Number Patient can be reached at: Home number on file [phone] (home)    Best Time: anytime    Can we leave a detailed message on this number? YES    Call taken on 8/30/2023 at 8:24 AM by Chaparrita Hilliard

## 2023-08-30 NOTE — PROGRESS NOTES
Assessment:       Viral URI  - benzonatate (TESSALON) 200 MG capsule  Dispense: 30 capsule; Refill: 0         Plan:     Symptoms consistent with a viral upper respiratory infection.  Discussed the typical course of symptoms.  Noantibiotics indicated at this time.  Recommend symptomatic treatment such as decongestants and acetominephen or ibuprofen as needed.  Recommend follow up if getting worse or not improving.      MEDICATIONS:   Orders Placed This Encounter   Medications    benzonatate (TESSALON) 200 MG capsule     Sig: Take 1 capsule (200 mg) by mouth 3 times daily as needed for cough     Dispense:  30 capsule     Refill:  0       Subjective:       68 year old female presents for evaluation of a 10-day history of cough.  She does negative her COVID-19.  Cough started out dry but now has become more red.  She feels that it is not gotten much better.  Denies much nasal congestion or sore throat.  She has not had a fever.  No shortness of breath or wheezing.  She has tried nasal saline rinse but this has not been helpful.  She is wondering what else she can do.  She is not interested in antibiotics at all at this time.    Patient Active Problem List   Diagnosis    Pelvic floor dysfunction in female    Irritable bowel syndrome with constipation    Diverticular disease of large intestine    Celiac disease    Vitamin D deficiency    Polyp of colon    Osteoporosis    Osteopenia    Hyperlipidemia    Personal history of colonic polyps    Gastroesophageal reflux disease    Family history of osteoporosis    Diverticulosis of large intestine without hemorrhage    BPV (benign positional vertigo)    B12 deficiency    Encounter for Medicare annual wellness exam    Post herpetic neuralgia    Herpes labialis    Chest wall pain    Skin lesion       Past Medical History:   Diagnosis Date    B12 deficiency     Breast pain, left 2/21/2023    Celiac disease     Chronic idiopathic constipation 11/08/2021    Diverticulitis      "Diverticulosis     GERD (gastroesophageal reflux disease)     states main problem is heartburn    High cholesterol     Mumps     Osteoporosis     Shingles 01/01/2005    Spider veins     STD (sexually transmitted disease)        Past Surgical History:   Procedure Laterality Date    APPENDECTOMY      age 13    DILATION AND CURETTAGE, DIAGNOSTIC / THERAPEUTIC      age 38, for endometriosis       Current Outpatient Medications   Medication    BD SYRINGE SLIP TIP 25G X 5/8\" 1 ML MISC    benzonatate (TESSALON) 200 MG capsule    cyanocobalamin (CYANOCOBALAMIN) 1000 MCG/ML injection    cyclobenzaprine (FLEXERIL) 5 MG tablet    diltiazem 2% in PLO gel    estradiol (ESTRACE) 0.1 MG/GM vaginal cream    hyoscyamine (LEVSIN) 0.125 MG tablet    Lactobacillus Rhamnosus, GG, ( PROBIOTIC DIGESTIVE CARE) CAPS    latanoprost (XALATAN) 0.005 % ophthalmic solution    METAMUCIL FIBER PO    metroNIDAZOLE (METROCREAM) 0.75 % external cream    polyethylene glycol (MIRALAX) 17 GM/Dose powder    pravastatin (PRAVACHOL) 40 MG tablet    timolol maleate (TIMOPTIC) 0.25 % ophthalmic solution    valACYclovir (VALTREX) 500 MG tablet    VITAMIN D PO     No current facility-administered medications for this visit.       Allergies   Allergen Reactions    Doxycycline GI Disturbance     Abdominal ileus    Alendronate GI Disturbance     Other reaction(s): GI Upset      Clindamycin      Rash, see note from allergy 5/3/21    Gluten Meal GI Disturbance     Celiac disease  Celiac disease      Milk (Cow)      Went Dairy Free in April related to GI issues    Risedronate GI Disturbance    Cephalosporins Rash    Ibuprofen     Nitrofuran Derivatives Other (See Comments) and Rash     Overwhelming nausea    Overwhelming nausea       Family History   Problem Relation Age of Onset    Osteoporosis Mother     Cerebrovascular Disease Mother 74.00    Hyperlipidemia Father     Dementia Father     Breast Cancer Maternal Grandmother        Social History     Socioeconomic " History    Marital status:      Spouse name: None    Number of children: None    Years of education: None    Highest education level: None   Tobacco Use    Smoking status: Never    Smokeless tobacco: Never   Vaping Use    Vaping Use: Never used   Substance and Sexual Activity    Alcohol use: Yes     Alcohol/week: 7.0 standard drinks of alcohol    Drug use: No    Sexual activity: Not Currently     Partners: Male   Social History Narrative     27 years.   Has 2 children from 's first marriage. Has 3 grandchildren.   She has not had any pregnancy.  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    She watches her [employer] 2 days a week. Has 4th grandchild on the way    12/13/21    [facility]           Review of Systems  Pertinent items are noted in HPI.      Objective:                     General Appearance:    /84 (BP Location: Right arm, Patient Position: Sitting, Cuff Size: Adult Regular)   Pulse 98   Temp 98.4  F (36.9  C) (Oral)   Resp 18   Wt 71.9 kg (158 lb 8 oz)   SpO2 98%   BMI 25.97 kg/m          Alert, pleasant, cooperative, no distress, appears stated age   Head:    Normocephalic, without obvious abnormality, atraumatic   Eyes:    Conjunctiva/corneas clear   Ears:    Normal TM's without erythema or bulging. Normal external ear canals, both ears   Nose:   Nares normal, septum midline, mucosa normal, no drainage    or sinus tenderness   Throat:   Lips, mucosa, and tongue normal; teeth and gums normal.  No tonsilar hypertrophy or exudate.   Neck:   Supple, symmetrical, trachea midline, no adenopathy    Lungs:     Clear to auscultation bilaterally without wheezes, rales, or rhonchi, respirations unlabored    Heart:    Regular rate and rhythm, S1 and S2 normal, no murmur, rub or gallop       Extremities:   Extremities normal, atraumatic, no cyanosis or edema   Skin:   Skin color, texture, turgor normal, no rashes or lesions               This note has been  dictated using voice recognition software. Any grammatical or context distortions are unintentional and inherent to the software

## 2023-08-30 NOTE — TELEPHONE ENCOUNTER
As per my instructions in MyChart encounter yesterday, will need to be with a partner or in WIC as I am out the rest of the week and it is a holiday weekend.

## 2023-09-11 DIAGNOSIS — E53.8 B12 DEFICIENCY: ICD-10-CM

## 2023-09-11 RX ORDER — CYANOCOBALAMIN 1000 UG/ML
INJECTION, SOLUTION INTRAMUSCULAR; SUBCUTANEOUS
Qty: 3 ML | Refills: 2 | Status: SHIPPED | OUTPATIENT
Start: 2023-09-11 | End: 2024-06-07

## 2023-09-11 NOTE — TELEPHONE ENCOUNTER
Patient calling to get a medication refill on medications attached    -Patient was getting this from a different provider but wants provider to be the prescriber going forward     3 viles for 3 months    Patient is currently out

## 2023-10-05 ENCOUNTER — MYC MEDICAL ADVICE (OUTPATIENT)
Dept: INTERNAL MEDICINE | Facility: CLINIC | Age: 68
End: 2023-10-05
Payer: COMMERCIAL

## 2023-10-06 ENCOUNTER — TRANSFERRED RECORDS (OUTPATIENT)
Dept: MULTI SPECIALTY CLINIC | Facility: CLINIC | Age: 68
End: 2023-10-06
Payer: COMMERCIAL

## 2023-10-06 LAB — RETINOPATHY: NORMAL

## 2023-10-20 ENCOUNTER — VIRTUAL VISIT (OUTPATIENT)
Dept: FAMILY MEDICINE | Facility: CLINIC | Age: 68
End: 2023-10-20
Payer: COMMERCIAL

## 2023-10-20 ENCOUNTER — MYC MEDICAL ADVICE (OUTPATIENT)
Dept: INTERNAL MEDICINE | Facility: CLINIC | Age: 68
End: 2023-10-20
Payer: COMMERCIAL

## 2023-10-20 ENCOUNTER — NURSE TRIAGE (OUTPATIENT)
Dept: INTERNAL MEDICINE | Facility: CLINIC | Age: 68
End: 2023-10-20
Payer: COMMERCIAL

## 2023-10-20 DIAGNOSIS — U07.1 INFECTION DUE TO 2019 NOVEL CORONAVIRUS: Primary | ICD-10-CM

## 2023-10-20 PROCEDURE — 99213 OFFICE O/P EST LOW 20 MIN: CPT | Mod: VID | Performed by: NURSE PRACTITIONER

## 2023-10-20 NOTE — PROGRESS NOTES
Francisca is a 68 year old who is being evaluated via a billable video visit.      How would you like to obtain your AVS? MyChart  If the video visit is dropped, the invitation should be resent by: Text to cell phone: [phone]  Will anyone else be joining your video visit? No          Assessment & Plan     Infection due to 2019 novel coronavirus  - nirmatrelvir and ritonavir (PAXLOVID) 300 mg/100 mg therapy pack  Dispense: 30 tablet; Refill: 0  Questions answered about paxlovid:  Rebound symptoms  Interactions  Side effects    Discussed hold statin while taking medication    Pt plans to do home support for symptoms and to take paxlovid if symptoms worsnes  Discussed isolation through Tuesday night and longer if fever or cough - may test - questions answered around this.wear mask around others through day 10. Last day to take Paxlovid is Monday.     Discussed take vaccine 3 months after infection for benefit from infection antibodies, may take vaccine at any time though aslong as at least 10 days after infection and no symptoms.     Discussed acetaminophen Prn fever - No more than 4000mg in 24hrs  Increase rest, hydration, vitamin C/Zinc.     Pt verbalized understanding and agrees with POC.                 JEANNE Mckeon CNP  [facility]   Francisca is a 68 year old, presenting for the following health issues:  Follow Up (Covid positive on 10/19/2023. Fever, chill, congestion, running nose, mild sore throat, body ache, and occasional cough)      History of Present Illness       Reason for visit:  Tested positive for Covid  Symptom onset:  1-3 days ago  Symptoms include:  Fever 101.5, using tylenol to control, congestion, runny nose, mild sore throat,bodyaches,chills with fever, occasional cough  Symptom intensity:  Moderate  Symptom progression:  Staying the same  Had these symptoms before:  No  What makes it worse:  Have tummy upset from taking tylenol  What makes it  better:  Taking Tylenol    She eats 4 or more servings of fruits and vegetables daily.She consumes 1 sweetened beverage(s) daily.She exercises with enough effort to increase her heart rate 30 to 60 minutes per day.  She exercises with enough effort to increase her heart rate 7 days per week.   She is taking medications regularly.   First day of symptoms yesterday - tested positive yesterday   - fever or chills  - fatigue  - muscle or body aches  - sore throat  - congestion or runny nose     also got this first (tested positive Wed, sxs on Tuesday)   Using pulse oxometer - 120BMP   Reviewed labs and medications and Problem list -               Review of Systems   Constitutional, HEENT, cardiovascular, pulmonary, gi and gu systems are negative, except as otherwise noted.      Objective    Vitals - Patient Reported  Temperature (Patient Reported): 99.6  F (37.6  C)        Physical Exam   GENERAL: Healthy, alert and no distress  EYES: Eyes grossly normal to inspection.  No discharge or erythema, or obvious scleral/conjunctival abnormalities.  RESP: No audible wheeze, cough, or visible cyanosis.  No visible retractions or increased work of breathing.    SKIN: Visible skin clear. No significant rash, abnormal pigmentation or lesions.  NEURO: Cranial nerves grossly intact.  Mentation and speech appropriate for age.  PSYCH: Mentation appears normal, affect normal/bright, judgement and insight intact, normal speech and appearance well-groomed.                Video-Visit Details    Type of service:  Video Visit   Joined the call at 10/20/2023, 2:40:45 pm.  Left the call at 10/20/2023, 3:02:47 pm.  You were on the call for 22 minutes 2 seconds .    Originating Location (pt. Location): Home  Distant Location (provider location):  On-site  Platform used for Video Visit: Friday ([employer]

## 2023-10-20 NOTE — TELEPHONE ENCOUNTER
RN COVID TREATMENT VISIT  10/20/23      The patient has been triaged and does not require a higher level of care.    Francisca Williamson  68 year old  Current weight? 158 lbs    Has the patient been seen by a primary care provider at an [facility] or [facility] within the past two years? Yes.   Have you been in close proximity to/do you have a known exposure to a person with a confirmed case of influenza? No.     General treatment eligibility:  Date of positive COVID test (PCR or at home)?  10/19/23    Are you or have you been hospitalized for this COVID-19 infection? No.   Have you received monoclonal antibodies or antiviral treatment for COVID-19 since this positive test? No.   Do you have any of the following conditions that place you at risk of being very sick from COVID-19?   - Age 50 years or older  Yes, patient has at least one high risk condition as noted above.     Current COVID symptoms:   - fever or chills  - fatigue  - muscle or body aches  - sore throat  - congestion or runny nose  Yes. Patient has at least one symptom as selected.     How many days since symptoms started? 5 days or less. Established patient, 12 years or older weighing at least 88.2 lbs, who has symptoms that started in the past 5 days, has not been hospitalized nor received treatment already, and is at risk for being very sick from COVID-19.     Pt prefers to speak with a provider regarding COVID treatment options. Transferred to scheduling to set up virtual visit.     Nereyda Cortes RN BSN  [facility]            Reason for Disposition   [1] Fever > 101 F (38.3 C) AND [2] age > 60 years    Additional Information   Negative: SEVERE difficulty breathing (e.g., struggling for each breath, speaks in single words)   Negative: Difficult to awaken or acting confused (e.g., disoriented, slurred speech)   Negative: Bluish (or gray) lips or face now   Negative: Shock suspected (e.g., cold/pale/clammy skin,  too weak to stand, low BP, rapid pulse)   Negative: Sounds like a life-threatening emergency to the triager   Negative: SEVERE or constant chest pain or pressure  (Exception: Mild central chest pain, present only when coughing.)   Negative: MODERATE difficulty breathing (e.g., speaks in phrases, SOB even at rest, pulse 100-120)   Negative: [1] Headache AND [2] stiff neck (can't touch chin to chest)   Negative: Oxygen level (e.g., pulse oximetry) 90 percent or lower   Negative: Chest pain or pressure  (Exception: MILD central chest pain, present only when coughing.)   Negative: [1] Drinking very little AND [2] dehydration suspected (e.g., no urine > 12 hours, very dry mouth, very lightheaded)   Negative: Patient sounds very sick or weak to the triager    Protocols used: Coronavirus (COVID-19) Diagnosed or [id number]

## 2023-12-02 ENCOUNTER — IMMUNIZATION (OUTPATIENT)
Dept: FAMILY MEDICINE | Facility: CLINIC | Age: 68
End: 2023-12-02
Payer: COMMERCIAL

## 2023-12-02 PROCEDURE — G0008 ADMIN INFLUENZA VIRUS VAC: HCPCS

## 2023-12-02 PROCEDURE — 90686 IIV4 VACC NO PRSV 0.5 ML IM: CPT

## 2023-12-04 DIAGNOSIS — E53.8 B12 DEFICIENCY: ICD-10-CM

## 2023-12-05 RX ORDER — SYRINGE, DISPOSABLE, 1 ML
SYRINGE, EMPTY DISPOSABLE MISCELLANEOUS
Qty: 100 EACH | Refills: 0 | Status: SHIPPED | OUTPATIENT
Start: 2023-12-05

## 2024-01-04 DIAGNOSIS — E78.5 HYPERLIPIDEMIA, UNSPECIFIED HYPERLIPIDEMIA TYPE: ICD-10-CM

## 2024-01-04 RX ORDER — PRAVASTATIN SODIUM 40 MG
40 TABLET ORAL DAILY
Qty: 90 TABLET | Refills: 1 | Status: SHIPPED | OUTPATIENT
Start: 2024-01-04 | End: 2024-01-16

## 2024-01-07 ENCOUNTER — HEALTH MAINTENANCE LETTER (OUTPATIENT)
Age: 69
End: 2024-01-07

## 2024-01-10 ASSESSMENT — ENCOUNTER SYMPTOMS
JOINT SWELLING: 0
HEADACHES: 0
HEMATOCHEZIA: 0
DIZZINESS: 0
PALPITATIONS: 0
DYSURIA: 0
WEAKNESS: 0
EYE PAIN: 0
ARTHRALGIAS: 0
FEVER: 0
NAUSEA: 0
FREQUENCY: 0
HEMATURIA: 0
CONSTIPATION: 0
DIARRHEA: 0
ABDOMINAL PAIN: 0
NERVOUS/ANXIOUS: 0
BREAST MASS: 0
MYALGIAS: 0
SORE THROAT: 0
PARESTHESIAS: 0
SHORTNESS OF BREATH: 0
CHILLS: 0
HEARTBURN: 0
COUGH: 0

## 2024-01-10 ASSESSMENT — ACTIVITIES OF DAILY LIVING (ADL): CURRENT_FUNCTION: NO ASSISTANCE NEEDED

## 2024-01-16 ENCOUNTER — OFFICE VISIT (OUTPATIENT)
Dept: INTERNAL MEDICINE | Facility: CLINIC | Age: 69
End: 2024-01-16
Payer: COMMERCIAL

## 2024-01-16 VITALS
TEMPERATURE: 97.5 F | DIASTOLIC BLOOD PRESSURE: 74 MMHG | WEIGHT: 162 LBS | SYSTOLIC BLOOD PRESSURE: 118 MMHG | OXYGEN SATURATION: 97 % | BODY MASS INDEX: 26.99 KG/M2 | HEIGHT: 65 IN | HEART RATE: 57 BPM | RESPIRATION RATE: 18 BRPM

## 2024-01-16 DIAGNOSIS — Z12.31 ENCOUNTER FOR SCREENING MAMMOGRAM FOR BREAST CANCER: ICD-10-CM

## 2024-01-16 DIAGNOSIS — Z00.00 ENCOUNTER FOR MEDICARE ANNUAL WELLNESS EXAM: Primary | ICD-10-CM

## 2024-01-16 DIAGNOSIS — Z23 ENCOUNTER FOR VACCINATION: ICD-10-CM

## 2024-01-16 DIAGNOSIS — L71.9 ROSACEA: ICD-10-CM

## 2024-01-16 DIAGNOSIS — E78.5 HYPERLIPIDEMIA, UNSPECIFIED HYPERLIPIDEMIA TYPE: ICD-10-CM

## 2024-01-16 DIAGNOSIS — M94.0 COSTOCHONDRITIS: ICD-10-CM

## 2024-01-16 DIAGNOSIS — K58.1 IRRITABLE BOWEL SYNDROME WITH CONSTIPATION: ICD-10-CM

## 2024-01-16 DIAGNOSIS — N95.2 ATROPHIC VAGINITIS: ICD-10-CM

## 2024-01-16 DIAGNOSIS — M81.0 AGE-RELATED OSTEOPOROSIS WITHOUT CURRENT PATHOLOGICAL FRACTURE: ICD-10-CM

## 2024-01-16 DIAGNOSIS — M62.89 PELVIC FLOOR DYSFUNCTION IN FEMALE: ICD-10-CM

## 2024-01-16 DIAGNOSIS — E53.8 B12 DEFICIENCY: ICD-10-CM

## 2024-01-16 DIAGNOSIS — K90.0 CELIAC DISEASE: ICD-10-CM

## 2024-01-16 DIAGNOSIS — E78.2 MIXED HYPERLIPIDEMIA: ICD-10-CM

## 2024-01-16 PROBLEM — M85.80 OSTEOPENIA: Status: RESOLVED | Noted: 2020-05-11 | Resolved: 2024-01-16

## 2024-01-16 LAB
ALBUMIN SERPL BCG-MCNC: 4.6 G/DL (ref 3.5–5.2)
ALBUMIN UR-MCNC: NEGATIVE MG/DL
ALP SERPL-CCNC: 47 U/L (ref 40–150)
ALT SERPL W P-5'-P-CCNC: 11 U/L (ref 0–50)
ANION GAP SERPL CALCULATED.3IONS-SCNC: 7 MMOL/L (ref 7–15)
APPEARANCE UR: CLEAR
AST SERPL W P-5'-P-CCNC: 24 U/L (ref 0–45)
BACTERIA #/AREA URNS HPF: ABNORMAL /HPF
BILIRUB SERPL-MCNC: 0.3 MG/DL
BILIRUB UR QL STRIP: NEGATIVE
BUN SERPL-MCNC: 16.6 MG/DL (ref 8–23)
CALCIUM SERPL-MCNC: 10 MG/DL (ref 8.8–10.2)
CHLORIDE SERPL-SCNC: 104 MMOL/L (ref 98–107)
CHOLEST SERPL-MCNC: 165 MG/DL
COLOR UR AUTO: YELLOW
CREAT SERPL-MCNC: 0.95 MG/DL (ref 0.51–0.95)
CREAT UR-MCNC: 148 MG/DL
DEPRECATED HCO3 PLAS-SCNC: 28 MMOL/L (ref 22–29)
EGFRCR SERPLBLD CKD-EPI 2021: 64 ML/MIN/1.73M2
ERYTHROCYTE [DISTWIDTH] IN BLOOD BY AUTOMATED COUNT: 13.6 % (ref 10–15)
FASTING STATUS PATIENT QL REPORTED: ABNORMAL
GLUCOSE SERPL-MCNC: 106 MG/DL (ref 70–99)
GLUCOSE UR STRIP-MCNC: NEGATIVE MG/DL
HBA1C MFR BLD: 5.5 % (ref 0–5.6)
HCT VFR BLD AUTO: 41 % (ref 35–47)
HDLC SERPL-MCNC: 46 MG/DL
HGB BLD-MCNC: 13.6 G/DL (ref 11.7–15.7)
HGB UR QL STRIP: ABNORMAL
KETONES UR STRIP-MCNC: NEGATIVE MG/DL
LDLC SERPL CALC-MCNC: 100 MG/DL
LEUKOCYTE ESTERASE UR QL STRIP: NEGATIVE
MCH RBC QN AUTO: 29.8 PG (ref 26.5–33)
MCHC RBC AUTO-ENTMCNC: 33.2 G/DL (ref 31.5–36.5)
MCV RBC AUTO: 90 FL (ref 78–100)
MICROALBUMIN UR-MCNC: <12 MG/L
MICROALBUMIN/CREAT UR: NORMAL MG/G{CREAT}
NITRATE UR QL: NEGATIVE
NONHDLC SERPL-MCNC: 119 MG/DL
PH UR STRIP: 6 [PH] (ref 5–8)
PLATELET # BLD AUTO: 242 10E3/UL (ref 150–450)
POTASSIUM SERPL-SCNC: 4.3 MMOL/L (ref 3.4–5.3)
PROT SERPL-MCNC: 7.6 G/DL (ref 6.4–8.3)
RBC # BLD AUTO: 4.57 10E6/UL (ref 3.8–5.2)
RBC #/AREA URNS AUTO: ABNORMAL /HPF
SODIUM SERPL-SCNC: 139 MMOL/L (ref 135–145)
SP GR UR STRIP: 1.02 (ref 1–1.03)
SQUAMOUS #/AREA URNS AUTO: ABNORMAL /LPF
TRIGL SERPL-MCNC: 95 MG/DL
TSH SERPL DL<=0.005 MIU/L-ACNC: 3.11 UIU/ML (ref 0.3–4.2)
UROBILINOGEN UR STRIP-ACNC: 0.2 E.U./DL
VIT B12 SERPL-MCNC: 596 PG/ML (ref 232–1245)
WBC # BLD AUTO: 5.5 10E3/UL (ref 4–11)
WBC #/AREA URNS AUTO: ABNORMAL /HPF

## 2024-01-16 PROCEDURE — 36415 COLL VENOUS BLD VENIPUNCTURE: CPT | Performed by: INTERNAL MEDICINE

## 2024-01-16 PROCEDURE — 99214 OFFICE O/P EST MOD 30 MIN: CPT | Mod: 25 | Performed by: INTERNAL MEDICINE

## 2024-01-16 PROCEDURE — 81001 URINALYSIS AUTO W/SCOPE: CPT | Performed by: INTERNAL MEDICINE

## 2024-01-16 PROCEDURE — G0439 PPPS, SUBSEQ VISIT: HCPCS | Performed by: INTERNAL MEDICINE

## 2024-01-16 PROCEDURE — 84443 ASSAY THYROID STIM HORMONE: CPT | Performed by: INTERNAL MEDICINE

## 2024-01-16 PROCEDURE — 90480 ADMN SARSCOV2 VAC 1/ONLY CMP: CPT | Performed by: INTERNAL MEDICINE

## 2024-01-16 PROCEDURE — 83036 HEMOGLOBIN GLYCOSYLATED A1C: CPT | Mod: GZ | Performed by: INTERNAL MEDICINE

## 2024-01-16 PROCEDURE — 82043 UR ALBUMIN QUANTITATIVE: CPT | Performed by: INTERNAL MEDICINE

## 2024-01-16 PROCEDURE — 82607 VITAMIN B-12: CPT | Performed by: INTERNAL MEDICINE

## 2024-01-16 PROCEDURE — 80053 COMPREHEN METABOLIC PANEL: CPT | Performed by: INTERNAL MEDICINE

## 2024-01-16 PROCEDURE — 80061 LIPID PANEL: CPT | Performed by: INTERNAL MEDICINE

## 2024-01-16 PROCEDURE — 85027 COMPLETE CBC AUTOMATED: CPT | Performed by: INTERNAL MEDICINE

## 2024-01-16 PROCEDURE — 91320 SARSCV2 VAC 30MCG TRS-SUC IM: CPT | Performed by: INTERNAL MEDICINE

## 2024-01-16 PROCEDURE — 82570 ASSAY OF URINE CREATININE: CPT | Performed by: INTERNAL MEDICINE

## 2024-01-16 RX ORDER — TIMOLOL MALEATE 5 MG/ML
1 SOLUTION/ DROPS OPHTHALMIC EVERY MORNING
COMMUNITY
Start: 2023-11-30

## 2024-01-16 RX ORDER — ESTRADIOL 0.1 MG/G
CREAM VAGINAL
Qty: 42.5 G | Refills: 4 | Status: SHIPPED | OUTPATIENT
Start: 2024-01-17

## 2024-01-16 RX ORDER — RESPIRATORY SYNCYTIAL VIRUS VACCINE 120MCG/0.5
0.5 KIT INTRAMUSCULAR ONCE
Qty: 1 EACH | Refills: 0 | Status: CANCELLED | OUTPATIENT
Start: 2024-01-16 | End: 2024-01-16

## 2024-01-16 RX ORDER — PRAVASTATIN SODIUM 40 MG
40 TABLET ORAL DAILY
Qty: 90 TABLET | Refills: 3 | Status: SHIPPED | OUTPATIENT
Start: 2024-01-16

## 2024-01-16 ASSESSMENT — ENCOUNTER SYMPTOMS
SHORTNESS OF BREATH: 0
PARESTHESIAS: 0
NAUSEA: 0
ARTHRALGIAS: 0
EYE PAIN: 0
FREQUENCY: 0
WEAKNESS: 0
HEMATOCHEZIA: 0
FEVER: 0
BREAST MASS: 0
HEMATURIA: 0
SORE THROAT: 0
ABDOMINAL PAIN: 0
DIZZINESS: 0
COUGH: 0
PALPITATIONS: 0
CHILLS: 0
MYALGIAS: 0
CONSTIPATION: 0
HEARTBURN: 0
JOINT SWELLING: 0
DYSURIA: 0
NERVOUS/ANXIOUS: 0
DIARRHEA: 0
HEADACHES: 0

## 2024-01-16 ASSESSMENT — ACTIVITIES OF DAILY LIVING (ADL): CURRENT_FUNCTION: NO ASSISTANCE NEEDED

## 2024-01-16 NOTE — ASSESSMENT & PLAN NOTE
Left chest wall is tender to palpation today and she does feel pain in the pectoral muscles with range of motion of her shoulder.  Consistent with costochondritis.  -Reassurance provided, and discussed that this often lingers for many months  -Can trial lidocaine cream, Voltaren gel, heat

## 2024-01-16 NOTE — PROGRESS NOTES
"SUBJECTIVE:   Francisca is a 68 year old, presenting for the following:  Physical (AWV)        1/16/2024     9:15 AM   Additional Questions   Roomed by Destin Na     Are you in the first 12 months of your Medicare coverage?  No    Healthy Habits:     In general, how would you rate your overall health?  Good    Frequency of exercise:  4-5 days/week    Duration of exercise:  30-45 minutes    Do you usually eat at least 4 servings of fruit and vegetables a day, include whole grains    & fiber and avoid regularly eating high fat or \"junk\" foods?  Yes    Taking medications regularly:  Yes    Medication side effects:  Other    Ability to successfully perform activities of daily living:  No assistance needed    Home Safety:  No safety concerns identified    Hearing Impairment:  Difficulty following a conversation in a noisy restaurant or crowded room and difficulty understanding soft or whispered speech    In the past 6 months, have you been bothered by leaking of urine?  No    In general, how would you rate your overall mental or emotional health?  Excellent    Additional concerns today:  Yes    Today's PHQ-2 Score:       1/16/2024     9:12 AM   PHQ-2 ( 1999 [employer])   Q1: Little interest or pleasure in doing things 0   Q2: Feeling down, depressed or hopeless 0   PHQ-2 Score 0   Q1: Little interest or pleasure in doing things Not at all   Q2: Feeling down, depressed or hopeless Not at all   PHQ-2 Score 0     Francisca is here for AWV today. Overall, doing very well. Still having chest wall pain as below.  has a lung cancer that they are following with oncology for. We reviewed current medical issues as below.     HLD: Pravastatin 40 mg daily. Repeat lipids after we increased 6/2023 showed Tchol 168, HDL 53, , .     IBS-C: current regimen is daily metamucil and miralax. PRN hyoxcyamine available (has only used maybe once). Last colonoscopy 2/2021, small polyp removed, due for repeat in 5 years. Follows sometimes " at [facility]. Better off dairy.   - Doing generally okay      GUSM / atrophic vaginitis: Saw urology, no c/f hematuria, continue estrogen. Recommend UA with micro with annual exams, if >2 RBCs/HPF or gross hematuria, refer back to urology.      Osteoporosis: BMD worsened on DEXA 2/2023. Saw endo 4/19/23 - plan for reclast again, got infusion 4/25/23, f/u in 1 year.     Celiac disease: C/b B12 deficiency, continues on monthly IM shots.      Rosacea: metrocream available, uses infrequently    L chest wall / breast / shoulder pain: still present over last year, shoots down to nipple. Once a week, lasts for 10-15 minutes at most and then self resolves. We did diagnostic mammo and L breast US for this 3/16/23 and these were unremarkable 3/16/23. Chest wall discomfort present more of the time than these intermittent shooting pain. Voltaren not so helpful. Heat is.      Back pain: PRN flexeril     Cold sores: PRN valtrex     HCM: Colonoscopy 2/2021, repeat due 5 years.     Wt Readings from Last 4 Encounters:   01/16/24 73.5 kg (162 lb)   08/30/23 71.9 kg (158 lb 8 oz)   06/23/23 69.6 kg (153 lb 6.4 oz)   04/25/23 67.6 kg (149 lb)       Have you ever done Advance Care Planning? (For example, a Health Directive, POLST, or a discussion with a medical provider or your loved ones about your wishes): Yes, advance care planning is on file.       Fall risk  Fallen 2 or more times in the past year?: No  Any fall with injury in the past year?: No    Cognitive Screening   1) Repeat 3 items (Leader, Season, Table)    2) Clock draw: NORMAL  3) 3 item recall: Recalls 3 objects  Results: 3 items recalled: COGNITIVE IMPAIRMENT LESS LIKELY    Mini-CogTM Copyright S Richar. Licensed by the author for use in [employer]; reprinted with permission (cornelius@.[facility]). All rights reserved.      Do you have sleep apnea, excessive snoring or daytime drowsiness? : no    Reviewed and updated as needed this visit by clinical staff   Tobacco   Allergies  Meds  Problems  Med Hx  Surg Hx  Fam Hx          Reviewed and updated as needed this visit by Provider   Tobacco  Allergies  Meds  Problems  Med Hx  Surg Hx  Fam Hx         Social History     Tobacco Use    Smoking status: Never     Passive exposure: Never    Smokeless tobacco: Never   Substance Use Topics    Alcohol use: Yes     Alcohol/week: 7.0 standard drinks of alcohol         1/10/2024    10:35 AM   Alcohol Use   Prescreen: >3 drinks/day or >7 drinks/week? No     Do you have a current opioid prescription? No  Do you use any other controlled substances or medications that are not prescribed by a provider? None      Current providers sharing in care for this patient include:   Patient Care Team:  Melva Melchor MD as PCP - General (Internal Medicine)  Rajwinder Rucker MD as MD (Endocrinology, Diabetes, and Metabolism)  Rajwinder Rucker MD as Assigned Endocrinology Provider  Shanell Booker PA-C as Physician Assistant (Urology)  Shanell Booker PA-C as Assigned OBGYN Provider  Melva Melchor MD as Assigned PCP    The following health maintenance items are reviewed in Epic and correct as of today:  Health Maintenance   Topic Date Due    ZOSTER IMMUNIZATION (1 of 2) Never done    RSV VACCINE (Pregnancy & 60+) (1 - 1-dose 60+ series) Never done    ANNUAL REVIEW OF HM ORDERS  12/13/2022    A1C  01/21/2023    MICROALBUMIN  03/09/2023    BMP  04/13/2024    LIPID  06/23/2024    EYE EXAM  10/06/2024    MEDICARE ANNUAL WELLNESS VISIT  01/16/2025    FALL RISK ASSESSMENT  01/16/2025    MAMMO SCREENING  03/16/2025    COLORECTAL CANCER SCREENING  02/17/2026    ADVANCE CARE PLANNING  01/16/2029    DTAP/TDAP/TD IMMUNIZATION (6 - Td or Tdap) 06/23/2033    DEXA  02/27/2038    PHQ-2 (once per calendar year)  Completed    INFLUENZA VACCINE  Completed    Pneumococcal Vaccine: 65+ Years  Completed    COVID-19 Vaccine  Completed    IPV IMMUNIZATION  Aged Out    HPV IMMUNIZATION  Aged  "Out    MENINGITIS IMMUNIZATION  Aged Out    RSV MONOCLONAL ANTIBODY  Aged Out    DIABETIC FOOT EXAM  Discontinued    HEPATITIS C SCREENING  Discontinued     Review of Systems   Constitutional:  Negative for chills and fever.   HENT:  Negative for congestion, ear pain, hearing loss and sore throat.    Eyes:  Negative for pain and visual disturbance.   Respiratory:  Negative for cough and shortness of breath.    Cardiovascular:  Negative for chest pain, palpitations and peripheral edema.   Gastrointestinal:  Negative for abdominal pain, constipation, diarrhea, heartburn, hematochezia and nausea.   Breasts:  Positive for tenderness. Negative for breast mass and discharge.   Genitourinary:  Negative for dysuria, frequency, genital sores, hematuria, pelvic pain, urgency, vaginal bleeding and vaginal discharge.   Musculoskeletal:  Negative for arthralgias, joint swelling and myalgias.   Skin:  Negative for rash.   Neurological:  Negative for dizziness, weakness, headaches and paresthesias.   Psychiatric/Behavioral:  Negative for mood changes. The patient is not nervous/anxious.        OBJECTIVE:   /74 (BP Location: Right arm, Patient Position: Sitting, Cuff Size: Adult Regular)   Pulse 57   Temp 97.5  F (36.4  C) (Oral)   Resp 18   Ht 1.64 m (5' 4.57\")   Wt 73.5 kg (162 lb)   SpO2 97%   BMI 27.32 kg/m   Estimated body mass index is 27.32 kg/m  as calculated from the following:    Height as of this encounter: 1.64 m (5' 4.57\").    Weight as of this encounter: 73.5 kg (162 lb).  Physical Exam  GENERAL: healthy, alert and no distress  EYES: Eyes grossly normal to inspection, PERRL and conjunctivae and sclerae normal  HENT: ear canals and TM's normal, nose and mouth without ulcers or lesions  NECK: no adenopathy, no asymmetry, masses, or scars and thyroid normal to palpation  RESP: lungs clear to auscultation - no rales, rhonchi or wheezes  CV: regular rate and rhythm, normal S1 S2, no S3 or S4, no murmur, click " or rub, no peripheral edema and peripheral pulses strong  CHEST: +L chest wall musculature tender to palpation, no LAD or masses felt on exam of L breast and axilla   ABDOMEN: soft, nontender, no hepatosplenomegaly, no masses and bowel sounds normal  MS: no gross musculoskeletal defects noted, no edema.   SKIN: no suspicious lesions or rashes  NEURO: Normal strength and tone, mentation intact and speech normal  PSYCH: mentation appears normal, affect normal/bright    Diagnostic Test Results:  Labs reviewed in Epic  No results found for this or any previous visit (from the past 24 hour(s)).    ASSESSMENT / PLAN:     Problem List Items Addressed This Visit          Digestive    Irritable bowel syndrome with constipation     Constipation predominant. Well controlled with daily metamucil and miralax as well as avoiding dairy. Rarely uses PRN hyoscyamine.          Celiac disease     Well controlled with gluten free diet. C/b B12 deficiency, is taking replacement.          B12 deficiency     B12 level 535 (12/2022).   - Continue monthly IM 1000 mcg B12 injections  - Check B12 level today         Relevant Orders    Vitamin B12       Endocrine    Hyperlipidemia     Tchol 168, HDL 53, , .   - Continue pravastatin 40 mg daily  - Repeat lipids today          Relevant Medications    pravastatin (PRAVACHOL) 40 MG tablet    Other Relevant Orders    Lipid panel reflex to direct LDL Fasting       Musculoskeletal and Integumentary    Pelvic floor dysfunction in female     GUSM well controlled with estradiol twice weekly.  - Will check UA annually with labs, ordered today   - Continue estrace cream, refill provided         Relevant Medications    estradiol (ESTRACE) 0.1 MG/GM vaginal cream (Start on 1/17/2024)    Osteoporosis     Following with endocrine. DEXA 2/2023 with decreasing BMD, resumed reclast 4/25/23.   - Continue follow up and mgmt per endocrine         Rosacea     Well-controlled.  Has MetroCream available  to use as needed, uses very rarely.         Relevant Medications    timolol maleate (TIMOPTIC) 0.5 % ophthalmic solution    Costochondritis     Left chest wall is tender to palpation today and she does feel pain in the pectoral muscles with range of motion of her shoulder.  Consistent with costochondritis.  -Reassurance provided, and discussed that this often lingers for many months  -Can trial lidocaine cream, Voltaren gel, heat            Urinary    Atrophic vaginitis     Well controlled with estrace cream. Will continue this and check UA today.          Relevant Orders    Albumin Random Urine Quantitative with Creat Ratio    UA Macroscopic with reflex to Microscopic and Culture - Lab Collect       Other    Encounter for Medicare annual wellness exam - Primary     We discussed healthy lifestyle, nutrition, cardiovascular risk reduction, self care, safety, sunscreen, and timing of cancer screening.  Health maintenance screening and immunizations reviewed with the patient.    - Will update labs today  - Mammo due in March, ordered  - Colonoscopy due 2026  - COVID booster today, will get shingles and RSV At pharmacy   - Continue active lifestyle  - Follow up yearly for the annual physical.           Relevant Orders    HEMOGLOBIN A1C    Comprehensive metabolic panel    CBC with platelets    TSH with free T4 reflex     Other Visit Diagnoses       Encounter for vaccination        Relevant Orders    COVID-19 12+ (2023-24) (PFIZER) (Completed)    Encounter for screening mammogram for breast cancer        Relevant Orders    MA Screen Bilateral w/Ross            Patient has been advised of split billing requirements and indicates understanding: Yes      COUNSELING:  Reviewed preventive health counseling, as reflected in patient instructions  Special attention given to:       Regular exercise       Healthy diet/nutrition       Immunizations         Osteoporosis prevention/bone health      BMI:   Estimated body mass index is  "27.32 kg/m  as calculated from the following:    Height as of this encounter: 1.64 m (5' 4.57\").    Weight as of this encounter: 73.5 kg (162 lb).   Weight management plan: Discussed healthy diet and exercise guidelines      She reports that she has never smoked. She has never been exposed to tobacco smoke. She has never used smokeless tobacco.        Appropriate preventive services were discussed with this patient, including applicable screening as appropriate for fall prevention, nutrition, physical activity, Tobacco-use cessation, weight loss and cognition.  Checklist reviewing preventive services available has been given to the patient.    Reviewed patients plan of care and provided an AVS. The Basic Care Plan (routine screening as documented in Health Maintenance) for Francisca meets the Care Plan requirement. This Care Plan has been established and reviewed with the Patient.          Melva Melchor MD  [facility]    Identified Health Risks:  I have reviewed Opioid Use Disorder and Substance Use Disorder risk factors and made any needed referrals. The patient was provided with written information regarding signs of hearing loss.  "

## 2024-01-16 NOTE — ASSESSMENT & PLAN NOTE
GUSM well controlled with estradiol twice weekly.  - Will check UA annually with labs, ordered today   - Continue estrace cream, refill provided

## 2024-01-16 NOTE — ASSESSMENT & PLAN NOTE
We discussed healthy lifestyle, nutrition, cardiovascular risk reduction, self care, safety, sunscreen, and timing of cancer screening.  Health maintenance screening and immunizations reviewed with the patient.    - Will update labs today  - Mammo due in March, ordered  - Colonoscopy due 2026  - COVID booster today, will get shingles and RSV At pharmacy   - Continue active lifestyle  - Follow up yearly for the annual physical.

## 2024-01-16 NOTE — PATIENT INSTRUCTIONS
Shingles and RSV vaccines at the pharmacy.     Mammogram due 3/16/24 or after.     Patient Education   Personalized Prevention Plan  You are due for the preventive services outlined below.  Your care team is available to assist you in scheduling these services.  If you have already completed any of these items, please share that information with your care team to update in your medical record.  Health Maintenance Due   Topic Date Due    Zoster (Shingles) Vaccine (1 of 2) Never done    RSV VACCINE (Pregnancy & 60+) (1 - 1-dose 60+ series) Never done    ANNUAL REVIEW OF HM ORDERS  12/13/2022    A1C Lab  01/21/2023    Kidney Microalbumin Urine Test  03/09/2023    COVID-19 Vaccine (7 - 2023-24 season) 09/01/2023    Annual Wellness Visit  12/21/2023     Hearing Loss: Care Instructions  Overview     Hearing loss is a sudden or slow decrease in how well you hear. It can range from slight to profound. Permanent hearing loss can occur with aging. It also can happen when you are exposed long-term to loud noise. Examples include listening to loud music, riding motorcycles, or being around other loud machines.  Hearing loss can affect your work and home life. It can make you feel lonely or depressed. You may feel that you have lost your independence. But hearing aids and other devices can help you hear better and feel connected to others.  Follow-up care is a key part of your treatment and safety. Be sure to make and go to all appointments, and call your doctor if you are having problems. It's also a good idea to know your test results and keep a list of the medicines you take.  How can you care for yourself at home?  Avoid loud noises whenever possible. This helps keep your hearing from getting worse.  Always wear hearing protection around loud noises.  Wear a hearing aid as directed.  A professional can help you pick a hearing aid that will work best for you.  You can also get hearing aids over the counter for mild to  "moderate hearing loss.  Have hearing tests as your doctor suggests. They can show whether your hearing has changed. Your hearing aid may need to be adjusted.  Use other devices as needed. These may include:  Telephone amplifiers and hearing aids that can connect to a television, stereo, radio, or microphone.  Devices that use lights or vibrations. These alert you to the doorbell, a ringing telephone, or a baby monitor.  Television closed-captioning. This shows the words at the bottom of the screen. Most new TVs can do this.  TTY (text telephone). This lets you type messages back and forth on the telephone instead of talking or listening. These devices are also called TDD. When messages are typed on the keyboard, they are sent over the phone line to a receiving TTY. The message is shown on a monitor.  Use text messaging, social media, and email if it is hard for you to communicate by telephone.  Try to learn a listening technique called speechreading. It is not lipreading. You pay attention to people's gestures, expressions, posture, and tone of voice. These clues can help you understand what a person is saying. Face the person you are talking to, and have them face you. Make sure the lighting is good. You need to see the other person's face clearly.  Think about counseling if you need help to adjust to your hearing loss.  When should you call for help?  Watch closely for changes in your health, and be sure to contact your doctor if:    You think your hearing is getting worse.     You have new symptoms, such as dizziness or nausea.   Where can you learn more?  Go to https://www.[employer].net/patiented  Enter R798 in the search box to learn more about \"Hearing Loss: Care Instructions.\"  Current as of: February 28, 2023               Content Version: 13.8    2241-8087 [employer].   Care instructions adapted under license by your healthcare professional. If you have questions about a medical condition or " this instruction, always ask your healthcare professional. [employer] disclaims any warranty or liability for your use of this information.

## 2024-02-02 ENCOUNTER — PATIENT OUTREACH (OUTPATIENT)
Dept: GASTROENTEROLOGY | Facility: CLINIC | Age: 69
End: 2024-02-02
Payer: COMMERCIAL

## 2024-03-06 ENCOUNTER — OFFICE VISIT (OUTPATIENT)
Dept: FAMILY MEDICINE | Facility: CLINIC | Age: 69
End: 2024-03-06
Payer: COMMERCIAL

## 2024-03-06 VITALS
SYSTOLIC BLOOD PRESSURE: 122 MMHG | OXYGEN SATURATION: 97 % | DIASTOLIC BLOOD PRESSURE: 78 MMHG | HEART RATE: 58 BPM | RESPIRATION RATE: 20 BRPM | TEMPERATURE: 98.8 F

## 2024-03-06 DIAGNOSIS — J02.9 SORE THROAT: ICD-10-CM

## 2024-03-06 DIAGNOSIS — H10.32 ACUTE BACTERIAL CONJUNCTIVITIS OF LEFT EYE: Primary | ICD-10-CM

## 2024-03-06 LAB
DEPRECATED S PYO AG THROAT QL EIA: NEGATIVE
GROUP A STREP BY PCR: NOT DETECTED

## 2024-03-06 PROCEDURE — 99214 OFFICE O/P EST MOD 30 MIN: CPT | Performed by: STUDENT IN AN ORGANIZED HEALTH CARE EDUCATION/TRAINING PROGRAM

## 2024-03-06 PROCEDURE — 87651 STREP A DNA AMP PROBE: CPT | Performed by: STUDENT IN AN ORGANIZED HEALTH CARE EDUCATION/TRAINING PROGRAM

## 2024-03-06 RX ORDER — ERYTHROMYCIN 5 MG/G
OINTMENT OPHTHALMIC
Qty: 3.5 G | Refills: 0 | Status: SHIPPED | OUTPATIENT
Start: 2024-03-06 | End: 2024-03-06 | Stop reason: ALTCHOICE

## 2024-03-06 RX ORDER — POLYMYXIN B SULFATE AND TRIMETHOPRIM 1; 10000 MG/ML; [USP'U]/ML
SOLUTION OPHTHALMIC
Qty: 10 ML | Refills: 0 | Status: SHIPPED | OUTPATIENT
Start: 2024-03-06 | End: 2024-03-07

## 2024-03-06 RX ORDER — GABAPENTIN 100 MG/1
CAPSULE ORAL
COMMUNITY
Start: 2024-02-26 | End: 2024-05-01

## 2024-03-06 NOTE — PATIENT INSTRUCTIONS
Pinkeye: Care Instructions  Overview     Pinkeye is redness and swelling of the eye surface and the conjunctiva (the lining of the eyelid and the covering of the white part of the eye). Pinkeye is also called conjunctivitis. Pinkeye is often caused by infection with bacteria or a virus. Dry air, allergies, smoke, and chemicals are other common causes.  Pinkeye often gets better on its own in 7 to 10 days. Antibiotics only help if the pinkeye is caused by bacteria. Pinkeye caused by infection spreads easily. If an allergy or chemical is causing pinkeye, it will not go away unless you can avoid whatever is causing it.  Follow-up care is a key part of your treatment and safety. Be sure to make and go to all appointments, and call your doctor if you are having problems. It's also a good idea to know your test results and keep a list of the medicines you take.  How can you care for yourself at home?  Wash your hands often. Always wash them before and after you treat pinkeye or touch your eyes or face.  Use moist cotton or a clean, wet cloth to remove crust. Wipe from the inside corner of the eye to the outside. Use a clean part of the cloth for each wipe.  Put cold or warm wet cloths on your eye a few times a day if the eye hurts.  Do not wear contact lenses or eye makeup until the pinkeye is gone. Throw away any eye makeup you were using when you got pinkeye. Clean your contacts and storage case. If you wear disposable contacts, use a new pair when your eye has cleared and it is safe to wear contacts again.  If the doctor gave you antibiotic ointment or eyedrops, use them as directed. Use the medicine for as long as instructed, even if your eye starts looking better soon. Keep the bottle tip clean, and do not let it touch the eye area.  To put in eyedrops or ointment:  Tilt your head back, and pull your lower eyelid down with one finger.  Drop or squirt the medicine inside the lower lid.  Close your eye for 30 to 60  "seconds to let the drops or ointment move around.  Do not touch the ointment or dropper tip to your eyelashes or any other surface.  Do not share towels, pillows, or washcloths while you have pinkeye.  When should you call for help?   Call your doctor now or seek immediate medical care if:    You have pain in your eye, not just irritation on the surface.     You have a change in vision or loss of vision.     You have an increase in discharge from the eye.     Your eye has not started to improve or begins to get worse within 48 hours after you start using antibiotics.     Pinkeye lasts longer than 7 days.   Watch closely for changes in your health, and be sure to contact your doctor if you have any problems.  Where can you learn more?  Go to https://www.[employer].net/patiented  Enter Y392 in the search box to learn more about \"Pinkeye: Care Instructions.\"  Current as of: June 6, 2023               Content Version: 13.8    5418-6756 [employer].   Care instructions adapted under license by your healthcare professional. If you have questions about a medical condition or this instruction, always ask your healthcare professional. [employer] disclaims any warranty or liability for your use of this information.      "

## 2024-03-06 NOTE — PROGRESS NOTES
"Assessment & Plan     Acute bacterial conjunctivitis of left eye  - polymixin b-trimethoprim (POLYTRIM) 29247-0.1 UNIT/ML-% ophthalmic solution  Dispense: 10 mL; Refill: 0    Unilateral blurry vision to be expected with conunctivities. No red flag symptoms. Discussed abx drops, no contact use, wash pillowcases, ok to continue using dry eye drops.     Sore throat  - Streptococcus A Rapid Screen w/Reflex to PCR - Clinic Collect  - Group A Streptococcus PCR Throat Swab    RST negative, PCR pending. No signs of peritonsillar abscess, epiglottitis. Afebrile with mild bradycardia. No signs of impending respiratory distress     Return for if symptoms do not improve in 5-7 days.    Krystin Santiago, DO  she/her  [facility] URG[facility]    Subjective     Francisca Williamson is a 68 year old female who presents to clinic today for the following health issues:    HPI    Eye Problem    Onset of symptoms was last night. Woke up twice from eye drainage and eye had lots of mucus covering it   Location: left eye   Watery, crust and mucous discharge  Pt states getting worse.   Some \"fuzzy\" vision, feels like a film there  No itching but \"feels sore\"- like there is sand in her eyes  Uses eye drops for dry eye as needed  No contacts, eyelash extensions    Treatment measures tried include flushed with water  and warm packs    Sore throat since last night. No cough, no fever. No headaches or dizziness   going through chemo    Past Medical History:   Diagnosis Date    B12 deficiency     Breast pain, left 02/21/2023    Celiac disease     Chronic idiopathic constipation 11/08/2021    Diverticulitis     Diverticulosis     GERD (gastroesophageal reflux disease)     states main problem is heartburn    High cholesterol     Mumps     Osteoporosis     Shingles 01/01/2005    Spider veins     STD (sexually transmitted disease)      Allergies   Allergen Reactions    Doxycycline GI Disturbance     Abdominal ileus    Alendronate GI Disturbance    " " Other reaction(s): GI Upset      Clindamycin      Rash, see note from allergy 5/3/21    Gluten Meal GI Disturbance     Celiac disease  Celiac disease      Milk (Cow)      Went Dairy Free in April related to GI issues    Risedronate GI Disturbance    Cephalosporins Rash    Ibuprofen     Nitrofuran Derivatives Other (See Comments) and Rash     Overwhelming nausea    Overwhelming nausea     Current Outpatient Medications   Medication    cyanocobalamin (CYANOCOBALAMIN) 1000 MCG/ML injection    cyclobenzaprine (FLEXERIL) 5 MG tablet    diltiazem 2% in PLO gel    hyoscyamine (LEVSIN) 0.125 MG tablet    Lactobacillus Rhamnosus, GG, ( PROBIOTIC DIGESTIVE CARE) CAPS    latanoprost (XALATAN) 0.005 % ophthalmic solution    METAMUCIL FIBER PO    metroNIDAZOLE (METROCREAM) 0.75 % external cream    polyethylene glycol (MIRALAX) 17 GM/Dose powder    polymixin b-trimethoprim (POLYTRIM) 12577-2.1 UNIT/ML-% ophthalmic solution    pravastatin (PRAVACHOL) 40 MG tablet    timolol maleate (TIMOPTIC) 0.25 % ophthalmic solution    timolol maleate (TIMOPTIC) 0.5 % ophthalmic solution    Tuberculin-Allergy Syringes (BD SYRINGE SLIP TIP) 25G X 5/8\" 1 ML MISC    valACYclovir (VALTREX) 500 MG tablet    VITAMIN D PO    estradiol (ESTRACE) 0.1 MG/GM vaginal cream    gabapentin (NEURONTIN) 100 MG capsule     No current facility-administered medications for this visit.        Review of Systems  Constitutional, HEENT, cardiovascular, pulmonary, gi and gu systems are negative, except as otherwise noted.      Objective    /78   Pulse 58   Temp 98.8  F (37.1  C) (Tympanic)   Resp 20   SpO2 97%     Physical Exam  Constitutional:       Appearance: Normal appearance.   HENT:      Nose: Nose normal.      Mouth/Throat:      Mouth: Mucous membranes are moist.      Pharynx: Posterior oropharyngeal erythema present. No uvula swelling.      Tonsils: No tonsillar exudate or tonsillar abscesses.   Eyes:      Extraocular Movements: Extraocular " movements intact.      Conjunctiva/sclera:      Right eye: Right conjunctiva is not injected.      Left eye: Left conjunctiva is injected. Exudate (yellow/green with matting on eyelashes) present.      Pupils: Pupils are equal, round, and reactive to light.      Comments: Left eyelid with swelling   Neurological:      General: No focal deficit present.      Mental Status: She is alert.      Coordination: Coordination is intact.        Results for orders placed or performed in visit on 03/06/24   Streptococcus A Rapid Screen w/Reflex to PCR - Clinic Collect     Status: Normal    Specimen: Throat; Swab   Result Value Ref Range    Group A Strep antigen Negative Negative           The use of Dragon/[employer] dictation services may have been used to construct the content in this note; any grammatical or spelling errors are non-intentional. Please contact the author of this note directly if you are in need of any clarification.

## 2024-03-07 DIAGNOSIS — H10.32 ACUTE BACTERIAL CONJUNCTIVITIS OF LEFT EYE: ICD-10-CM

## 2024-03-07 RX ORDER — POLYMYXIN B SULFATE AND TRIMETHOPRIM 1; 10000 MG/ML; [USP'U]/ML
SOLUTION OPHTHALMIC
Qty: 10 ML | Refills: 0 | Status: SHIPPED | OUTPATIENT
Start: 2024-03-07 | End: 2024-05-01

## 2024-03-18 ENCOUNTER — OFFICE VISIT (OUTPATIENT)
Dept: UROLOGY | Facility: CLINIC | Age: 69
End: 2024-03-18
Payer: COMMERCIAL

## 2024-03-18 VITALS
HEIGHT: 65 IN | BODY MASS INDEX: 26.33 KG/M2 | WEIGHT: 158 LBS | HEART RATE: 63 BPM | OXYGEN SATURATION: 97 % | SYSTOLIC BLOOD PRESSURE: 126 MMHG | DIASTOLIC BLOOD PRESSURE: 81 MMHG

## 2024-03-18 DIAGNOSIS — R31.29 MICROSCOPIC HEMATURIA: Primary | ICD-10-CM

## 2024-03-18 LAB
ALBUMIN UR-MCNC: NEGATIVE MG/DL
APPEARANCE UR: CLEAR
BILIRUB UR QL STRIP: NEGATIVE
COLOR UR AUTO: YELLOW
GLUCOSE UR STRIP-MCNC: NEGATIVE MG/DL
HGB UR QL STRIP: ABNORMAL
KETONES UR STRIP-MCNC: NEGATIVE MG/DL
LEUKOCYTE ESTERASE UR QL STRIP: NEGATIVE
NITRATE UR QL: NEGATIVE
PH UR STRIP: 7 [PH] (ref 5–7)
RBC URINE: <1 /HPF
SP GR UR STRIP: 1.01 (ref 1–1.03)
UROBILINOGEN UR STRIP-ACNC: 0.2 E.U./DL
WBC URINE: 0 /HPF

## 2024-03-18 PROCEDURE — 88112 CYTOPATH CELL ENHANCE TECH: CPT | Performed by: PATHOLOGY

## 2024-03-18 PROCEDURE — 81001 URINALYSIS AUTO W/SCOPE: CPT | Performed by: PHYSICIAN ASSISTANT

## 2024-03-18 PROCEDURE — 99214 OFFICE O/P EST MOD 30 MIN: CPT | Performed by: PHYSICIAN ASSISTANT

## 2024-03-18 RX ORDER — NEOMYCIN SULFATE, POLYMYXIN B SULFATE AND GRAMICIDIN 1.75; 10000; .025 MG/ML; [USP'U]/ML; MG/ML
1 SOLUTION/ DROPS OPHTHALMIC 4 TIMES DAILY
COMMUNITY
Start: 2024-03-08 | End: 2024-05-01 | Stop reason: SINTOL

## 2024-03-18 ASSESSMENT — PAIN SCALES - GENERAL: PAINLEVEL: NO PAIN (0)

## 2024-03-18 NOTE — PATIENT INSTRUCTIONS
Shanell Booker PA-C  [facility]  Office: [phone]  _________________________________________  - Urine cytology to look for abnormal cells. Please make an appointment at your local [place] lab to leave a urine sample ([id number]).  - CT scan of the kidneys. You can call [phone] to schedule this.  - My office will call you to arrange a cystoscopy with the  urologist to evaluate the interior of the bladder. Follow up as recommended by the urologist.    CYSTOSCOPY    What is a Cystoscopy?  This is a procedure done to check for problems inside the bladder.  Problems may include polyps (growths), tumors, inflammation (swelling and redness) and other concerns.    The Urologist inserts a thin tube (called a cystoscope) into the bladder.  The tube is about the size of a pencil.  We will give you numbing medicine to reduce the pain or discomfort you may feel.    The Urologist will be able to see inside the bladder by filling the bladder with water.  The water makes it easier to see any problems that may be present. You will have a sense to need to urinate and this is normal.       How should I get ready for the exam?  Nothing to do to prepare. You may eat normally the day of the exam. There is no sedation, so you may drive yourself to and from if you can drive.       Please tell your doctor if:  You have a history of urinary tract infections.  You know that you have a tumor in your bladder.  You have bleeding problems.  You have any allergies.  You are or may be pregnant.      What happens after the exam?  You may go back to your normal diet and activity as you feel ready.    For the next two days after the exam, you may notice:  Some blood in your urine.  Some burning when you urinate (use the toilet).  An urge to urinate more often.  Bladder spasms.    These are normal after the procedure. They should go away on their own after a day or two.      You can help to relieve the above listed symptoms  by:  Drinking 6 to 8 large glasses of water each day (includes drinks at meals).  This will help clear the urine.  Take warm baths to relieve pain and bladder spasms.  Do not add anything to the bath water.  You may take Tylenol (acetaminophen) per label instructions for discomfort.

## 2024-03-18 NOTE — LETTER
3/18/2024       RE: Francisca Williamson  [address]  [place] [address]     Dear Colleague,    Thank you for referring your patient, Francisca Williamson, to the [facility] at [facility]. Please see a copy of my visit note below.    CC: abnormal UA    HPI:  Francisca Williamson is a pleasant 68 year old female who presents in follow-up of the above. Seen last year with abnormal UAs and normal microscpy--as outlined prior: UA with small blood (no micro exam). UC mixed. Repeat UA with PCP in Aug 2023 and UA with micro normal. Returned in Nov for UA with Dr. Westfall and UA with small blood (no micro exam) and UC again neg. Increased her topical estrogen. Can have symptoms intermittently. UA last year with trace blood.     No gross hematuria.     TODAY 3/18/24:  -UA 2-5 RBCs/HPF 1/16/24  -No gross hematuria    Past Medical History:   Diagnosis Date    B12 deficiency     Breast pain, left 02/21/2023    Celiac disease     Chronic idiopathic constipation 11/08/2021    Diverticulitis     Diverticulosis     GERD (gastroesophageal reflux disease)     states main problem is heartburn    High cholesterol     Mumps     Osteoporosis     Shingles 01/01/2005    Spider veins     STD (sexually transmitted disease)        Past Surgical History:   Procedure Laterality Date    APPENDECTOMY      age 13    DILATION AND CURETTAGE, DIAGNOSTIC / THERAPEUTIC      age 38, for endometriosis       Social History     Socioeconomic History    Marital status:      Spouse name: Not on file    Number of children: Not on file    Years of education: Not on file    Highest education level: Not on file   Occupational History    Not on file   Tobacco Use    Smoking status: Never     Passive exposure: Never    Smokeless tobacco: Never   Vaping Use    Vaping Use: Never used   Substance and Sexual Activity    Alcohol use: Yes     Alcohol/week: 7.0 standard drinks of alcohol    Drug use: No     Sexual activity: Yes     Partners: Male     Birth control/protection: None   Other Topics Concern    Parent/sibling w/ CABG, MI or angioplasty before 65F 55M? No   Social History Narrative     27 years.   Has 2 children from 's first marriage. Has 3 grandchildren.   She has not had any pregnancy.  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 Has 4th grandchild on the way    12/13/21    Dorota T Highness       Social Determinants of Health     Financial Resource Strain: Low Risk  (1/10/2024)    Financial Resource Strain     Within the past 12 months, have you or your family members you live with been unable to get utilities (heat, electricity) when it was really needed?: No   Food Insecurity: Low Risk  (1/10/2024)    Food Insecurity     Within the past 12 months, did you worry that your food would run out before you got money to buy more?: No     Within the past 12 months, did the food you bought just not last and you didn t have money to get more?: No   Transportation Needs: Low Risk  (1/10/2024)    Transportation Needs     Within the past 12 months, has lack of transportation kept you from medical appointments, getting your medicines, non-medical meetings or appointments, work, or from getting things that you need?: No   Physical Activity: Not on file   Stress: Not on file   Social Connections: Not on file   Interpersonal Safety: Low Risk  (1/16/2024)    Interpersonal Safety     Do you feel physically and emotionally safe where you currently live?: Yes     Within the past 12 months, have you been hit, slapped, kicked or otherwise physically hurt by someone?: No     Within the past 12 months, have you been humiliated or emotionally abused in other ways by your partner or ex-partner?: No   Housing Stability: Low Risk  (1/10/2024)    Housing Stability     Do you have housing? : Yes     Are you worried about losing your housing?: No  "      Family History   Problem Relation Age of Onset    Osteoporosis Mother     Cerebrovascular Disease Mother 74    Hyperlipidemia Father         also brother and sister    Dementia Father     Breast Cancer Maternal Grandmother     Colon Cancer Maternal Grandfather     Coronary Artery Disease Brother         also paternal grandfather       Allergies   Allergen Reactions    Doxycycline GI Disturbance     Abdominal ileus    Alendronate GI Disturbance     Other reaction(s): GI Upset      Clindamycin      Rash, see note from allergy 5/3/21    Gluten Meal GI Disturbance     Celiac disease  Celiac disease      Milk (Cow)      Went Dairy Free in April related to GI issues    Risedronate GI Disturbance    Cephalosporins Rash    Ibuprofen     Nitrofuran Derivatives Other (See Comments) and Rash     Overwhelming nausea    Overwhelming nausea       Current Outpatient Medications   Medication    neomycin-polymyxin-gramicidin (NEOSPORIN) 1.75-28638-.025 ophthalmic solution    cyanocobalamin (CYANOCOBALAMIN) 1000 MCG/ML injection    cyclobenzaprine (FLEXERIL) 5 MG tablet    diltiazem 2% in PLO gel    estradiol (ESTRACE) 0.1 MG/GM vaginal cream    gabapentin (NEURONTIN) 100 MG capsule    hyoscyamine (LEVSIN) 0.125 MG tablet    Lactobacillus Rhamnosus, GG, ( PROBIOTIC DIGESTIVE CARE) CAPS    latanoprost (XALATAN) 0.005 % ophthalmic solution    METAMUCIL FIBER PO    metroNIDAZOLE (METROCREAM) 0.75 % external cream    polyethylene glycol (MIRALAX) 17 GM/Dose powder    polymixin b-trimethoprim (POLYTRIM) 22788-2.1 UNIT/ML-% ophthalmic solution    pravastatin (PRAVACHOL) 40 MG tablet    timolol maleate (TIMOPTIC) 0.25 % ophthalmic solution    timolol maleate (TIMOPTIC) 0.5 % ophthalmic solution    Tuberculin-Allergy Syringes (BD SYRINGE SLIP TIP) 25G X 5/8\" 1 ML MISC    valACYclovir (VALTREX) 500 MG tablet    VITAMIN D PO     No current facility-administered medications for this visit.         PEx:   Blood pressure 126/81, pulse 63, " "height 1.651 m (5' 5\"), weight 71.7 kg (158 lb), SpO2 97%, not currently breastfeeding.    PSYCH: NAD  EYES: EOMI  MOUTH: MMM  NEURO: AAO x3    Urine: trace blood      A/P: Francisca Williamson is a 67 year old female with abnormal UAs, but normal micro exams (x2).   -UAs and micro exams reviewed with the patient. She now meets criteria for microscopic hematuria at this time. We discussed CT urogram, cytology, cysto.    -UA with micro today. Will call her with those results. She will think about the CT and cysto. Send cytology today.   -Continue with topical estrogen 3 x per week.     Shanell Booker PA-C  [facility]        22 minutes spent on the date of the encounter doing chart review, review of outside records, review of test results, interpretation of tests, patient visit and documentation     "

## 2024-03-18 NOTE — NURSING NOTE
Chief Complaint   Patient presents with    micro hematuria    Not seen any blood in the urine   Diogenes Kent, CMA

## 2024-03-18 NOTE — PROGRESS NOTES
CC: abnormal UA    HPI:  Francisca Williamson is a pleasant 68 year old female who presents in follow-up of the above. Seen last year with abnormal UAs and normal microscpy--as outlined prior: UA with small blood (no micro exam). UC mixed. Repeat UA with PCP in Aug 2023 and UA with micro normal. Returned in Nov for UA with Dr. Westfall and UA with small blood (no micro exam) and UC again neg. Increased her topical estrogen. Can have symptoms intermittently. UA last year with trace blood.     No gross hematuria.     TODAY 3/18/24:  -UA 2-5 RBCs/HPF 1/16/24  -No gross hematuria    Past Medical History:   Diagnosis Date    B12 deficiency     Breast pain, left 02/21/2023    Celiac disease     Chronic idiopathic constipation 11/08/2021    Diverticulitis     Diverticulosis     GERD (gastroesophageal reflux disease)     states main problem is heartburn    High cholesterol     Mumps     Osteoporosis     Shingles 01/01/2005    Spider veins     STD (sexually transmitted disease)        Past Surgical History:   Procedure Laterality Date    APPENDECTOMY      age 13    DILATION AND CURETTAGE, DIAGNOSTIC / THERAPEUTIC      age 38, for endometriosis       Social History     Socioeconomic History    Marital status:      Spouse name: Not on file    Number of children: Not on file    Years of education: Not on file    Highest education level: Not on file   Occupational History    Not on file   Tobacco Use    Smoking status: Never     Passive exposure: Never    Smokeless tobacco: Never   Vaping Use    Vaping Use: Never used   Substance and Sexual Activity    Alcohol use: Yes     Alcohol/week: 7.0 standard drinks of alcohol    Drug use: No    Sexual activity: Yes     Partners: Male     Birth control/protection: None   Other Topics Concern    Parent/sibling w/ CABG, MI or angioplasty before 65F 55M? No   Social History Narrative     27 years.   Has 2 children from 's first marriage. Has 3 grandchildren.   She has not had  any pregnancy.  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 Has 4th grandchild on the way    12/13/21    [facility]       Social Determinants of Health     Financial Resource Strain: Low Risk  (1/10/2024)    Financial Resource Strain     Within the past 12 months, have you or your family members you live with been unable to get utilities (heat, electricity) when it was really needed?: No   Food Insecurity: Low Risk  (1/10/2024)    Food Insecurity     Within the past 12 months, did you worry that your food would run out before you got money to buy more?: No     Within the past 12 months, did the food you bought just not last and you didn t have money to get more?: No   Transportation Needs: Low Risk  (1/10/2024)    Transportation Needs     Within the past 12 months, has lack of transportation kept you from medical appointments, getting your medicines, non-medical meetings or appointments, work, or from getting things that you need?: No   Physical Activity: Not on file   Stress: Not on file   Social Connections: Not on file   Interpersonal Safety: Low Risk  (1/16/2024)    Interpersonal Safety     Do you feel physically and emotionally safe where you currently live?: Yes     Within the past 12 months, have you been hit, slapped, kicked or otherwise physically hurt by someone?: No     Within the past 12 months, have you been humiliated or emotionally abused in other ways by your partner or ex-partner?: No   Housing Stability: Low Risk  (1/10/2024)    Housing Stability     Do you have housing? : Yes     Are you worried about losing your housing?: No       Family History   Problem Relation Age of Onset    Osteoporosis Mother     Cerebrovascular Disease Mother 74    Hyperlipidemia Father         also brother and sister    Dementia Father     Breast Cancer Maternal Grandmother     Colon Cancer Maternal Grandfather     Coronary Artery Disease Brother          "also paternal grandfather       Allergies   Allergen Reactions    Doxycycline GI Disturbance     Abdominal ileus    Alendronate GI Disturbance     Other reaction(s): GI Upset      Clindamycin      Rash, see note from allergy 5/3/21    Gluten Meal GI Disturbance     Celiac disease  Celiac disease      Milk (Cow)      Went Dairy Free in April related to GI issues    Risedronate GI Disturbance    Cephalosporins Rash    Ibuprofen     Nitrofuran Derivatives Other (See Comments) and Rash     Overwhelming nausea    Overwhelming nausea       Current Outpatient Medications   Medication    neomycin-polymyxin-gramicidin (NEOSPORIN) 1.75-16124-.025 ophthalmic solution    cyanocobalamin (CYANOCOBALAMIN) 1000 MCG/ML injection    cyclobenzaprine (FLEXERIL) 5 MG tablet    diltiazem 2% in PLO gel    estradiol (ESTRACE) 0.1 MG/GM vaginal cream    gabapentin (NEURONTIN) 100 MG capsule    hyoscyamine (LEVSIN) 0.125 MG tablet    Lactobacillus Rhamnosus, GG, ( PROBIOTIC DIGESTIVE CARE) CAPS    latanoprost (XALATAN) 0.005 % ophthalmic solution    METAMUCIL FIBER PO    metroNIDAZOLE (METROCREAM) 0.75 % external cream    polyethylene glycol (MIRALAX) 17 GM/Dose powder    polymixin b-trimethoprim (POLYTRIM) 76103-4.1 UNIT/ML-% ophthalmic solution    pravastatin (PRAVACHOL) 40 MG tablet    timolol maleate (TIMOPTIC) 0.25 % ophthalmic solution    timolol maleate (TIMOPTIC) 0.5 % ophthalmic solution    Tuberculin-Allergy Syringes (BD SYRINGE SLIP TIP) 25G X 5/8\" 1 ML MISC    valACYclovir (VALTREX) 500 MG tablet    VITAMIN D PO     No current facility-administered medications for this visit.         PEx:   Blood pressure 126/81, pulse 63, height 1.651 m (5' 5\"), weight 71.7 kg (158 lb), SpO2 97%, not currently breastfeeding.    PSYCH: NAD  EYES: EOMI  MOUTH: MMM  NEURO: AAO x3    Urine: trace blood      A/P: Francisca Williamson is a 67 year old female with abnormal UAs, but normal micro exams (x2).   -UAs and micro exams reviewed with the patient. " She now meets criteria for microscopic hematuria at this time. We discussed CT urogram, cytology, cysto.    -UA with micro today. Will call her with those results. She will think about the CT and cysto. Send cytology today.   -Continue with topical estrogen 3 x per week.     Shanell Booker PA-C  [facility]        22 minutes spent on the date of the encounter doing chart review, review of outside records, review of test results, interpretation of tests, patient visit and documentation

## 2024-03-19 DIAGNOSIS — R31.29 MICROSCOPIC HEMATURIA: Primary | ICD-10-CM

## 2024-03-19 LAB
PATH REPORT.COMMENTS IMP SPEC: NORMAL
PATH REPORT.FINAL DX SPEC: NORMAL
PATH REPORT.GROSS SPEC: NORMAL
PATH REPORT.MICROSCOPIC SPEC OTHER STN: NORMAL
PATH REPORT.RELEVANT HX SPEC: NORMAL

## 2024-03-20 ENCOUNTER — ANCILLARY PROCEDURE (OUTPATIENT)
Dept: MAMMOGRAPHY | Facility: CLINIC | Age: 69
End: 2024-03-20
Attending: INTERNAL MEDICINE
Payer: COMMERCIAL

## 2024-03-20 DIAGNOSIS — Z12.31 VISIT FOR SCREENING MAMMOGRAM: ICD-10-CM

## 2024-03-20 PROCEDURE — 77063 BREAST TOMOSYNTHESIS BI: CPT

## 2024-04-18 ENCOUNTER — LAB (OUTPATIENT)
Dept: LAB | Facility: CLINIC | Age: 69
End: 2024-04-18
Payer: COMMERCIAL

## 2024-04-18 DIAGNOSIS — K58.1 IRRITABLE BOWEL SYNDROME WITH CONSTIPATION: ICD-10-CM

## 2024-04-18 DIAGNOSIS — K90.0 CELIAC DISEASE: ICD-10-CM

## 2024-04-18 DIAGNOSIS — M81.0 OSTEOPOROSIS, UNSPECIFIED OSTEOPOROSIS TYPE, UNSPECIFIED PATHOLOGICAL FRACTURE PRESENCE: ICD-10-CM

## 2024-04-18 PROCEDURE — 83970 ASSAY OF PARATHORMONE: CPT

## 2024-04-18 PROCEDURE — 84443 ASSAY THYROID STIM HORMONE: CPT

## 2024-04-18 PROCEDURE — 82306 VITAMIN D 25 HYDROXY: CPT

## 2024-04-18 PROCEDURE — 36415 COLL VENOUS BLD VENIPUNCTURE: CPT

## 2024-04-18 PROCEDURE — 80053 COMPREHEN METABOLIC PANEL: CPT

## 2024-04-19 LAB
ALBUMIN SERPL BCG-MCNC: 4.4 G/DL (ref 3.5–5.2)
ALP SERPL-CCNC: 48 U/L (ref 40–150)
ALT SERPL W P-5'-P-CCNC: 14 U/L (ref 0–50)
ANION GAP SERPL CALCULATED.3IONS-SCNC: 8 MMOL/L (ref 7–15)
AST SERPL W P-5'-P-CCNC: 24 U/L (ref 0–45)
BILIRUB SERPL-MCNC: 0.3 MG/DL
BUN SERPL-MCNC: 12.3 MG/DL (ref 8–23)
CALCIUM SERPL-MCNC: 9.4 MG/DL (ref 8.8–10.2)
CHLORIDE SERPL-SCNC: 104 MMOL/L (ref 98–107)
CREAT SERPL-MCNC: 0.86 MG/DL (ref 0.51–0.95)
DEPRECATED HCO3 PLAS-SCNC: 25 MMOL/L (ref 22–29)
EGFRCR SERPLBLD CKD-EPI 2021: 72 ML/MIN/1.73M2
GLUCOSE SERPL-MCNC: 96 MG/DL (ref 70–99)
POTASSIUM SERPL-SCNC: 4.3 MMOL/L (ref 3.4–5.3)
PROT SERPL-MCNC: 7.1 G/DL (ref 6.4–8.3)
PTH-INTACT SERPL-MCNC: 60 PG/ML (ref 15–65)
SODIUM SERPL-SCNC: 137 MMOL/L (ref 135–145)
TSH SERPL DL<=0.005 MIU/L-ACNC: 3.38 UIU/ML (ref 0.3–4.2)
VIT D+METAB SERPL-MCNC: 47 NG/ML (ref 20–50)

## 2024-05-01 ENCOUNTER — OFFICE VISIT (OUTPATIENT)
Dept: ENDOCRINOLOGY | Facility: CLINIC | Age: 69
End: 2024-05-01
Payer: COMMERCIAL

## 2024-05-01 VITALS
SYSTOLIC BLOOD PRESSURE: 110 MMHG | DIASTOLIC BLOOD PRESSURE: 68 MMHG | WEIGHT: 161 LBS | HEART RATE: 60 BPM | BODY MASS INDEX: 26.82 KG/M2 | HEIGHT: 65 IN

## 2024-05-01 DIAGNOSIS — K90.0 CELIAC DISEASE: ICD-10-CM

## 2024-05-01 DIAGNOSIS — M81.0 OSTEOPOROSIS, UNSPECIFIED OSTEOPOROSIS TYPE, UNSPECIFIED PATHOLOGICAL FRACTURE PRESENCE: Primary | ICD-10-CM

## 2024-05-01 PROCEDURE — 99214 OFFICE O/P EST MOD 30 MIN: CPT | Performed by: INTERNAL MEDICINE

## 2024-05-01 RX ORDER — METHYLPREDNISOLONE SODIUM SUCCINATE 125 MG/2ML
125 INJECTION, POWDER, LYOPHILIZED, FOR SOLUTION INTRAMUSCULAR; INTRAVENOUS
Status: CANCELLED
Start: 2024-05-01

## 2024-05-01 RX ORDER — ALBUTEROL SULFATE 90 UG/1
1-2 AEROSOL, METERED RESPIRATORY (INHALATION)
Status: CANCELLED
Start: 2024-05-01

## 2024-05-01 RX ORDER — DIPHENHYDRAMINE HYDROCHLORIDE 50 MG/ML
50 INJECTION INTRAMUSCULAR; INTRAVENOUS
Status: CANCELLED
Start: 2024-05-01

## 2024-05-01 RX ORDER — ALBUTEROL SULFATE 0.83 MG/ML
2.5 SOLUTION RESPIRATORY (INHALATION)
Status: CANCELLED | OUTPATIENT
Start: 2024-05-01

## 2024-05-01 RX ORDER — HEPARIN SODIUM,PORCINE 10 UNIT/ML
5-20 VIAL (ML) INTRAVENOUS DAILY PRN
Status: CANCELLED | OUTPATIENT
Start: 2024-05-01

## 2024-05-01 RX ORDER — HEPARIN SODIUM (PORCINE) LOCK FLUSH IV SOLN 100 UNIT/ML 100 UNIT/ML
5 SOLUTION INTRAVENOUS
Status: CANCELLED | OUTPATIENT
Start: 2024-05-01

## 2024-05-01 RX ORDER — EPINEPHRINE 1 MG/ML
0.3 INJECTION, SOLUTION, CONCENTRATE INTRAVENOUS EVERY 5 MIN PRN
Status: CANCELLED | OUTPATIENT
Start: 2024-05-01

## 2024-05-01 RX ORDER — ZOLEDRONIC ACID 5 MG/100ML
5 INJECTION, SOLUTION INTRAVENOUS ONCE
Status: CANCELLED
Start: 2024-05-01

## 2024-05-01 NOTE — LETTER
5/1/2024         RE: Francisca Williamson  [address] [place] [address]        Dear Colleague,    Thank you for referring your patient, Francisca Williamson, to the [facility]. Please see a copy of my visit note below.      ENDOCRINOLOGY FOLLOW-UP      HISTORY OF PRESENT ILLNESS    Francisca Williamson is seen in follow-up.    I referred patient for Reclast infusion after last visit in 4/2023.  She received Reclast on 4/25/2023.  She tolerated this without side effects.    No falls or fractures in the interim since last visit.    She remains on a gluten-free and dairy free diet.    She notes today that she has been succeeding at getting 1200 mg of calcium per day consistently from her diet.    Continues on vitamin D3 3000 IU daily.    Pertinent endocrine and related history:  1.  Osteoporosis.  Previously diagnosed with osteoporosis and was initially treated with Fosamax which resulted in heartburn.  Therefore, she was transitioned to Reclast.  Some progress notes indicate she was treated with 3 doses of Reclast.  However, the patient recalls she may have received 2 doses.  Records in epic also indicate that she received a dose in 2014 and 2015: I do not see documentation for dose in 2016.  She tolerated Reclast without side effect.  -No history of fracture.  -Osteoporosis is potentially related to postmenopausal status and history of celiac disease with insufficient calcium intake/absorption--we noted hypocalciuria at initial work-up, with improvement in urine calcium excretion after adjusting dietary calcium intake.  Our work-up for other secondary causes of osteoporosis was otherwise been unremarkable (normal SPEP/UPEP, thyroid function tests, PTH, CMP).    -Most recent DXA completed on 2/27/2023 showed significant decline in BMD.  ~L1-L4 T score -2.8, 5.3% decline in BMD compared to prior study in 2021 (significant).  ~Left femoral neck T score -1.2, left total hip T score -1.4.  ~Right femoral  neck T score -1.2, right total hip T score -1.5.  Total mean hip 3.7% decline in BMD compared to 2021 (significant).  -We reinitiated Reclast after DXA showed decline in BMD, received dose on 4/25/2023.  2.  Celiac disease.  Follows strict gluten free diet. Also following a dairy free diet.    Pertinent Social History: , stepchildren. 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    PAST MEDICAL HISTORY  Past Medical History:   Diagnosis Date     B12 deficiency      Breast pain, left 02/21/2023     Celiac disease      Chronic idiopathic constipation 11/08/2021     Diverticulitis      Diverticulosis      GERD (gastroesophageal reflux disease)     states main problem is heartburn     High cholesterol      Mumps      Osteoporosis      Shingles 01/01/2005     Spider veins      STD (sexually transmitted disease)        MEDICATIONS  Current Outpatient Medications   Medication Sig Dispense Refill     cyanocobalamin (CYANOCOBALAMIN) 1000 MCG/ML injection INJECT 1 ML INTO THE SHOULDER, THIGH, OR BUTTOCKS, EVERY 30 DAYS 3 mL 2     cyclobenzaprine (FLEXERIL) 5 MG tablet Take 1 tablet (5 mg) by mouth 3 times daily as needed for muscle spasms 30 tablet 1     diltiazem 2% in PLO gel Apply pea sized amount 2 times daily as needed 30 g 0     estradiol (ESTRACE) 0.1 MG/GM vaginal cream Place vaginally three times a week 42.5 g 4     hyoscyamine (LEVSIN) 0.125 MG tablet TAKE 1 TABLET BY MOUTH UP TO FOUR TIMES DAILY AS NEEDED FOR SPASMS       Lactobacillus Rhamnosus, GG, (RA PROBIOTIC DIGESTIVE CARE) CAPS Take 1 capsule by mouth       latanoprost (XALATAN) 0.005 % ophthalmic solution INT 1 GTT IN EACH EYE ONCE D       METAMUCIL FIBER PO        metroNIDAZOLE (METROCREAM) 0.75 % external cream Apply topically 2 times daily 45 g 1     polyethylene glycol (MIRALAX) 17 GM/Dose powder Take 17 g by mouth       pravastatin (PRAVACHOL) 40 MG tablet Take 1 tablet (40 mg) by mouth daily 90 tablet 3     timolol maleate (TIMOPTIC) 0.5 %  "ophthalmic solution Place 1 drop into both eyes every morning       Tuberculin-Allergy Syringes (BD SYRINGE SLIP TIP) 25G X 5/8\" 1 ML MISC FOR HOME  each 0     valACYclovir (VALTREX) 500 MG tablet TAKE 1 TABLET(500 MG) BY MOUTH TWICE DAILY FOR 3 DAYS 6 tablet 3     VITAMIN D PO Take 3,000 Units by mouth         Allergies, family, and social history were reviewed and documented as needed in EHR.     REVIEW OF SYSTEMS  A focused ROS was performed, with pertinent positives and negatives as noted in the HPI.    PHYSICAL EXAM  /68 (BP Location: Right arm, Patient Position: Sitting, Cuff Size: Adult Regular)   Pulse 60   Ht 1.645 m (5' 4.76\")   Wt 73 kg (161 lb)   BMI 26.99 kg/m    Body mass index is 26.99 kg/m .  Constitutional: Vital signs reviewed, as recorded above. Patient is alert, oriented and appears in no acute distress.  Eyes: PER, EOMI, no stare, lid lag, or retraction; no conjunctival injection.  Neck: Neck supple, no palpable thyromegaly.  Cardiovascular: RRR, normal S1/S2, no audible murmurs, rubs or gallops.  Respiratory: CTAB, without wheezes, crackles or rhonchi; normal chest wall motion and respiratory effort.  MSK: No clubbing or cyanosis; normal muscle bulk and tone.  Skin: Normal skin color, temperature, turgor and texture.  Neurological: Alert and oriented times 3. No tremor.    DATA REVIEW  Each of the following laboratory and/or imaging studies were reviewed.            ASSESSMENT  1.  Osteoporosis.  Without history of low trauma fracture, nor occult fracture on spine x-rays.  Potentially related to postmenopausal status and history of celiac disease.  We reinitiated Reclast in 4/2023 and she tolerated this without side effect.  We will anticipate second dose in the next few weeks (order placed).  Continue dietary calcium intake as well as vitamin D supplement without changes (vitamin D level drawn prior to this visit is optimal).  We will follow-up in 1 year, with labs and DXA " scan to be completed prior to visit.    2.  Celiac disease.  Following strict gluten-free diet.    PLAN  -Reclast infusion in the coming few weeks  -Continue to aim for at least 1200 mg of calcium from diet  -Continue vitamin D at 3000 IU daily  -Return for a follow-up visit in one year, with labs and DXA scan before visit (DXA scan should be done on the same machine as prior study at [place] location)   -We will communicate results via [email or web address], or if needed by phone      Orders Placed This Encounter   Procedures     DX Bone Density     Vitamin D Deficiency     Comprehensive metabolic panel     Parathyroid Hormone Intact       Rajwinder Rucker MD   Division of Diabetes, Endocrinology and Metabolism  Department of Medicine        Again, thank you for allowing me to participate in the care of your patient.        Sincerely,        Rajwinder Rucker MD

## 2024-05-01 NOTE — PROGRESS NOTES
ENDOCRINOLOGY FOLLOW-UP      HISTORY OF PRESENT ILLNESS    Francisca Williamson is seen in follow-up.    I referred patient for Reclast infusion after last visit in 4/2023.  She received Reclast on 4/25/2023.  She tolerated this without side effects.    No falls or fractures in the interim since last visit.    She remains on a gluten-free and dairy free diet.    She notes today that she has been succeeding at getting 1200 mg of calcium per day consistently from her diet.    Continues on vitamin D3 3000 IU daily.    Pertinent endocrine and related history:  1.  Osteoporosis.  Previously diagnosed with osteoporosis and was initially treated with Fosamax which resulted in heartburn.  Therefore, she was transitioned to Reclast.  Some progress notes indicate she was treated with 3 doses of Reclast.  However, the patient recalls she may have received 2 doses.  Records in epic also indicate that she received a dose in 2014 and 2015: I do not see documentation for dose in 2016.  She tolerated Reclast without side effect.  -No history of fracture.  -Osteoporosis is potentially related to postmenopausal status and history of celiac disease with insufficient calcium intake/absorption--we noted hypocalciuria at initial work-up, with improvement in urine calcium excretion after adjusting dietary calcium intake.  Our work-up for other secondary causes of osteoporosis was otherwise been unremarkable (normal SPEP/UPEP, thyroid function tests, PTH, CMP).    -Most recent DXA completed on 2/27/2023 showed significant decline in BMD.  ~L1-L4 T score -2.8, 5.3% decline in BMD compared to prior study in 2021 (significant).  ~Left femoral neck T score -1.2, left total hip T score -1.4.  ~Right femoral neck T score -1.2, right total hip T score -1.5.  Total mean hip 3.7% decline in BMD compared to 2021 (significant).  -We reinitiated Reclast after DXA showed decline in BMD, received dose on 4/25/2023.  2.  Celiac disease.  Follows strict gluten  "free diet. Also following a dairy free diet.    Pertinent Social History: , stepchildren. 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    PAST MEDICAL HISTORY  Past Medical History:   Diagnosis Date    B12 deficiency     Breast pain, left 02/21/2023    Celiac disease     Chronic idiopathic constipation 11/08/2021    Diverticulitis     Diverticulosis     GERD (gastroesophageal reflux disease)     states main problem is heartburn    High cholesterol     Mumps     Osteoporosis     Shingles 01/01/2005    Spider veins     STD (sexually transmitted disease)        MEDICATIONS  Current Outpatient Medications   Medication Sig Dispense Refill    cyanocobalamin (CYANOCOBALAMIN) 1000 MCG/ML injection INJECT 1 ML INTO THE SHOULDER, THIGH, OR BUTTOCKS, EVERY 30 DAYS 3 mL 2    cyclobenzaprine (FLEXERIL) 5 MG tablet Take 1 tablet (5 mg) by mouth 3 times daily as needed for muscle spasms 30 tablet 1    diltiazem 2% in PLO gel Apply pea sized amount 2 times daily as needed 30 g 0    estradiol (ESTRACE) 0.1 MG/GM vaginal cream Place vaginally three times a week 42.5 g 4    hyoscyamine (LEVSIN) 0.125 MG tablet TAKE 1 TABLET BY MOUTH UP TO FOUR TIMES DAILY AS NEEDED FOR SPASMS      Lactobacillus Rhamnosus, GG, ( PROBIOTIC DIGESTIVE CARE) CAPS Take 1 capsule by mouth      latanoprost (XALATAN) 0.005 % ophthalmic solution INT 1 GTT IN EACH EYE ONCE D      METAMUCIL FIBER PO       metroNIDAZOLE (METROCREAM) 0.75 % external cream Apply topically 2 times daily 45 g 1    polyethylene glycol (MIRALAX) 17 GM/Dose powder Take 17 g by mouth      pravastatin (PRAVACHOL) 40 MG tablet Take 1 tablet (40 mg) by mouth daily 90 tablet 3    timolol maleate (TIMOPTIC) 0.5 % ophthalmic solution Place 1 drop into both eyes every morning      Tuberculin-Allergy Syringes (BD SYRINGE SLIP TIP) 25G X 5/8\" 1 ML MISC FOR HOME  each 0    valACYclovir (VALTREX) 500 MG tablet TAKE 1 TABLET(500 MG) BY MOUTH TWICE DAILY FOR 3 DAYS 6 tablet 3    " "VITAMIN D PO Take 3,000 Units by mouth         Allergies, family, and social history were reviewed and documented as needed in EHR.     REVIEW OF SYSTEMS  A focused ROS was performed, with pertinent positives and negatives as noted in the HPI.    PHYSICAL EXAM  /68 (BP Location: Right arm, Patient Position: Sitting, Cuff Size: Adult Regular)   Pulse 60   Ht 1.645 m (5' 4.76\")   Wt 73 kg (161 lb)   BMI 26.99 kg/m    Body mass index is 26.99 kg/m .  Constitutional: Vital signs reviewed, as recorded above. Patient is alert, oriented and appears in no acute distress.  Eyes: PER, EOMI, no stare, lid lag, or retraction; no conjunctival injection.  Neck: Neck supple, no palpable thyromegaly.  Cardiovascular: RRR, normal S1/S2, no audible murmurs, rubs or gallops.  Respiratory: CTAB, without wheezes, crackles or rhonchi; normal chest wall motion and respiratory effort.  MSK: No clubbing or cyanosis; normal muscle bulk and tone.  Skin: Normal skin color, temperature, turgor and texture.  Neurological: Alert and oriented times 3. No tremor.    DATA REVIEW  Each of the following laboratory and/or imaging studies were reviewed.            ASSESSMENT  1.  Osteoporosis.  Without history of low trauma fracture, nor occult fracture on spine x-rays.  Potentially related to postmenopausal status and history of celiac disease.  We reinitiated Reclast in 4/2023 and she tolerated this without side effect.  We will anticipate second dose in the next few weeks (order placed).  Continue dietary calcium intake as well as vitamin D supplement without changes (vitamin D level drawn prior to this visit is optimal).  We will follow-up in 1 year, with labs and DXA scan to be completed prior to visit.    2.  Celiac disease.  Following strict gluten-free diet.    PLAN  -Reclast infusion in the coming few weeks  -Continue to aim for at least 1200 mg of calcium from diet  -Continue vitamin D at 3000 IU daily  -Return for a follow-up visit " in one year, with labs and DXA scan before visit (DXA scan should be done on the same machine as prior study at [place] location)   -We will communicate results via [employer], or if needed by phone      Orders Placed This Encounter   Procedures    DX Bone Density    Vitamin D Deficiency    Comprehensive metabolic panel    Parathyroid Hormone Intact       Rajwinder Rucker MD   Division of Diabetes, Endocrinology and Metabolism  Department of Medicine

## 2024-05-01 NOTE — PATIENT INSTRUCTIONS
-Reclast infusion in the coming few weeks; Should hear from infusion center, if not cory call [facility] at [phone]  -Continue to aim for at least 1200 mg of calcium from diet  -Continue vitamin D at 3000 IU daily  -Return for a follow-up visit in one year, with labs and DXA scan before visit (DXA scan should be done on the same machine as prior study at [place] location)   -We will communicate results via "[employer]t, or if needed by phone

## 2024-05-14 ENCOUNTER — INFUSION THERAPY VISIT (OUTPATIENT)
Dept: INFUSION THERAPY | Facility: HOSPITAL | Age: 69
End: 2024-05-14
Payer: COMMERCIAL

## 2024-05-14 VITALS
TEMPERATURE: 97.6 F | HEART RATE: 59 BPM | BODY MASS INDEX: 26.33 KG/M2 | HEIGHT: 65 IN | DIASTOLIC BLOOD PRESSURE: 57 MMHG | RESPIRATION RATE: 16 BRPM | WEIGHT: 158 LBS | OXYGEN SATURATION: 98 % | SYSTOLIC BLOOD PRESSURE: 108 MMHG

## 2024-05-14 DIAGNOSIS — M81.0 OSTEOPOROSIS, UNSPECIFIED OSTEOPOROSIS TYPE, UNSPECIFIED PATHOLOGICAL FRACTURE PRESENCE: Primary | ICD-10-CM

## 2024-05-14 PROCEDURE — 250N000011 HC RX IP 250 OP 636: Mod: JZ | Performed by: INTERNAL MEDICINE

## 2024-05-14 PROCEDURE — 258N000003 HC RX IP 258 OP 636: Performed by: INTERNAL MEDICINE

## 2024-05-14 PROCEDURE — 96365 THER/PROPH/DIAG IV INF INIT: CPT

## 2024-05-14 RX ORDER — METHYLPREDNISOLONE SODIUM SUCCINATE 125 MG/2ML
125 INJECTION, POWDER, LYOPHILIZED, FOR SOLUTION INTRAMUSCULAR; INTRAVENOUS
Start: 2025-05-14

## 2024-05-14 RX ORDER — DIPHENHYDRAMINE HYDROCHLORIDE 50 MG/ML
50 INJECTION INTRAMUSCULAR; INTRAVENOUS
Start: 2025-05-14

## 2024-05-14 RX ORDER — ZOLEDRONIC ACID 5 MG/100ML
5 INJECTION, SOLUTION INTRAVENOUS ONCE
Start: 2025-05-14

## 2024-05-14 RX ORDER — ALBUTEROL SULFATE 0.83 MG/ML
2.5 SOLUTION RESPIRATORY (INHALATION)
OUTPATIENT
Start: 2025-05-14

## 2024-05-14 RX ORDER — HEPARIN SODIUM,PORCINE 10 UNIT/ML
5-20 VIAL (ML) INTRAVENOUS DAILY PRN
OUTPATIENT
Start: 2025-05-14

## 2024-05-14 RX ORDER — HEPARIN SODIUM (PORCINE) LOCK FLUSH IV SOLN 100 UNIT/ML 100 UNIT/ML
5 SOLUTION INTRAVENOUS
OUTPATIENT
Start: 2025-05-14

## 2024-05-14 RX ORDER — ZOLEDRONIC ACID 5 MG/100ML
5 INJECTION, SOLUTION INTRAVENOUS ONCE
Status: COMPLETED | OUTPATIENT
Start: 2024-05-14 | End: 2024-05-14

## 2024-05-14 RX ORDER — ALBUTEROL SULFATE 90 UG/1
1-2 AEROSOL, METERED RESPIRATORY (INHALATION)
Start: 2025-05-14

## 2024-05-14 RX ORDER — EPINEPHRINE 1 MG/ML
0.3 INJECTION, SOLUTION INTRAMUSCULAR; SUBCUTANEOUS EVERY 5 MIN PRN
OUTPATIENT
Start: 2025-05-14

## 2024-05-14 RX ADMIN — ZOLEDRONIC ACID 5 MG: 5 INJECTION, SOLUTION INTRAVENOUS at 13:22

## 2024-05-14 RX ADMIN — SODIUM CHLORIDE 250 ML: 9 INJECTION, SOLUTION INTRAVENOUS at 13:48

## 2024-05-14 NOTE — PROGRESS NOTES
"Infusion Nursing Note:  Francisca Williamson presents today for Reclast.    Patient seen by provider today: No   present during visit today: Not Applicable.    Note: Francisca comes to infusion ambulatory and in stable condition. Confirms that she is here for a Reclast infusion. Has had the medication before and has tolerated well. A peripheral IV was placed in the right AC x 1 attempt. Reclast administered over 30 minutes and then the line was flushed with NS afterwards. The IV was then removed and the site was covered with 2x2 and secured in place with coban. Encouraged to drink extra fluids over the next few days. Left infusion ambulatory and in stable condition.     /57 (BP Location: Left arm, Patient Position: Sitting, Cuff Size: Adult Regular)   Pulse 59   Temp 97.6  F (36.4  C) (Oral)   Resp 16   Ht 1.645 m (5' 4.75\")   Wt 71.7 kg (158 lb)   SpO2 98%   BMI 26.50 kg/m      Intravenous Access:  Peripheral IV placed.    Treatment Conditions:  Lab Results   Component Value Date     04/18/2024    POTASSIUM 4.3 04/18/2024    MAG 2.2 06/21/2021    CR 0.86 04/18/2024    LOPEZ 9.4 04/18/2024    BILITOTAL 0.3 04/18/2024    ALBUMIN 4.4 04/18/2024    ALT 14 04/18/2024    AST 24 04/18/2024     Results reviewed, labs MET treatment parameters, ok to proceed with treatment.    Post Infusion Assessment:  Patient tolerated infusion without incident.  Site patent and intact, free from redness, edema or discomfort.  Access discontinued per protocol.     Discharge Plan:   Discharge instructions reviewed with: Patient.  Patient and/or family verbalized understanding of discharge instructions and all questions answered.  AVS to patient via [employer].   Patient discharged in stable condition accompanied by: self.  Departure Mode: Ambulatory.    Breonna Faustin RN    "

## 2024-05-26 ENCOUNTER — HEALTH MAINTENANCE LETTER (OUTPATIENT)
Age: 69
End: 2024-05-26

## 2024-06-07 DIAGNOSIS — E53.8 B12 DEFICIENCY: ICD-10-CM

## 2024-06-07 RX ORDER — CYANOCOBALAMIN 1000 UG/ML
INJECTION, SOLUTION INTRAMUSCULAR; SUBCUTANEOUS
Qty: 3 ML | Refills: 1 | Status: SHIPPED | OUTPATIENT
Start: 2024-06-07

## 2024-06-07 NOTE — TELEPHONE ENCOUNTER
Medication Question or Refill    What medication are you calling about (include dose and sig)?:   cyanocobalamin (CYANOCOBALAMIN) 1000 MCG/ML injection 3 mL 2 9/11/2023 -- No   Sig: INJECT 1 ML INTO THE SHOULDER, THIGH, OR BUTTOCKS, EVERY 30 DAYS   Sent to pharmacy as: Cyanocobalamin 1000 MCG/ML Injection Solution (CYANOCOBALAMIN)       Preferred Pharmacy:    [id number] - [place], [place] - [address] [place] [address] E  [address] [facility] [address]  Phone: [phone] Fax: [phone]    Controlled Substance Agreement on file:   CSA -- Patient Level:    CSA: None found at the patient level.       Who prescribed the medication?:     PCP    Do you need a refill? Yes    When did you use the medication last? 9/11/2023    Patient offered an appointment? No    Do you have any questions or concerns?  No      Could we send this information to you in [facility] or would you prefer to receive a phone call?:   Patient would prefer a phone call   Okay to leave a detailed message?: No at Cell number on file:    Telephone Information:   Mobile [phone]

## 2024-06-18 ENCOUNTER — IMMUNIZATION (OUTPATIENT)
Dept: FAMILY MEDICINE | Facility: CLINIC | Age: 69
End: 2024-06-18
Payer: COMMERCIAL

## 2024-06-18 DIAGNOSIS — Z23 ENCOUNTER FOR IMMUNIZATION: Primary | ICD-10-CM

## 2024-06-18 PROCEDURE — 90480 ADMN SARSCOV2 VAC 1/ONLY CMP: CPT

## 2024-06-18 PROCEDURE — 99207 PR NO CHARGE NURSE ONLY: CPT

## 2024-06-18 PROCEDURE — 91320 SARSCV2 VAC 30MCG TRS-SUC IM: CPT

## 2024-06-18 NOTE — PROGRESS NOTES
Prior to immunization administration, verified patients identity using patient s name and date of birth. Please see Immunization Activity for additional information.     Screening Questionnaire for Adult Immunization    Are you sick today?   No   Do you have allergies to medications, food, a vaccine component or latex?   Yes   Have you ever had a serious reaction after receiving a vaccination?   No   Do you have a long-term health problem with heart, lung, kidney, or metabolic disease (e.g., diabetes), asthma, a blood disorder, no spleen, complement component deficiency, a cochlear implant, or a spinal fluid leak?  Are you on long-term aspirin therapy?   No   Do you have cancer, leukemia, HIV/AIDS, or any other immune system problem?   No   Do you have a parent, brother, or sister with an immune system problem?   No   In the past 3 months, have you taken medications that affect  your immune system, such as prednisone, other steroids, or anticancer drugs; drugs for the treatment of rheumatoid arthritis, Crohn s disease, or psoriasis; or have you had radiation treatments?   No   Have you had a seizure, or a brain or other nervous system problem?   No   During the past year, have you received a transfusion of blood or blood    products, or been given immune (gamma) globulin or antiviral drug?   No   For women: Are you pregnant or is there a chance you could become       pregnant during the next month?   No   Have you received any vaccinations in the past 4 weeks?   No     Immunization questionnaire answers were all negative.    I have reviewed the following standing orders:   This patient is due and qualifies for the Covid-19 vaccine.     Click here for COVID-19 Standing Order    I have reviewed the vaccines inclusion and exclusion criteria; No concerns regarding eligibility.     Patient instructed to remain in clinic for 15 minutes afterwards, and to report any adverse reactions.     Screening performed by Jerrica REYES  KAVITHA Adams on 6/18/2024 at 10:11 AM.

## 2024-06-26 ENCOUNTER — TRANSFERRED RECORDS (OUTPATIENT)
Dept: HEALTH INFORMATION MANAGEMENT | Facility: CLINIC | Age: 69
End: 2024-06-26
Payer: COMMERCIAL

## 2024-08-30 ENCOUNTER — OFFICE VISIT (OUTPATIENT)
Dept: FAMILY MEDICINE | Facility: CLINIC | Age: 69
End: 2024-08-30
Payer: COMMERCIAL

## 2024-08-30 VITALS
BODY MASS INDEX: 27.12 KG/M2 | HEART RATE: 65 BPM | DIASTOLIC BLOOD PRESSURE: 67 MMHG | SYSTOLIC BLOOD PRESSURE: 119 MMHG | OXYGEN SATURATION: 97 % | RESPIRATION RATE: 16 BRPM | TEMPERATURE: 98.1 F | HEIGHT: 65 IN | WEIGHT: 162.8 LBS

## 2024-08-30 DIAGNOSIS — K58.1 IRRITABLE BOWEL SYNDROME WITH CONSTIPATION: Primary | ICD-10-CM

## 2024-08-30 PROCEDURE — 99213 OFFICE O/P EST LOW 20 MIN: CPT | Performed by: FAMILY MEDICINE

## 2024-08-30 RX ORDER — HYOSCYAMINE SULFATE 0.125 MG
TABLET ORAL
Qty: 90 TABLET | Refills: 3 | Status: SHIPPED | OUTPATIENT
Start: 2024-08-30

## 2024-08-30 ASSESSMENT — ENCOUNTER SYMPTOMS: ABDOMINAL PAIN: 1

## 2024-08-30 NOTE — PROGRESS NOTES
Assessment & Plan     1. Irritable bowel syndrome with constipation  - hyoscyamine (LEVSIN) 0.125 MG tablet; 0.125 to 0.25 mg every 4 to 8 hours or as needed; maximum: 1.5 mg/day.  Dispense: 90 tablet; Refill: 3      Longstanding intermittent abdominal cramping which is currently improving.  No bowel changes, no fevers, abdominal exam is benign, I do not suspect recurrent diverticulitis.  Will hold off on any labs at this time.  Continue bland diet.  Will prescribe Levsin which has been prescribed for the symptoms in the past but not previously used.  If symptoms worsening, we can reevaluate, consider labs and imaging.    She also is concerned about possibility of recurrent herpes, vulvar exam today is normal though she is already started Valtrex.  Complete course of Valtrex.  If symptoms not resolving, reach out for reevaluation.  If symptoms resolve though recur again, consider prophylactic dosing.    Ken Espinosa is a 69 year old, presenting for the following health issues:  Abdominal Pain (IBS-C and has hx of abdominal pain, Last episode of abdominal pain started on 8/20. ) and Vaginal Problem (Vaginal discomfort possible herpes has a history of herpes labialis )        8/30/2024     1:46 PM   Additional Questions   Roomed by Tayler MILLER CMA     History of Present Illness       Reason for visit:  Abdominal pain  Symptom onset:  1-2 weeks ago  Symptoms include:  Very uncomfortable abdominal pain  Symptom intensity:  Moderate  Symptom progression:  Improving  Had these symptoms before:  Yes  Has tried/received treatment for these symptoms:  Yes  Previous treatment was successful:  Yes  Prior treatment description:  Stopped eating dairy with lactose; was given a medication for spasms  What makes it worse:  Sitting  What makes it better:  Standing, walking   She is taking medications regularly.       ABDOMEN: experiencing symptoms of colon spasms across upper abdomen and along her left colon. Hx IBS-C, thought  "related to dairy. Also has history of diverticulosis, sounds like worsening based on surveillance colonoscopies. Episode of diverticulitis in 2015. Started stool softener to manage constipation. Reports a variety of experiences over the years with colon spasm symptoms. States has checked many times for diverticulitis and negative by CT scans.     Was [facility] with [facility]. Hasn't seen now for 3 years.  Recommended lactose free diet, has made a difference. Since then, experiences periods of spasms though much more tolerable.     Using Levsin PRN. Hasn't really used this. Continues to have spasms intermittently, not bad enough to use. Hasn't refilled the medication. Requests to fill to have on hand.     Most recently, had intense spasming 8/20, thought sudden, sharp pain upper to lower left quadrant. Routine spasming since, seems to be improving. Internist is currently on leave.     Last 2-3 days, experiencing less pain, improving.   Doing gluten and diary free.     No fevers or chills. No blood in stool. Slight nausea, not unusual. Stooling regularly. A few times pain with BM, typically not, usually more pressure or urgency.        GENITOURINARY CONCERNS: remote hx herpes. No recent outbreaks in years. Thought maybe an outbreak 3 weeks ago.  Has prescription for valtrex and has refills available.  Recurrent symptoms 2 days ago, is already started Valtrex.        Review of Systems  See HPI above.         Objective    /67 (BP Location: Left arm, Patient Position: Sitting, Cuff Size: Adult Regular)   Pulse 65   Temp 98.1  F (36.7  C) (Oral)   Resp 16   Ht 1.645 m (5' 4.75\")   Wt 73.8 kg (162 lb 12.8 oz)   SpO2 97%   BMI 27.30 kg/m    Body mass index is 27.3 kg/m .  Physical Exam     GENERAL: alert and no distress  RESP: lungs clear to auscultation - no rales, rhonchi or wheezes  CV: regular rate and rhythm, normal S1 S2, no S3 or S4, no murmur, click or rub, no peripheral edema  ABDOMEN: soft, nontender, no " hepatosplenomegaly, no masses and bowel sounds normal  MS: no gross musculoskeletal defects noted, no edema            Signed Electronically by: Gina Zabala DO

## 2024-09-16 ENCOUNTER — LAB (OUTPATIENT)
Dept: LAB | Facility: CLINIC | Age: 69
End: 2024-09-16
Payer: COMMERCIAL

## 2024-09-16 DIAGNOSIS — R31.29 MICROSCOPIC HEMATURIA: ICD-10-CM

## 2024-09-16 PROCEDURE — 81015 MICROSCOPIC EXAM OF URINE: CPT

## 2024-09-17 LAB
RBC URINE: 1 /HPF
SQUAMOUS EPITHELIAL: <1 /HPF
WBC URINE: 0 /HPF

## 2024-09-18 ENCOUNTER — VIRTUAL VISIT (OUTPATIENT)
Dept: UROLOGY | Facility: CLINIC | Age: 69
End: 2024-09-18
Payer: COMMERCIAL

## 2024-09-18 DIAGNOSIS — R82.90 ABNORMAL URINALYSIS: Primary | ICD-10-CM

## 2024-09-18 PROCEDURE — 99214 OFFICE O/P EST MOD 30 MIN: CPT | Mod: 95 | Performed by: PHYSICIAN ASSISTANT

## 2024-09-18 ASSESSMENT — PAIN SCALES - GENERAL: PAINLEVEL: NO PAIN (0)

## 2024-09-18 NOTE — NURSING NOTE
Current patient location: [facility] [address]  [facility] [address]    Is the patient currently in the state of [place]? YES    Visit mode:VIDEO    If the visit is dropped, the patient can be reconnected by: VIDEO VISIT: Text to cell phone:   Telephone Information:   Mobile [phone]       Will anyone else be joining the visit? NO  (If patient encounters technical issues they should call [phone]    How would you like to obtain your AVS? MyChart    Are changes needed to the allergy or medication list? No    Are refills needed on medications prescribed by this physician? NO    Rooming Documentation:  Not applicable      Reason for visit: RECHECK (6 month follow-up )    Nereyda KULKARNI

## 2024-09-18 NOTE — LETTER
9/18/2024       RE: Francisca Willimason  [address]  [facility] [address]     Dear Colleague,    Thank you for referring your patient, Francisca Williamson, to the [facility] AGA at [facility]. Please see a copy of my visit note below.    Virtual Visit Details    Originating Location (pt. Location): Home    Distant Location (provider location):  On-site  Platform used for Video Visit: Ac  Start: 12:02pm  End: 12:15pm    CC: abnormal UA    HPI:  Francisca Williamson is a pleasant 69 year old female who presents in follow-up of the above.     Seen last year with abnormal UAs and normal microscpy--as outlined prior: UA with small blood (no micro exam). UC mixed. Repeat UA with PCP in Aug 2023 and UA with micro normal. Returned in Nov for UA with Dr. Westfall and UA with small blood (no micro exam) and UC again neg. Increased her topical estrogen. Can have symptoms intermittently. UA last year with trace blood.     No gross hematuria.     3/18/24:  -UA 2-5 RBCs/HPF 1/16/24  -No gross hematuria    TODAY 9/18/24  Recheck today, has been hopeful to avoid intervention.   Recheck UA neg for micro hematuria.  No gross hematuria.         Past Medical History:   Diagnosis Date     B12 deficiency      Breast pain, left 02/21/2023     Celiac disease      Chronic idiopathic constipation 11/08/2021     Diverticulitis      Diverticulosis      GERD (gastroesophageal reflux disease)     states main problem is heartburn     High cholesterol      Mumps      Osteoporosis      Shingles 01/01/2005     Spider veins      STD (sexually transmitted disease)        Past Surgical History:   Procedure Laterality Date     APPENDECTOMY      age 13     DILATION AND CURETTAGE, DIAGNOSTIC / THERAPEUTIC      age 38, for endometriosis       Social History     Socioeconomic History     Marital status:      Spouse name: Not on file     Number of children: Not on file     Years of  education: Not on file     Highest education level: Not on file   Occupational History     Not on file   Tobacco Use     Smoking status: Never     Passive exposure: Never     Smokeless tobacco: Never   Vaping Use     Vaping status: Never Used   Substance and Sexual Activity     Alcohol use: Yes     Alcohol/week: 7.0 standard drinks of alcohol     Drug use: No     Sexual activity: Yes     Partners: Male     Birth control/protection: None   Other Topics Concern     Parent/sibling w/ CABG, MI or angioplasty before 65F 55M? No   Social History Narrative     27 years.   Has 2 children from 's first marriage. Has 3 grandchildren.   She has not had any pregnancy.  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 Has 4th grandchild on the way    12/13/21    [facility]       Social Determinants of Health     Financial Resource Strain: Low Risk  (1/10/2024)    Financial Resource Strain      Within the past 12 months, have you or your family members you live with been unable to get utilities (heat, electricity) when it was really needed?: No   Food Insecurity: Low Risk  (1/10/2024)    Food Insecurity      Within the past 12 months, did you worry that your food would run out before you got money to buy more?: No      Within the past 12 months, did the food you bought just not last and you didn t have money to get more?: No   Transportation Needs: Low Risk  (1/10/2024)    Transportation Needs      Within the past 12 months, has lack of transportation kept you from medical appointments, getting your medicines, non-medical meetings or appointments, work, or from getting things that you need?: No   Physical Activity: Not on file   Stress: Not on file   Social Connections: Unknown (1/1/2022)    Received from [facility], [facility]    Social Connections      Frequency of Communication with  Friends and Family: Not on file   Interpersonal Safety: Low Risk  (8/30/2024)    Interpersonal Safety      Do you feel physically and emotionally safe where you currently live?: Yes      Within the past 12 months, have you been hit, slapped, kicked or otherwise physically hurt by someone?: No      Within the past 12 months, have you been humiliated or emotionally abused in other ways by your partner or ex-partner?: No   Housing Stability: Low Risk  (1/10/2024)    Housing Stability      Do you have housing? : Yes      Are you worried about losing your housing?: No       Family History   Problem Relation Age of Onset     Osteoporosis Mother      Cerebrovascular Disease Mother 74     Hyperlipidemia Father         also brother and sister     Dementia Father      Breast Cancer Maternal Grandmother      Colon Cancer Maternal Grandfather      Coronary Artery Disease Brother         also paternal grandfather       Allergies   Allergen Reactions     Doxycycline GI Disturbance     Abdominal ileus     Alendronate GI Disturbance     Other reaction(s): GI Upset       Clindamycin      Rash, see note from allergy 5/3/21     Gluten Meal GI Disturbance     Celiac disease  Celiac disease       Milk (Cow)      Went Dairy Free in April related to GI issues     Neomycin-Polymyxin-Gramicidin Itching     Risedronate GI Disturbance     Cephalosporins Rash     Ibuprofen      Nitrofuran Derivatives Other (See Comments) and Rash     Overwhelming nausea    Overwhelming nausea       Current Outpatient Medications   Medication Sig Dispense Refill     cyanocobalamin (CYANOCOBALAMIN) 1000 MCG/ML injection INJECT 1 ML INTO THE SHOULDER, THIGH, OR BUTTOCKS, EVERY 30 DAYS 3 mL 1     cyclobenzaprine (FLEXERIL) 5 MG tablet Take 1 tablet (5 mg) by mouth 3 times daily as needed for muscle spasms 30 tablet 1     diltiazem 2% in PLO gel Apply pea sized amount 2 times daily as needed 30 g 0     estradiol (ESTRACE) 0.1 MG/GM vaginal cream Place vaginally  "three times a week 42.5 g 4     hyoscyamine (LEVSIN) 0.125 MG tablet 0.125 to 0.25 mg every 4 to 8 hours or as needed; maximum: 1.5 mg/day. 90 tablet 3     Lactobacillus Rhamnosus, GG, ( PROBIOTIC DIGESTIVE CARE) CAPS Take 1 capsule by mouth       latanoprost (XALATAN) 0.005 % ophthalmic solution INT 1 GTT IN EACH EYE ONCE D       METAMUCIL FIBER PO        metroNIDAZOLE (METROCREAM) 0.75 % external cream Apply topically 2 times daily 45 g 1     polyethylene glycol (MIRALAX) 17 GM/Dose powder Take 17 g by mouth       pravastatin (PRAVACHOL) 40 MG tablet Take 1 tablet (40 mg) by mouth daily 90 tablet 3     timolol maleate (TIMOPTIC) 0.5 % ophthalmic solution Place 1 drop into both eyes every morning       Tuberculin-Allergy Syringes (Ridemakerz SYRINGE SLIP TIP) 25G X 5/8\" 1 ML MISC FOR HOME  each 0     valACYclovir (VALTREX) 500 MG tablet TAKE 1 TABLET(500 MG) BY MOUTH TWICE DAILY FOR 3 DAYS 6 tablet 3     VITAMIN D PO Take 3,000 Units by mouth       No current facility-administered medications for this visit.         PEx:   not currently breastfeeding.    PSYCH: NAD  EYES: EOMI  MOUTH: MMM  NEURO: AAO x3    Urine: 0-2 RBCs/HPF    A/P: Francisca Williamson is a 69 year old female with abnormal UAs.    -UAs and micro exams reviewed with the patient. She had one UA with 2-5 RBCs/HPF. We discussed CT urogram, cytology, cysto.  We agreed to recheck and now UA with micro x 2 normal.   -Continue with topical estrogen 3 x per week.   -Annual UA with micro at physical. Notify me if >2 RBCs/HPF or gross hematuria.    Shanell Booker PA-C  [facility]        20 minutes spent on the date of the encounter doing chart review, review of outside records, review of test results, interpretation of tests, patient visit and documentation                 Again, thank you for allowing me to participate in the care of your patient.      Sincerely,    Shanell Booker PA-C, [facility]    "

## 2024-09-18 NOTE — PROGRESS NOTES
Virtual Visit Details    Originating Location (pt. Location): Home    Distant Location (provider location):  On-site  Platform used for Video Visit: Ac  Start: 12:02pm  End: 12:15pm    CC: abnormal UA    HPI:  Francisca Williamson is a pleasant 69 year old female who presents in follow-up of the above.     Seen last year with abnormal UAs and normal microscpy--as outlined prior: UA with small blood (no micro exam). UC mixed. Repeat UA with PCP in Aug 2023 and UA with micro normal. Returned in Nov for UA with Dr. Westfall and UA with small blood (no micro exam) and UC again neg. Increased her topical estrogen. Can have symptoms intermittently. UA last year with trace blood.     No gross hematuria.     3/18/24:  -UA 2-5 RBCs/HPF 1/16/24  -No gross hematuria    TODAY 9/18/24  Recheck today, has been hopeful to avoid intervention.   Recheck UA neg for micro hematuria.  No gross hematuria.         Past Medical History:   Diagnosis Date    B12 deficiency     Breast pain, left 02/21/2023    Celiac disease     Chronic idiopathic constipation 11/08/2021    Diverticulitis     Diverticulosis     GERD (gastroesophageal reflux disease)     states main problem is heartburn    High cholesterol     Mumps     Osteoporosis     Shingles 01/01/2005    Spider veins     STD (sexually transmitted disease)        Past Surgical History:   Procedure Laterality Date    APPENDECTOMY      age 13    DILATION AND CURETTAGE, DIAGNOSTIC / THERAPEUTIC      age 38, for endometriosis       Social History     Socioeconomic History    Marital status:      Spouse name: Not on file    Number of children: Not on file    Years of education: Not on file    Highest education level: Not on file   Occupational History    Not on file   Tobacco Use    Smoking status: Never     Passive exposure: Never    Smokeless tobacco: Never   Vaping Use    Vaping status: Never Used   Substance and Sexual Activity    Alcohol use: Yes     Alcohol/week: 7.0 standard drinks  of alcohol    Drug use: No    Sexual activity: Yes     Partners: Male     Birth control/protection: None   Other Topics Concern    Parent/sibling w/ CABG, MI or angioplasty before 65F 55M? No   Social History Narrative     27 years.   Has 2 children from 's first marriage. Has 3 grandchildren.   She has not had any pregnancy.  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.    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 Has 4th grandchild on the way    12/13/21    [facility]       Social Determinants of Health     Financial Resource Strain: Low Risk  (1/10/2024)    Financial Resource Strain     Within the past 12 months, have you or your family members you live with been unable to get utilities (heat, electricity) when it was really needed?: No   Food Insecurity: Low Risk  (1/10/2024)    Food Insecurity     Within the past 12 months, did you worry that your food would run out before you got money to buy more?: No     Within the past 12 months, did the food you bought just not last and you didn t have money to get more?: No   Transportation Needs: Low Risk  (1/10/2024)    Transportation Needs     Within the past 12 months, has lack of transportation kept you from medical appointments, getting your medicines, non-medical meetings or appointments, work, or from getting things that you need?: No   Physical Activity: Not on file   Stress: Not on file   Social Connections: Unknown (1/1/2022)    Received from [facility], [facility]    Social Connections     Frequency of Communication with Friends and Family: Not on file   Interpersonal Safety: Low Risk  (8/30/2024)    Interpersonal Safety     Do you feel physically and emotionally safe where you currently live?: Yes     Within the past 12 months, have you been hit, slapped, kicked or otherwise physically hurt by someone?: No     Within the past 12 months, have you been  humiliated or emotionally abused in other ways by your partner or ex-partner?: No   Housing Stability: Low Risk  (1/10/2024)    Housing Stability     Do you have housing? : Yes     Are you worried about losing your housing?: No       Family History   Problem Relation Age of Onset    Osteoporosis Mother     Cerebrovascular Disease Mother 74    Hyperlipidemia Father         also brother and sister    Dementia Father     Breast Cancer Maternal Grandmother     Colon Cancer Maternal Grandfather     Coronary Artery Disease Brother         also paternal grandfather       Allergies   Allergen Reactions    Doxycycline GI Disturbance     Abdominal ileus    Alendronate GI Disturbance     Other reaction(s): GI Upset      Clindamycin      Rash, see note from allergy 5/3/21    Gluten Meal GI Disturbance     Celiac disease  Celiac disease      Milk (Cow)      Went Dairy Free in April related to GI issues    Neomycin-Polymyxin-Gramicidin Itching    Risedronate GI Disturbance    Cephalosporins Rash    Ibuprofen     Nitrofuran Derivatives Other (See Comments) and Rash     Overwhelming nausea    Overwhelming nausea       Current Outpatient Medications   Medication Sig Dispense Refill    cyanocobalamin (CYANOCOBALAMIN) 1000 MCG/ML injection INJECT 1 ML INTO THE SHOULDER, THIGH, OR BUTTOCKS, EVERY 30 DAYS 3 mL 1    cyclobenzaprine (FLEXERIL) 5 MG tablet Take 1 tablet (5 mg) by mouth 3 times daily as needed for muscle spasms 30 tablet 1    diltiazem 2% in PLO gel Apply pea sized amount 2 times daily as needed 30 g 0    estradiol (ESTRACE) 0.1 MG/GM vaginal cream Place vaginally three times a week 42.5 g 4    hyoscyamine (LEVSIN) 0.125 MG tablet 0.125 to 0.25 mg every 4 to 8 hours or as needed; maximum: 1.5 mg/day. 90 tablet 3    Lactobacillus Rhamnosus, GG, ( PROBIOTIC DIGESTIVE CARE) CAPS Take 1 capsule by mouth      latanoprost (XALATAN) 0.005 % ophthalmic solution INT 1 GTT IN EACH EYE ONCE D      METAMUCIL FIBER PO        "metroNIDAZOLE (METROCREAM) 0.75 % external cream Apply topically 2 times daily 45 g 1    polyethylene glycol (MIRALAX) 17 GM/Dose powder Take 17 g by mouth      pravastatin (PRAVACHOL) 40 MG tablet Take 1 tablet (40 mg) by mouth daily 90 tablet 3    timolol maleate (TIMOPTIC) 0.5 % ophthalmic solution Place 1 drop into both eyes every morning      Tuberculin-Allergy Syringes (BD SYRINGE SLIP TIP) 25G X 5/8\" 1 ML MISC FOR HOME  each 0    valACYclovir (VALTREX) 500 MG tablet TAKE 1 TABLET(500 MG) BY MOUTH TWICE DAILY FOR 3 DAYS 6 tablet 3    VITAMIN D PO Take 3,000 Units by mouth       No current facility-administered medications for this visit.         PEx:   not currently breastfeeding.    PSYCH: NAD  EYES: EOMI  MOUTH: MMM  NEURO: AAO x3    Urine: 0-2 RBCs/HPF    A/P: Francisca Willimason is a 69 year old female with abnormal UAs.    -UAs and micro exams reviewed with the patient. She had one UA with 2-5 RBCs/HPF. We discussed CT urogram, cytology, cysto.  We agreed to recheck and now UA with micro x 2 normal.   -Continue with topical estrogen 3 x per week.   -Annual UA with micro at physical. Notify me if >2 RBCs/HPF or gross hematuria.    Shanell Booker PA-C  [facility]        20 minutes spent on the date of the encounter doing chart review, review of outside records, review of test results, interpretation of tests, patient visit and documentation               "

## 2024-10-13 ENCOUNTER — HEALTH MAINTENANCE LETTER (OUTPATIENT)
Age: 69
End: 2024-10-13

## 2024-11-07 ENCOUNTER — IMMUNIZATION (OUTPATIENT)
Dept: FAMILY MEDICINE | Facility: CLINIC | Age: 69
End: 2024-11-07
Payer: COMMERCIAL

## 2024-11-07 PROCEDURE — G0008 ADMIN INFLUENZA VIRUS VAC: HCPCS

## 2024-11-07 PROCEDURE — 90656 IIV3 VACC NO PRSV 0.5 ML IM: CPT

## 2024-11-12 ENCOUNTER — TRANSFERRED RECORDS (OUTPATIENT)
Dept: MULTI SPECIALTY CLINIC | Facility: CLINIC | Age: 69
End: 2024-11-12

## 2024-11-12 LAB — RETINOPATHY: NORMAL

## 2024-12-04 ENCOUNTER — TELEPHONE (OUTPATIENT)
Dept: INTERNAL MEDICINE | Facility: CLINIC | Age: 69
End: 2024-12-04
Payer: COMMERCIAL

## 2024-12-04 DIAGNOSIS — E53.8 B12 DEFICIENCY: ICD-10-CM

## 2024-12-04 RX ORDER — CYANOCOBALAMIN 1000 UG/ML
INJECTION, SOLUTION INTRAMUSCULAR; SUBCUTANEOUS
Qty: 3 ML | Refills: 1 | Status: SHIPPED | OUTPATIENT
Start: 2024-12-04

## 2024-12-04 NOTE — TELEPHONE ENCOUNTER
Reason for call:  Other   Patient called regarding (reason for call): call back  Additional comments: patient needs refilled her  cyanocobalamin (CYANOCOBALAMIN) 1000 MCG/ML injection   3 bottles for the year  Use [employer] [facility]    Phone number to reach patient:  Home number on file [phone] (home)    Best Time:  any    Can we leave a detailed message on this number?  YES    Travel screening: Not Applicable

## 2024-12-10 ENCOUNTER — IMMUNIZATION (OUTPATIENT)
Dept: FAMILY MEDICINE | Facility: CLINIC | Age: 69
End: 2024-12-10
Payer: COMMERCIAL

## 2024-12-10 PROCEDURE — 91320 SARSCV2 VAC 30MCG TRS-SUC IM: CPT

## 2024-12-10 PROCEDURE — 90480 ADMN SARSCOV2 VAC 1/ONLY CMP: CPT

## 2025-01-25 ENCOUNTER — HEALTH MAINTENANCE LETTER (OUTPATIENT)
Age: 70
End: 2025-01-25

## 2025-01-28 ENCOUNTER — ANCILLARY PROCEDURE (OUTPATIENT)
Dept: GENERAL RADIOLOGY | Facility: CLINIC | Age: 70
End: 2025-01-28
Attending: FAMILY MEDICINE
Payer: COMMERCIAL

## 2025-01-28 ENCOUNTER — OFFICE VISIT (OUTPATIENT)
Dept: FAMILY MEDICINE | Facility: CLINIC | Age: 70
End: 2025-01-28
Payer: COMMERCIAL

## 2025-01-28 VITALS
TEMPERATURE: 97.8 F | HEIGHT: 65 IN | DIASTOLIC BLOOD PRESSURE: 50 MMHG | BODY MASS INDEX: 27.96 KG/M2 | RESPIRATION RATE: 20 BRPM | OXYGEN SATURATION: 98 % | SYSTOLIC BLOOD PRESSURE: 114 MMHG | WEIGHT: 167.8 LBS | HEART RATE: 68 BPM

## 2025-01-28 DIAGNOSIS — R10.31 RIGHT GROIN PAIN: Primary | ICD-10-CM

## 2025-01-28 DIAGNOSIS — R10.31 RIGHT GROIN PAIN: ICD-10-CM

## 2025-01-28 PROCEDURE — 99213 OFFICE O/P EST LOW 20 MIN: CPT | Performed by: FAMILY MEDICINE

## 2025-01-28 PROCEDURE — 73502 X-RAY EXAM HIP UNI 2-3 VIEWS: CPT | Mod: TC | Performed by: STUDENT IN AN ORGANIZED HEALTH CARE EDUCATION/TRAINING PROGRAM

## 2025-01-28 NOTE — PROGRESS NOTES
"  Assessment & Plan     Right groin pain  Suspicion is primarily for osteoarthritis of the hip given exam and history.  X-ray ordered today, pending at time of note.  If this shows a pristine joint, would pursue physical therapy for possibly flexor tendinopathy.  If this shows osteoarthritis, would recommend a discussion with orthopedics, possibly injection.  - XR Hip Right 2-3 Views; Future          BMI  Estimated body mass index is 28.14 kg/m  as calculated from the following:    Height as of this encounter: 1.645 m (5' 4.75\").    Weight as of this encounter: 76.1 kg (167 lb 12.8 oz).             Ken Espinosa is a 69 year old, presenting for the following health issues:  Pelvic Pain (Intermittent pain in right pelvic/groin, sometimes pain radiates down into right knee/leg x 6 months)        1/28/2025     1:59 PM   Additional Questions   Roomed by Lauren BOLTON LPN     Via the Health Maintenance questionnaire, the patient has reported the following services have been completed -Eye Exam: [facility] 2024-11-12, this information has been sent to the abstraction team.  History of Present Illness       Reason for visit:  Right groin/pelvic area discomfort, sometimes right hip area; this discomfort began in June 2024, sometimes becomes very uncomfortable with movement, notice the discomfort more often after my daily walks  Symptom onset:  More than a month  Symptoms include:  Discomfort/pain in right groin/pelvic area, sometimes also right hip area. Discomfort comes/goes, have had two episodes in the last couple months where the discomfort is worse and take Tylenol to relieve  Symptom intensity:  Moderate  Symptom progression:  Worsening  Had these symptoms before:  No  What makes it worse:  Sometimes after a daily walk there is more discomfort after the walk  What makes it better:  Take Tylenol if needed   She is taking medications regularly.     Patient presents today for evaluation of right-sided groin pain.  " "This seemed to develop over the summer when she was working on a deck with her daughter.  She placed some heat on the area and gradually the pain dissipated.  This seems to come and go, particularly with activities like walking.  Occasionally she will have some sharp pain in the groin, but mostly an ache again after activity.  She enjoys daily walks, but has been limited related to pain in the right groin.  There is no pain on the left.  Occasionally the pain will radiate into the thigh, even sometimes down to the knee.  Over the past month, the pain seems to have been intensifying.       Review of Systems  Constitutional, HEENT, cardiovascular, pulmonary, gi and gu systems are negative, except as otherwise noted.      Objective    /50   Pulse 68   Temp 97.8  F (36.6  C) (Oral)   Resp 20   Ht 1.645 m (5' 4.75\")   Wt 76.1 kg (167 lb 12.8 oz)   SpO2 98%   BMI 28.14 kg/m    Body mass index is 28.14 kg/m .  Physical Exam   GENERAL: alert and no distress  RESP: Breathing comfortably, no cough during the visit  MS: Mild pain with full flexion of the right hip, mild pain with internal rotation, less pain with external rotation.  No pain with full range of motion of the left hip.  NEURO: Normal strength and tone, mentation intact and speech normal  PSYCH: mentation appears normal, affect normal/bright    Diagnostics: X-ray right hip pending at time of note        Signed Electronically by: Elida Salgado MD    "

## 2025-01-28 NOTE — PATIENT INSTRUCTIONS
Can try turmeric as a natural anti-inflammatory.    Can also try glucosamine and chondroitin.  If this is not helpful after about 1 month, it is unlikely to be helpful in future.

## 2025-03-06 DIAGNOSIS — M62.89 PELVIC FLOOR DYSFUNCTION IN FEMALE: ICD-10-CM

## 2025-03-06 RX ORDER — ESTRADIOL 0.1 MG/G
CREAM VAGINAL
Qty: 42.5 G | Refills: 4 | Status: SHIPPED | OUTPATIENT
Start: 2025-03-07

## 2025-04-02 ENCOUNTER — ANCILLARY PROCEDURE (OUTPATIENT)
Dept: MAMMOGRAPHY | Facility: CLINIC | Age: 70
End: 2025-04-02
Attending: INTERNAL MEDICINE
Payer: COMMERCIAL

## 2025-04-02 DIAGNOSIS — Z12.31 VISIT FOR SCREENING MAMMOGRAM: ICD-10-CM

## 2025-04-02 PROCEDURE — 77063 BREAST TOMOSYNTHESIS BI: CPT

## 2025-04-02 PROCEDURE — 77067 SCR MAMMO BI INCL CAD: CPT

## 2025-04-04 DIAGNOSIS — E78.5 HYPERLIPIDEMIA, UNSPECIFIED HYPERLIPIDEMIA TYPE: ICD-10-CM

## 2025-04-04 NOTE — TELEPHONE ENCOUNTER
Reason for Call:  Medication or medication refill: Urgent Refill     Do you use a [facility]? Brook of the pharmacy and phone number for the current request:      [facility][id number] [address] AT [facility] [address]       Name of the medication requested: pravastatin (PRAVACHOL) 40 MG tablet     Other request: Patient ran out of medication last weekend.  States she requested refill on Monday from [facility].     Patient is scheduled for AWV with Dr. Melva Melchor on 04/25/25    Call taken on 4/4/2025 at 4:00 PM by Reva Renae

## 2025-04-07 RX ORDER — PRAVASTATIN SODIUM 40 MG
40 TABLET ORAL DAILY
Qty: 90 TABLET | Refills: 3 | Status: SHIPPED | OUTPATIENT
Start: 2025-04-07

## 2025-04-24 ENCOUNTER — LAB (OUTPATIENT)
Dept: LAB | Facility: CLINIC | Age: 70
End: 2025-04-24
Payer: COMMERCIAL

## 2025-04-24 DIAGNOSIS — K90.0 CELIAC DISEASE: ICD-10-CM

## 2025-04-24 DIAGNOSIS — M81.0 OSTEOPOROSIS, UNSPECIFIED OSTEOPOROSIS TYPE, UNSPECIFIED PATHOLOGICAL FRACTURE PRESENCE: ICD-10-CM

## 2025-04-25 PROBLEM — Z86.19 H/O COLD SORES: Status: ACTIVE | Noted: 2025-04-25

## 2025-04-25 PROBLEM — Z00.00 ENCOUNTER FOR MEDICARE ANNUAL WELLNESS EXAM: Status: ACTIVE | Noted: 2025-04-25

## 2025-04-25 PROBLEM — M67.449 DIGITAL MUCOUS CYST: Status: ACTIVE | Noted: 2025-04-25

## 2025-04-25 PROBLEM — B02.29 POST HERPETIC NEURALGIA: Status: RESOLVED | Noted: 2023-02-21 | Resolved: 2025-04-25

## 2025-04-25 PROBLEM — M94.0 COSTOCHONDRITIS: Status: RESOLVED | Noted: 2024-01-16 | Resolved: 2025-04-25

## 2025-04-25 PROBLEM — M16.11 PRIMARY OSTEOARTHRITIS OF RIGHT HIP: Status: ACTIVE | Noted: 2025-04-25

## 2025-04-25 PROBLEM — M72.2 PLANTAR FASCIITIS OF RIGHT FOOT: Status: ACTIVE | Noted: 2025-04-25

## 2025-04-25 PROBLEM — R07.89 CHEST WALL PAIN: Status: RESOLVED | Noted: 2023-06-23 | Resolved: 2025-04-25

## 2025-04-28 ENCOUNTER — PATIENT OUTREACH (OUTPATIENT)
Dept: CARE COORDINATION | Facility: CLINIC | Age: 70
End: 2025-04-28
Payer: COMMERCIAL

## 2025-04-30 ENCOUNTER — PATIENT OUTREACH (OUTPATIENT)
Dept: CARE COORDINATION | Facility: CLINIC | Age: 70
End: 2025-04-30

## 2025-04-30 ENCOUNTER — OFFICE VISIT (OUTPATIENT)
Dept: ENDOCRINOLOGY | Facility: CLINIC | Age: 70
End: 2025-04-30
Payer: COMMERCIAL

## 2025-04-30 VITALS
HEIGHT: 64 IN | BODY MASS INDEX: 27.91 KG/M2 | HEART RATE: 56 BPM | SYSTOLIC BLOOD PRESSURE: 120 MMHG | WEIGHT: 163.5 LBS | DIASTOLIC BLOOD PRESSURE: 74 MMHG

## 2025-04-30 DIAGNOSIS — M81.0 OSTEOPOROSIS, UNSPECIFIED OSTEOPOROSIS TYPE, UNSPECIFIED PATHOLOGICAL FRACTURE PRESENCE: Primary | ICD-10-CM

## 2025-04-30 DIAGNOSIS — K90.0 CELIAC DISEASE: ICD-10-CM

## 2025-04-30 PROCEDURE — 3078F DIAST BP <80 MM HG: CPT | Performed by: INTERNAL MEDICINE

## 2025-04-30 PROCEDURE — 99214 OFFICE O/P EST MOD 30 MIN: CPT | Performed by: INTERNAL MEDICINE

## 2025-04-30 PROCEDURE — 3074F SYST BP LT 130 MM HG: CPT | Performed by: INTERNAL MEDICINE

## 2025-04-30 RX ORDER — EPINEPHRINE 1 MG/ML
0.3 INJECTION, SOLUTION, CONCENTRATE INTRAVENOUS EVERY 5 MIN PRN
OUTPATIENT
Start: 2025-05-07

## 2025-04-30 RX ORDER — DIPHENHYDRAMINE HYDROCHLORIDE 50 MG/ML
25 INJECTION, SOLUTION INTRAMUSCULAR; INTRAVENOUS
Start: 2025-05-07

## 2025-04-30 RX ORDER — ALBUTEROL SULFATE 90 UG/1
1-2 INHALANT RESPIRATORY (INHALATION)
Start: 2025-05-07

## 2025-04-30 RX ORDER — HEPARIN SODIUM (PORCINE) LOCK FLUSH IV SOLN 100 UNIT/ML 100 UNIT/ML
5 SOLUTION INTRAVENOUS
OUTPATIENT
Start: 2025-05-07

## 2025-04-30 RX ORDER — ALBUTEROL SULFATE 0.83 MG/ML
2.5 SOLUTION RESPIRATORY (INHALATION)
OUTPATIENT
Start: 2025-05-07

## 2025-04-30 RX ORDER — HEPARIN SODIUM,PORCINE 10 UNIT/ML
5-20 VIAL (ML) INTRAVENOUS DAILY PRN
OUTPATIENT
Start: 2025-05-07

## 2025-04-30 RX ORDER — ZOLEDRONIC ACID 0.05 MG/ML
5 INJECTION, SOLUTION INTRAVENOUS ONCE
Start: 2025-05-07

## 2025-04-30 RX ORDER — DIPHENHYDRAMINE HYDROCHLORIDE 50 MG/ML
50 INJECTION, SOLUTION INTRAMUSCULAR; INTRAVENOUS
Start: 2025-05-07

## 2025-04-30 NOTE — LETTER
4/30/2025      Francisca Williamson  [address] [place] [address]      Dear Colleague,    Thank you for referring your patient, Francisca Williamson, to the [facility]. Please see a copy of my visit note below.      ENDOCRINOLOGY FOLLOW-UP      HISTORY OF PRESENT ILLNESS    Francisca Williamson is seen in follow-up.    Received second in this cycle of Reclast infusion on 5/14/2024 (received first dose of Reclast on 4/25/2023).    Around 6 months ago, she developed right hip pain (primarily in the groin) after helping her daughter with building a deck approximately 6 months ago; the pain improved but recurred in 1/2025.  Hip x-ray showed degenerative changes.  She is anticipating physical therapy.  Pain does not radiate down the thigh.  It waxes and wanes and has not been bothersome for the past few days.    Had follow-up DXA scan performed on 4/16/2025.  I reviewed images.  Bone mineral density is now in the range of low bone density.  -L1-L4 T-score -1.9, 11.4% increase in BMD compared to 2023 (significant)  -Left femoral neck T-score -1.1, left total hip T-score -1.3  -Right femoral neck T-score -1.2, right total hip T-score -1.4.  1.7% increase in mean hip BMD compared to 2023 (significant).    On gluten-free and dairy free diet.    Maintaining 1200 mg of calcium intake per day at a minimum: Typically has a serving of calcium fortified orange juice as well as 2 servings of almond milk each day in addition to her usual diet which is rich in greens.    On vitamin D3 3000 IU daily.    Pertinent endocrine and related history:  1.  Osteoporosis.  Previously diagnosed with osteoporosis and was initially treated with Fosamax which resulted in heartburn.  Therefore, she was transitioned to Reclast.  Some progress notes indicate she was treated with 3 doses of Reclast.  However, the patient recalls she may have received 2 doses.  Records in epic also indicate that she received a dose in 2014 and 2015: I do not  see documentation for dose in 2016.  She tolerated Reclast without side effect.  -No history of fracture.  -Osteoporosis is potentially related to postmenopausal status and history of celiac disease with insufficient calcium intake/absorption--we noted hypocalciuria at initial work-up, with improvement in urine calcium excretion after adjusting dietary calcium intake.  Our work-up for other secondary causes of osteoporosis was otherwise been unremarkable (normal SPEP/UPEP, thyroid function tests, PTH, CMP).    -Most recent DXA completed on 2/27/2023 showed significant decline in BMD.  ~L1-L4 T score -2.8, 5.3% decline in BMD compared to prior study in 2021 (significant).  ~Left femoral neck T score -1.2, left total hip T score -1.4.  ~Right femoral neck T score -1.2, right total hip T score -1.5.  Total mean hip 3.7% decline in BMD compared to 2021 (significant).  -We reinitiated Reclast after DXA showed decline in BMD, received dose on 4/25/2023.  2.  Celiac disease.  Follows strict gluten free diet. Also following a dairy free diet.    Pertinent Social History: , stepchildren. 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    PAST MEDICAL HISTORY  Past Medical History:   Diagnosis Date     B12 deficiency      Breast pain, left 02/21/2023     Celiac disease      Chest wall pain 06/23/2023     Chronic idiopathic constipation 11/08/2021     Diverticulitis      Diverticulosis      GERD (gastroesophageal reflux disease)     states main problem is heartburn     High cholesterol      Mumps      Osteoporosis      Shingles 01/01/2005     Spider veins      STD (sexually transmitted disease)        MEDICATIONS  Current Outpatient Medications   Medication Sig Dispense Refill     cyanocobalamin (CYANOCOBALAMIN) 1000 mcg/mL injection INJECT 1 ML INTO THE SHOULDER, THIGH, OR BUTTOCKS, EVERY 30 DAYS 3 mL 3     diltiazem 2% in PLO gel Apply pea sized amount 2 times daily as needed 30 g 0     estradiol (ESTRACE) 0.1 MG/GM  "vaginal cream Place vaginally three times a week. 42.5 g 4     hyoscyamine (LEVSIN) 0.125 MG tablet 0.125 to 0.25 mg every 4 to 8 hours or as needed; maximum: 1.5 mg/day. 90 tablet 3     Lactobacillus Rhamnosus, GG, ( PROBIOTIC DIGESTIVE CARE) CAPS Take 1 capsule by mouth       latanoprost (XALATAN) 0.005 % ophthalmic solution INT 1 GTT IN EACH EYE ONCE D       METAMUCIL FIBER PO        metroNIDAZOLE (METROCREAM) 0.75 % external cream Apply topically 2 times daily. 45 g 1     polyethylene glycol (MIRALAX) 17 GM/Dose powder Take 17 g by mouth       pravastatin (PRAVACHOL) 40 MG tablet Take 1 tablet (40 mg) by mouth daily. 90 tablet 3     timolol maleate (TIMOPTIC) 0.5 % ophthalmic solution Place 1 drop into both eyes every morning       valACYclovir (VALTREX) 500 MG tablet 500 mg BID x3 days for outbreak 30 tablet 1     VITAMIN D PO Take 3,000 Units by mouth       cyclobenzaprine (FLEXERIL) 5 MG tablet Take 1 tablet (5 mg) by mouth 3 times daily as needed for muscle spasms (Patient not taking: Reported on 4/30/2025) 30 tablet 1     Tuberculin-Allergy Syringes (BD SYRINGE SLIP TIP) 25G X 5/8\" 1 ML MISC FOR HOME  each 0       Allergies, family, and social history were reviewed and documented as needed in EHR.     REVIEW OF SYSTEMS  A focused ROS was performed, with pertinent positives and negatives as noted in the HPI.    PHYSICAL EXAM  /74 (BP Location: Right arm, Patient Position: Sitting, Cuff Size: Adult Regular)   Pulse 56   Ht 1.635 m (5' 4.37\")   Wt 74.2 kg (163 lb 8 oz)   BMI 27.74 kg/m    Body mass index is 27.74 kg/m .  Constitutional: Vital signs reviewed, as recorded above. Patient is alert, oriented and appears in no acute distress.  Eyes: PER, EOMI, no stare, lid lag, or retraction; no conjunctival injection.  Neck: Neck supple, no palpable thyromegaly.  Cardiovascular: RRR, normal S1/S2, no audible murmurs, rubs or gallops.  Respiratory: CTAB, without wheezes, crackles or rhonchi; " normal chest wall motion and respiratory effort.  MSK: No pain with palpation over the spine.  No clubbing or cyanosis; normal muscle bulk and tone.  Skin: Normal skin color, temperature, turgor and texture.  Neurological: Alert and oriented times 3. No tremor.    DATA REVIEW  Each of the following laboratory and/or imaging studies were reviewed.    DXA as in HPI.          ASSESSMENT  1.  Osteoporosis.  Without history of low trauma fracture, nor occult fracture on spine x-rays.  Potentially related to postmenopausal status and history of celiac disease.    ~We reinitiated Reclast in 4/2023, second dose in 5/2024.  Follow-up DXA scan shows significant improvement in BMD at the spine and hips.  We will administer third dose of Reclast before drug holiday.  ~Suspect hip pain is degenerative in nature: Quality is not strongly suggestive of AFF and x-ray does not show suspicious findings either.  However, we did review symptoms that would be concerning for AFF that should prompt more immediate contact with me.  ~Continue current calcium and vitamin D regimen.  Follow-up in 1 year, with lab draw (including bone turnover marker prior to considering drug holiday) before visit.    2.  Celiac disease.  Following strict gluten-free diet.    3.  Impaired fasting glucose.  Mild elevation in glucose values with normal hemoglobin A1c checked in primary care clinic.  Continue periodic monitoring with PCP.    PLAN  -Reclast infusion as scheduled in May  -Continue to aim for at least 1200 mg of calcium from diet  -Continue vitamin D at 3000 IU daily  -Return for a follow-up visit in one year, with fasting 9 AM labs before visit--contact me sooner if hip pain becomes constant, moves to the front of the thigh or there are any other bone-related concern  -We will communicate results via [employer], or if needed by phone      Orders Placed This Encounter   Procedures     Comprehensive metabolic panel     C-Telopeptide, Beta-Cross-Linked      Vitamin D Deficiency       I spent a total of 32 minutes on the date of encounter reviewing medical records, evaluating the patient, coordinating care and documenting in the EHR, as detailed above.      Rajwinder Rucker MD   Division of Diabetes, Endocrinology and Metabolism  Department of Medicine        Again, thank you for allowing me to participate in the care of your patient.        Sincerely,        Rajwinder Rucker MD    Electronically signed

## 2025-04-30 NOTE — PROGRESS NOTES
ENDOCRINOLOGY FOLLOW-UP      HISTORY OF PRESENT ILLNESS    Francisca Williamson is seen in follow-up.    Received second in this cycle of Reclast infusion on 5/14/2024 (received first dose of Reclast on 4/25/2023).    Around 6 months ago, she developed right hip pain (primarily in the groin) after helping her daughter with building a deck approximately 6 months ago; the pain improved but recurred in 1/2025.  Hip x-ray showed degenerative changes.  She is anticipating physical therapy.  Pain does not radiate down the thigh.  It waxes and wanes and has not been bothersome for the past few days.    Had follow-up DXA scan performed on 4/16/2025.  I reviewed images.  Bone mineral density is now in the range of low bone density.  -L1-L4 T-score -1.9, 11.4% increase in BMD compared to 2023 (significant)  -Left femoral neck T-score -1.1, left total hip T-score -1.3  -Right femoral neck T-score -1.2, right total hip T-score -1.4.  1.7% increase in mean hip BMD compared to 2023 (significant).    On gluten-free and dairy free diet.    Maintaining 1200 mg of calcium intake per day at a minimum: Typically has a serving of calcium fortified orange juice as well as 2 servings of almond milk each day in addition to her usual diet which is rich in greens.    On vitamin D3 3000 IU daily.    Pertinent endocrine and related history:  1.  Osteoporosis.  Previously diagnosed with osteoporosis and was initially treated with Fosamax which resulted in heartburn.  Therefore, she was transitioned to Reclast.  Some progress notes indicate she was treated with 3 doses of Reclast.  However, the patient recalls she may have received 2 doses.  Records in epic also indicate that she received a dose in 2014 and 2015: I do not see documentation for dose in 2016.  She tolerated Reclast without side effect.  -No history of fracture.  -Osteoporosis is potentially related to postmenopausal status and history of celiac disease with insufficient calcium  intake/absorption--we noted hypocalciuria at initial work-up, with improvement in urine calcium excretion after adjusting dietary calcium intake.  Our work-up for other secondary causes of osteoporosis was otherwise been unremarkable (normal SPEP/UPEP, thyroid function tests, PTH, CMP).    -Most recent DXA completed on 2/27/2023 showed significant decline in BMD.  ~L1-L4 T score -2.8, 5.3% decline in BMD compared to prior study in 2021 (significant).  ~Left femoral neck T score -1.2, left total hip T score -1.4.  ~Right femoral neck T score -1.2, right total hip T score -1.5.  Total mean hip 3.7% decline in BMD compared to 2021 (significant).  -We reinitiated Reclast after DXA showed decline in BMD, received dose on 4/25/2023.  2.  Celiac disease.  Follows strict gluten free diet. Also following a dairy free diet.    Pertinent Social History: , stepchildren. 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    PAST MEDICAL HISTORY  Past Medical History:   Diagnosis Date    B12 deficiency     Breast pain, left 02/21/2023    Celiac disease     Chest wall pain 06/23/2023    Chronic idiopathic constipation 11/08/2021    Diverticulitis     Diverticulosis     GERD (gastroesophageal reflux disease)     states main problem is heartburn    High cholesterol     Mumps     Osteoporosis     Shingles 01/01/2005    Spider veins     STD (sexually transmitted disease)        MEDICATIONS  Current Outpatient Medications   Medication Sig Dispense Refill    cyanocobalamin (CYANOCOBALAMIN) 1000 mcg/mL injection INJECT 1 ML INTO THE SHOULDER, THIGH, OR BUTTOCKS, EVERY 30 DAYS 3 mL 3    diltiazem 2% in PLO gel Apply pea sized amount 2 times daily as needed 30 g 0    estradiol (ESTRACE) 0.1 MG/GM vaginal cream Place vaginally three times a week. 42.5 g 4    hyoscyamine (LEVSIN) 0.125 MG tablet 0.125 to 0.25 mg every 4 to 8 hours or as needed; maximum: 1.5 mg/day. 90 tablet 3    Lactobacillus Rhamnosus, GG, (RA PROBIOTIC DIGESTIVE  "CARE) CAPS Take 1 capsule by mouth      latanoprost (XALATAN) 0.005 % ophthalmic solution INT 1 GTT IN EACH EYE ONCE D      METAMUCIL FIBER PO       metroNIDAZOLE (METROCREAM) 0.75 % external cream Apply topically 2 times daily. 45 g 1    polyethylene glycol (MIRALAX) 17 GM/Dose powder Take 17 g by mouth      pravastatin (PRAVACHOL) 40 MG tablet Take 1 tablet (40 mg) by mouth daily. 90 tablet 3    timolol maleate (TIMOPTIC) 0.5 % ophthalmic solution Place 1 drop into both eyes every morning      valACYclovir (VALTREX) 500 MG tablet 500 mg BID x3 days for outbreak 30 tablet 1    VITAMIN D PO Take 3,000 Units by mouth      cyclobenzaprine (FLEXERIL) 5 MG tablet Take 1 tablet (5 mg) by mouth 3 times daily as needed for muscle spasms (Patient not taking: Reported on 4/30/2025) 30 tablet 1    Tuberculin-Allergy Syringes (BD SYRINGE SLIP TIP) 25G X 5/8\" 1 ML MISC FOR HOME  each 0       Allergies, family, and social history were reviewed and documented as needed in EHR.     REVIEW OF SYSTEMS  A focused ROS was performed, with pertinent positives and negatives as noted in the HPI.    PHYSICAL EXAM  /74 (BP Location: Right arm, Patient Position: Sitting, Cuff Size: Adult Regular)   Pulse 56   Ht 1.635 m (5' 4.37\")   Wt 74.2 kg (163 lb 8 oz)   BMI 27.74 kg/m    Body mass index is 27.74 kg/m .  Constitutional: Vital signs reviewed, as recorded above. Patient is alert, oriented and appears in no acute distress.  Eyes: PER, EOMI, no stare, lid lag, or retraction; no conjunctival injection.  Neck: Neck supple, no palpable thyromegaly.  Cardiovascular: RRR, normal S1/S2, no audible murmurs, rubs or gallops.  Respiratory: CTAB, without wheezes, crackles or rhonchi; normal chest wall motion and respiratory effort.  MSK: No pain with palpation over the spine.  No clubbing or cyanosis; normal muscle bulk and tone.  Skin: Normal skin color, temperature, turgor and texture.  Neurological: Alert and oriented times 3. No " tremor.    DATA REVIEW  Each of the following laboratory and/or imaging studies were reviewed.    DXA as in HPI.          ASSESSMENT  1.  Osteoporosis.  Without history of low trauma fracture, nor occult fracture on spine x-rays.  Potentially related to postmenopausal status and history of celiac disease.    ~We reinitiated Reclast in 4/2023, second dose in 5/2024.  Follow-up DXA scan shows significant improvement in BMD at the spine and hips.  We will administer third dose of Reclast before drug holiday.  ~Suspect hip pain is degenerative in nature: Quality is not strongly suggestive of AFF and x-ray does not show suspicious findings either.  However, we did review symptoms that would be concerning for AFF that should prompt more immediate contact with me.  ~Continue current calcium and vitamin D regimen.  Follow-up in 1 year, with lab draw (including bone turnover marker prior to considering drug holiday) before visit.    2.  Celiac disease.  Following strict gluten-free diet.    3.  Impaired fasting glucose.  Mild elevation in glucose values with normal hemoglobin A1c checked in primary care clinic.  Continue periodic monitoring with PCP.    PLAN  -Reclast infusion as scheduled in May  -Continue to aim for at least 1200 mg of calcium from diet  -Continue vitamin D at 3000 IU daily  -Return for a follow-up visit in one year, with fasting 9 AM labs before visit--contact me sooner if hip pain becomes constant, moves to the front of the thigh or there are any other bone-related concern  -We will communicate results via [employer], or if needed by phone      Orders Placed This Encounter   Procedures    Comprehensive metabolic panel    C-Telopeptide, Beta-Cross-Linked    Vitamin D Deficiency       I spent a total of 32 minutes on the date of encounter reviewing medical records, evaluating the patient, coordinating care and documenting in the EHR, as detailed above.      Rajwinder Rucker MD   Division of Diabetes,  Endocrinology and Metabolism  Department of Medicine

## 2025-04-30 NOTE — PATIENT INSTRUCTIONS
-Reclast infusion as scheduled in May  -Continue to aim for at least 1200 mg of calcium from diet  -Continue vitamin D at 3000 IU daily  -Return for a follow-up visit in one year, with fasting 9 AM labs before visit--contact me sooner if hip pain becomes constant, moves to the front of the thigh or there are any other bone-related concern  -We will communicate results via [email or web address], or if needed by phone

## 2025-05-01 ASSESSMENT — ACTIVITIES OF DAILY LIVING (ADL)
WALKING_15_MINUTES_OR_GREATER: SLIGHT DIFFICULTY
SITTING_FOR_15_MINUTES: NO DIFFICULTY AT ALL
HEAVY_WORK: SLIGHT DIFFICULTY
WALKING_INITIALLY: NO DIFFICULTY AT ALL
ABILITY_TO_PARTICIPATE_IN_YOUR_DESIRED_SPORT_AS_LONG_AS_YOU_WOULD_LIKE: SLIGHT DIFFICULTY
WALKING_UP_STEEP_HILLS: NO DIFFICULTY AT ALL
HOW_WOULD_YOU_RATE_YOUR_CURRENT_LEVEL_OF_FUNCTION_DURING_YOUR_USUAL_ACTIVITIES_OF_DAILY_LIVING_FROM_0_TO_100_WITH_100_BEING_YOUR_LEVEL_OF_FUNCTION_PRIOR_TO_YOUR_HIP_PROBLEM_AND_0_BEING_THE_INABILITY_TO_PERFORM_ANY_OF_YOUR_USUAL_DAILY_ACTIVITIES?: 98
HOS_ADL_ITEM_SCORE_TOTAL: 64
WALKING_UP_STEEP_HILLS: NO DIFFICULTY AT ALL
ADL_COUNT: 17
JUMPING: SLIGHT DIFFICULTY
LANDING: NO DIFFICULTY AT ALL
SWINGING_OBJECTS_LIKE_A_GOLF_CLUB: NO DIFFICULTY AT ALL
HOW_WOULD_YOU_RATE_YOUR_CURRENT_LEVEL_OF_FUNCTION_DURING_YOUR_SPORTS_RELATED_ACTIVITIES_FROM_0_TO_100_WITH_100_BEING_YOUR_LEVEL_OF_FUNCTION_PRIOR_TO_YOUR_HIP_PROBLEM_AND_0_BEING_THE_INABILITY_TO_PERFORM_ANY_OF_YOUR_USUAL_DAILY_ACTIVITIES?: 97
HOS_ADL_SCORE(%): 94.12
TWISTING/PIVOTING ON INVOLVED LEG: NO DIFFICULTY AT ALL
ADL_SCORE(%): 0
WALKING_15_MINUTES_OR_GREATER: SLIGHT DIFFICULTY
LIGHT_TO_MODERATE_WORK: NO DIFFICULTY AT ALL
STEPPING UP AND DOWN CURBS: NO DIFFICULTY AT ALL
SPORTS_TOTAL_ITEM_SCORE: 0
ROLLING_OVER_IN_BED: NO DIFFICULTY AT ALL
SITTING FOR 15 MINUTES: NO DIFFICULTY AT ALL
GOING UP 1 FLIGHT OF STAIRS: NO DIFFICULTY AT ALL
HOS_ADL_HIGHEST_POTENTIAL_SCORE: 68
RECREATIONAL_ACTIVITIES: SLIGHT DIFFICULTY
SPORTS_HIGHEST_POTENTIAL_SCORE: 36
PLEASE_INDICATE_YOR_PRIMARY_REASON_FOR_REFERRAL_TO_THERAPY:: HIP
ROLLING OVER IN BED: NO DIFFICULTY AT ALL
GOING DOWN 1 FLIGHT OF STAIRS: NO DIFFICULTY AT ALL
GOING_DOWN_1_FLIGHT_OF_STAIRS: NO DIFFICULTY AT ALL
LOW_IMPACT_ACTIVITIES_LIKE_FAST_WALKING: NO DIFFICULTY AT ALL
WALKING_DOWN_STEEP_HILLS: NO DIFFICULTY AT ALL
GETTING_INTO_AND_OUT_OF_A_BATHTUB: NO DIFFICULTY AT ALL
HOW_WOULD_YOU_RATE_YOUR_CURRENT_LEVEL_OF_FUNCTION_DURING_YOUR_USUAL_ACTIVITIES_OF_DAILY_LIVING_FROM_0_TO_100_WITH_100_BEING_YOUR_LEVEL_OF_FUNCTION_PRIOR_TO_YOUR_HIP_PROBLEM_AND_0_BEING_THE_INABILITY_TO_PERFORM_ANY_OF_YOUR_USUAL_DAILY_ACTIVITIES?: 98
STANDING_FOR_15_MINUTES: NO DIFFICULTY AT ALL
WALKING_DOWN_STEEP_HILLS: NO DIFFICULTY AT ALL
PUTTING_ON_SOCKS_AND_SHOES: NO DIFFICULTY AT ALL
TWISTING/PIVOTING_ON_INVOLVED_LEG: NO DIFFICULTY AT ALL
HOW_WOULD_YOU_RATE_YOUR_CURRENT_LEVEL_OF_FUNCTION?: NEARLY NORMAL
SPORTS_COUNT: 9
RECREATIONAL ACTIVITIES: SLIGHT DIFFICULTY
STANDING FOR 15 MINUTES: NO DIFFICULTY AT ALL
SPORTS_SCORE(%): 0
GETTING_INTO_AND_OUT_OF_AN_AVERAGE_CAR: NO DIFFICULTY AT ALL
WALKING_INITIALLY: NO DIFFICULTY AT ALL
ABILITY_TO_PERFORM_ACTIVITY_WITH_YOUR_NORMAL_TECHNIQUE: NO DIFFICULTY AT ALL
ADL_HIGHEST_POTENTIAL_SCORE: 68
GETTING_INTO_AND_OUT_OF_A_BATHTUB: NO DIFFICULTY AT ALL
STEPPING_UP_AND_DOWN_CURBS: NO DIFFICULTY AT ALL
LIGHT_TO_MODERATE_WORK: NO DIFFICULTY AT ALL
PUTTING ON SOCKS AND SHOES: NO DIFFICULTY AT ALL
ADL_TOTAL_ITEM_SCORE: 0
CUTTING/LATERAL_MOVEMENTS: NO DIFFICULTY AT ALL
WALKING_FOR_APPROXIMATELY_10_MINUTES: NO DIFFICULTY AT ALL
HEAVY_WORK: SLIGHT DIFFICULTY
GOING_UP_1_FLIGHT_OF_STAIRS: NO DIFFICULTY AT ALL
GETTING INTO AND OUT OF AN AVERAGE CAR: NO DIFFICULTY AT ALL
WALKING_APPROXIMATELY_10_MINUTES: NO DIFFICULTY AT ALL
STARTING_AND_STOPPING_QUICKLY: NO DIFFICULTY AT ALL
DEEP_SQUATTING: SLIGHT DIFFICULTY
DEEP SQUATTING: SLIGHT DIFFICULTY

## 2025-05-06 ENCOUNTER — THERAPY VISIT (OUTPATIENT)
Dept: PHYSICAL THERAPY | Facility: REHABILITATION | Age: 70
End: 2025-05-06
Attending: INTERNAL MEDICINE
Payer: COMMERCIAL

## 2025-05-06 DIAGNOSIS — M16.11 PRIMARY OSTEOARTHRITIS OF RIGHT HIP: Primary | ICD-10-CM

## 2025-05-06 PROCEDURE — 97161 PT EVAL LOW COMPLEX 20 MIN: CPT | Mod: GP | Performed by: PHYSICAL THERAPIST

## 2025-05-06 PROCEDURE — 97110 THERAPEUTIC EXERCISES: CPT | Mod: GP | Performed by: PHYSICAL THERAPIST

## 2025-05-06 NOTE — PROGRESS NOTES
"PHYSICAL THERAPY EVALUATION  Type of Visit: Evaluation       Subjective     About a year ago pt was helping her daughter build a deck and was in charge of taping the joints. A few days later she started to notice discomfort in her groin area. This did not go away and so she saw a physician and had an X-ray. The X-ray showed degeneration of the hip joint and was referred to an ortho specialist. She saw her regular primary doctor who suggested to try PT for strengthening of hip. She is also dealing with plantar fascitis on R side. When she is walking she can get an \"achy\" feeling in her quad and knee when she walks which feels better when she stop. Pain is located on anterior thigh/groin area.     Pt is a walker, she is only able to walk about 30 min a day now but used to be able to walk longer (45 min to an hour). Sometimes getting up off a soft surface she can feel discomfort in both of her hips. She gardened today and she stated that it hurt. \"It isn't excruciating pain just an irritant.\" She has started splitting up her walks between the morning and night which has been helping and she has occasionally taken a tylenol which helps. She also has been trying to wear shoes more.     Sometimes when it is aching at night she will use a heating pad, but does not have to do that very often. Pt has history of osteopenia and osteoporosis. Is on an IV treatment once a year which she states that has been helping.     Pt's goals include getting back to walking the amount that she used to, minimize any further degeneration if possible, and to never have a hip replacement.         Presenting condition or subjective complaint: occasional discomfort - right hip; complicated currently with plantar fasciitis  Date of onset: 04/25/25    Relevant medical history: Vision problems   Dates & types of surgery: appendectomy - years ago    Prior diagnostic imaging/testing results: X-ray     Prior therapy history for the same diagnosis, " illness or injury: No      Prior Level of Function  Transfers: Independent  Ambulation: Independent  ADL:   IADL:     Living Environment  Social support: With a significant other or spouse   Type of home: House   Stairs to enter the home: Yes 1 Is there a railing: No     Ramp: No   Stairs inside the home: Yes 12 Is there a railing: Yes     Help at home: None; Home and Yard maintenance tasks; Other  Equipment owned:       Employment: No    Hobbies/Interests: gardening, reading, grandchildren activities    Patient goals for therapy: walk for a longer period of time, doing an average of 30 min/day currently    Pain assessment:  Pt feels an ache in the anterior portion of her hip that extends into her quad and knee with walking.      Objective   HIP EVALUATION  PAIN:   INTEGUMENTARY (edema, incisions):   POSTURE: Sitting Posture: Rounded shoulders, Forward head  GAIT: No use of assistive device, forward hip flexion during gait, no signs of imbalance or gait deviations  Weightbearing Status:   Assistive Device(s):   Gait Deviations:   BALANCE/PROPRIOCEPTION:   WEIGHTBEARING ALIGNMENT:   NON-WEIGHTBEARING ALIGNMENT:    ROM:  Decreased ER/IR of R hip, hip flexion WNL bilaterally. Hypomobility in low back with mild pain near sacrum with spring testing.    PELVIC/SI SCREEN:   STRENGTH:   Pain: - none + mild ++ moderate +++ severe  Strength Scale: 0-5/5 Left Right   Hip Flexion 4+ 4   Hip Extension     Hip Abduction 5- 3+   Hip Adduction     Hip Internal Rotation     Hip External Rotation     Knee Flexion 5 5-   Knee Extension       LE FLEXIBILITY:   SPECIAL TESTS:  FADIR (negative)  FUNCTIONAL TESTS:  10 MWT: 4.90 sec (1.22 m/s); 5 x STS: 18.60 sec; 30 sec STS: 8 reps  PALPATION:       Assessment & Plan   CLINICAL IMPRESSIONS  Medical Diagnosis: M16.11 (ICD-10-CM) - Primary osteoarthritis of right hip    Treatment Diagnosis: left hip pain with mobility and muscle power deficits   Impression/Assessment: Patient is a 69 year  "old female with chronic R hip pain complaints. Pt states that her pain is located on anterior thigh/groin area. She describes her pain as, \"it isn't excruciating pain just an irritant.\" She also states that when she is walking she can get an \"achy\" feeling in her quad and knee when she walks which feels better when she stops. She had an X-ray earlier this year (2025) which showed degeneration of her hip joint. She has been having an increase in symptoms due to developing plantar fascitis on R side. The following significant findings have been identified: Pain and Decreased strength. These impairments interfere with their ability to perform recreational activities, household mobility, and community mobility as compared to previous level of function. PT objective testing consistent with decreased LE strength bilaterally. Pt would benefit from skilled 1:1 PT services in order to increase functional mobility and progress towards PLOF.     Clinical Decision Making (Complexity):  Clinical Presentation: Stable/Uncomplicated  Clinical Presentation Rationale: based on medical and personal factors listed in PT evaluation  Clinical Decision Making (Complexity): Low complexity    PLAN OF CARE  Treatment Interventions:  Interventions: Therapeutic Activity, Therapeutic Exercise    Long Term Goals     PT Goal 1  Goal Identifier: HEP  Goal Description: Pt will be 50% complient with HEP in order to progress towards functional and personal goals.  Target Date: 08/04/25  PT Goal 2  Goal Identifier: STS  Goal Description: Pt will decrease 5 x STS time from 18 seconds to 13 seconds and be able to complete at least 10 sit to stands in 30 seconds in order to be within age/gender norms.  Rationale: to maximize safety and independence with performance of ADLs and functional tasks;to maximize safety and independence within the home;to maximize safety and independence within the community  Target Date: 08/04/25  PT Goal 3  Goal Identifier: " Walking  Goal Description: Pt will describe being able to walk for at least 45 min without c/o increase symptoms in order to retun to PLOF.  Rationale: to maximize safety and independence within the home;to maximize safety and independence within the community  Target Date: 08/04/25  PT Goal 4  Goal Identifier: Gardening  Goal Description: Pt will be able to garden for at least 30 min without c/o increased symptoms in order to return to PLOF.  Rationale: to maximize safety and independence with performance of ADLs and functional tasks  Target Date: 08/04/25      Frequency of Treatment: every other week  Duration of Treatment: 12 weeks    Recommended Referrals to Other Professionals:  none  Education Assessment:   Learner/Method: Patient  Education Comments: Education provided on HEP    Risks and benefits of evaluation/treatment have been explained.   Patient/Family/caregiver agrees with Plan of Care.     Evaluation Time:     PT Eval, Low Complexity Minutes (83838): 30     Signing Clinician: Eliceo Castillo PT        [facility]                                                                                   OUTPATIENT PHYSICAL THERAPY      PLAN OF TREATMENT FOR OUTPATIENT REHABILITATION   Patient's Last Name, First Name, Francisca Leiva YOB: 1955   Provider's Name   [facility]   Medical Record No.  [id number]     Onset Date: 04/25/25  Start of Care Date: 05/06/25     Medical Diagnosis:  M16.11 (ICD-10-CM) - Primary osteoarthritis of right hip      PT Treatment Diagnosis:  left hip pain with mobility and muscle power deficits Plan of Treatment  Frequency/Duration: every other week/ 12 weeks    Certification date from 05/06/25 to 08/04/25         See note for plan of treatment details and functional goals     Eliceo Castillo PT                         I CERTIFY THE NEED FOR THESE SERVICES FURNISHED UNDER        THIS PLAN OF TREATMENT  AND WHILE UNDER MY CARE     (Physician attestation of this document indicates review and certification of the therapy plan).              Referring Provider:  Melva Melchor    Initial Assessment  See Epic Evaluation- Start of Care Date: 05/06/25

## 2025-05-07 ENCOUNTER — MYC MEDICAL ADVICE (OUTPATIENT)
Dept: ENDOCRINOLOGY | Facility: CLINIC | Age: 70
End: 2025-05-07
Payer: COMMERCIAL

## 2025-05-07 DIAGNOSIS — F40.240 CLAUSTROPHOBIA: Primary | ICD-10-CM

## 2025-05-08 RX ORDER — DIAZEPAM 2 MG/1
TABLET ORAL
Qty: 2 TABLET | Refills: 0 | Status: SHIPPED | OUTPATIENT
Start: 2025-05-08

## 2025-05-08 NOTE — TELEPHONE ENCOUNTER
Per [facility]:  In the interest of making sure there is not any other underlying issue, I would propose rescheduling your Reclast infusion to a later date and imaging the right femur by MRI: Your x-ray did not show abnormalities but the MRI is more sensitive.     If MRI is normal, we can then proceed with Reclast infusion.     I have placed order for this study and you can contact radiology to schedule (I we will include phone number for radiology at the end of my message).

## 2025-05-16 ENCOUNTER — HOSPITAL ENCOUNTER (OUTPATIENT)
Dept: MRI IMAGING | Facility: HOSPITAL | Age: 70
Discharge: HOME | End: 2025-05-16
Attending: INTERNAL MEDICINE | Admitting: INTERNAL MEDICINE
Payer: COMMERCIAL

## 2025-05-16 DIAGNOSIS — M81.0 OSTEOPOROSIS, UNSPECIFIED OSTEOPOROSIS TYPE, UNSPECIFIED PATHOLOGICAL FRACTURE PRESENCE: ICD-10-CM

## 2025-05-16 DIAGNOSIS — M79.651 PAIN OF RIGHT THIGH: ICD-10-CM

## 2025-05-16 PROCEDURE — 73718 MRI LOWER EXTREMITY W/O DYE: CPT | Mod: RT

## 2025-05-20 ENCOUNTER — RESULTS FOLLOW-UP (OUTPATIENT)
Dept: ENDOCRINOLOGY | Facility: CLINIC | Age: 70
End: 2025-05-20

## 2025-05-20 ENCOUNTER — MYC MEDICAL ADVICE (OUTPATIENT)
Dept: ENDOCRINOLOGY | Facility: CLINIC | Age: 70
End: 2025-05-20
Payer: COMMERCIAL

## 2025-05-20 DIAGNOSIS — M84.350A STRESS FRACTURE OF RIGHT PUBIC RAMUS: Primary | ICD-10-CM

## 2025-05-21 NOTE — TELEPHONE ENCOUNTER
Per [facility]:  In the interest of making sure there is not any other underlying issue, I would propose rescheduling your Reclast infusion to a later date and imaging the right femur by MRI: Your x-ray did not show abnormalities but the MRI is more sensitive.     If MRI is normal, we can then proceed with Reclast infusion.

## 2025-05-27 SDOH — HEALTH STABILITY: PHYSICAL HEALTH: ON AVERAGE, HOW MANY MINUTES DO YOU ENGAGE IN EXERCISE AT THIS LEVEL?: 30 MIN

## 2025-05-27 SDOH — HEALTH STABILITY: PHYSICAL HEALTH: ON AVERAGE, HOW MANY DAYS PER WEEK DO YOU ENGAGE IN MODERATE TO STRENUOUS EXERCISE (LIKE A BRISK WALK)?: 5 DAYS

## 2025-05-29 ENCOUNTER — OFFICE VISIT (OUTPATIENT)
Dept: ORTHOPEDICS | Facility: CLINIC | Age: 70
End: 2025-05-29
Payer: COMMERCIAL

## 2025-05-29 DIAGNOSIS — M25.552 LEFT HIP PAIN: Primary | ICD-10-CM

## 2025-05-29 DIAGNOSIS — M84.350A STRESS FRACTURE OF RIGHT PUBIC RAMUS: ICD-10-CM

## 2025-05-29 DIAGNOSIS — M25.551 RIGHT HIP PAIN: ICD-10-CM

## 2025-05-29 RX ORDER — LORAZEPAM 0.5 MG/1
TABLET ORAL
Qty: 2 TABLET | Refills: 0 | Status: SHIPPED | OUTPATIENT
Start: 2025-05-29

## 2025-05-29 NOTE — PROGRESS NOTES
ASSESSMENT & PLAN    Francisca was seen today for pubic rami fracture.    Diagnoses and all orders for this visit:    Left hip pain  -     MR Pelvis Bone wo Contrast; Future  -     LORazepam (ATIVAN) 0.5 MG tablet; Take 1 tablet (0.5 mg) 30 minutes prior to procedure.  Can repeat dose in 30 minutes if needed.  DO NOT DRIVE.    Stress fracture of right pubic ramus  -     Orthopedic  Referral  -     XR Pelvis 1/2 Views; Future  -     MR Pelvis Bone wo Contrast; Future  -     LORazepam (ATIVAN) 0.5 MG tablet; Take 1 tablet (0.5 mg) 30 minutes prior to procedure.  Can repeat dose in 30 minutes if needed.  DO NOT DRIVE.    Right hip pain      This issue is acute on chronic and Worsening.        ICD-10-CM    1. Left hip pain  M25.552 MR Pelvis Bone wo Contrast     LORazepam (ATIVAN) 0.5 MG tablet      2. Stress fracture of right pubic ramus  M84.350A Orthopedic  Referral     XR Pelvis 1/2 Views     MR Pelvis Bone wo Contrast     LORazepam (ATIVAN) 0.5 MG tablet      3. Right hip pain  M25.551         Patient Instructions   Stress fracture of right pubic ramus, only partially visualized on right femur MRI.  Patient is also now having left hip pain.  Recommend MRI of the pelvis to fully evaluate.  Patient does also have hip arthritis that could be contributing.    Plan:  - Today's Plan of Care:  MRI of the Pelvis - Call [phone] to schedule MRI    Lorazepam prescribed for procedural sedation (I reviewed the mechanism of action as well as risk/benefit profile of medications. Questions answered.)    Discussed activity considerations and other supportive care including Ice/Heat, OTC and other topical medications as needed.    Discussed rest from extended walking, impact  Cane or crutches as needed to limit weight bearing if walking is painful    -We also discussed other future treatment options:  Consider Bone Health referral pending MRI results and symptoms    Follow Up: In clinic after MRI (wait at least  "1-2 days)    Concerning signs and symptoms were reviewed and all questions were answered at this time.    Thanks for the opportunity to participate in the care of this patient, I will keep you updated on their progress.    CC: Rajwinder Thomas MD [facility] [place]  Sports Medicine Physician  [facility]      -----  Chief Complaint   Patient presents with    Pelvis - Pubic Rami Fracture       SUBJECTIVE  Francisca Williamson is a/an 69 year old female who is seen in consultation at the request of  Rajwinder Rucker M.D. for evaluation of pelvis fracture.    The patient is seen by themselves.    Onset: 1 years(s) ago. Patient describes possible injury as while helping her daughter build a deck, raising her leg up and didn't think much about it.  Experienced soreness/discomfort at that time.  6 months after this she finally went to get an xray in January 2025. More pain within her femur while planting and digging her garden May 2025 and MRI obtained.  Location of Pain: right sided hip and \"femur\" pain. She just started having pain on the left side in the last 2 weeks. Worsened by: walking, prolonged sitting (maybe?), she has not had a lot of pain, just describes discomfort  Better with: unsure   Treatments tried: rest/activity avoidance, heat, physical therapy (1 visits), and previous imaging (MRI 5/16/25 and xray 1/28/25)  Associated symptoms: discomfort is main symptom, denies all other symptoms     Orthopedic/Surgical history: YES - Date: Patient has Osteoporosis, currently on treatment  Social History/Occupation: Very active around her home,  for her grandchildren       REVIEW OF SYSTEMS:  Review of Systems    OBJECTIVE:  There were no vitals taken for this visit.   General: healthy, alert and in no distress  Skin: no suspicious lesions or rash.  CV: distal perfusion intact   Resp: normal respiratory effort without conversational dyspnea   Psych: normal mood and affect  Gait: " NORMAL  Neuro: Normal light sensory exam of lower extremity    Bilateral hip exam    Inspection:      no edema or ecchymosis in hip area    Tender:      none bilateral - points to groin bilaterally    Non Tender:      remainder of the hip area bilateral    ROM:     Full active and passive ROM  bilateral    Strength:      flexion 5/5 bilateral       abduction 5/5 bilateral       adduction 5/5 bilateral    Sensation:      grossly intact in hip and thigh    Special Tests:      neg (-) ESTEBAN bilateral       neg (-) FADIR bilateral      RADIOLOGY:  Final results and radiologist's interpretation, available in the [facility] health record.  Images were reviewed with the patient in the office today.  My personal interpretation of the performed imaging:     AP pelvis XR views reviewed and compared to prior XRs 1/28/2025: no acute bony abnormality, significant degenerative change bilateral hips  - will follow official read    Reviewed MRI right femur - non displaced stress fracture involving the right pubic root, moderate hip joint degenerative changes    MR Femur Thigh Right wo Contrast  Result Date: 5/18/2025  EXAM: MR FEMUR THIGH RIGHT WITHOUT CONTRAST LOCATION: [facility] DATE: 05/16/2025 INDICATION: Patient with osteoporosis on bisphosphonate therapy developing right thigh pain. Please evaluate by MRI for findings worrisome for atypical femur fracture. COMPARISON: None. TECHNIQUE: Unenhanced. FINDINGS: JOINTS AND BONES: -Bone marrow edema within the right pubic root with subtle superimposed linear low signal intensity concerning for a nondisplaced stress fracture. No fracture or stress reaction involving the right femur. No evidence of osteonecrosis involving the femoral head. No concerning marrow replacing lesion. Moderate degenerative arthritis in the right hip and degenerative tearing of the acetabular labrum. Patellofemoral compartment chondromalacia with deep partial-thickness to full-thickness  chondral loss  over the lateral patellar facet. TENDONS: -Chronic tendinopathy and low-grade partial tearing of the right proximal hamstrings conjoint tendon. The right distal gluteal, proximal hamstrings and iliopsoas tendons are intact. Mild tendinopathy of the distal quadriceps tendon near the patellar attachment. MUSCLES AND SOFT TISSUES: -No muscle atrophy or edema. No evidence of an acute muscular injury. No soft tissue mass or fluid collection. Unremarkable neurovascular structures. Normal appearance of the imaged pelvic viscera.     IMPRESSION: 1.  Nondisplaced stress fracture involving the right pubic root. No fracture or stress reaction involving the right femur. 2.  No evidence of an acute myotendinous injury in the right thigh. 3.  Moderate degenerative arthritis of the right hip and degenerative tearing of the acetabular labrum. 4.  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     Review of prior external note(s) from - PCP  Review of the result(s) of each unique test - XR and MRI

## 2025-05-29 NOTE — LETTER
5/29/2025      Francisca Williamson  [address]  [place] [address]      Dear Colleague,    Thank you for referring your patient, Francisca Williamson, to the [facility]. Please see a copy of my visit note below.    ASSESSMENT & PLAN    Francisca was seen today for pubic rami fracture.    Diagnoses and all orders for this visit:    Left hip pain  -     MR Pelvis Bone wo Contrast; Future  -     LORazepam (ATIVAN) 0.5 MG tablet; Take 1 tablet (0.5 mg) 30 minutes prior to procedure.  Can repeat dose in 30 minutes if needed.  DO NOT DRIVE.    Stress fracture of right pubic ramus  -     Orthopedic  Referral  -     XR Pelvis 1/2 Views; Future  -     MR Pelvis Bone wo Contrast; Future  -     LORazepam (ATIVAN) 0.5 MG tablet; Take 1 tablet (0.5 mg) 30 minutes prior to procedure.  Can repeat dose in 30 minutes if needed.  DO NOT DRIVE.    Right hip pain      This issue is acute on chronic and Worsening.        ICD-10-CM    1. Left hip pain  M25.552 MR Pelvis Bone wo Contrast     LORazepam (ATIVAN) 0.5 MG tablet      2. Stress fracture of right pubic ramus  M84.350A Orthopedic  Referral     XR Pelvis 1/2 Views     MR Pelvis Bone wo Contrast     LORazepam (ATIVAN) 0.5 MG tablet      3. Right hip pain  M25.551         Patient Instructions   Stress fracture of right pubic ramus, only partially visualized on right femur MRI.  Patient is also now having left hip pain.  Recommend MRI of the pelvis to fully evaluate.  Patient does also have hip arthritis that could be contributing.    Plan:  - Today's Plan of Care:  MRI of the Pelvis - Call [phone] to schedule MRI    Lorazepam prescribed for procedural sedation (I reviewed the mechanism of action as well as risk/benefit profile of medications. Questions answered.)    Discussed activity considerations and other supportive care including Ice/Heat, OTC and other topical medications as needed.    Discussed rest from extended walking,  "impact  Cane or crutches as needed to limit weight bearing if walking is painful    -We also discussed other future treatment options:  Consider Bone Health referral pending MRI results and symptoms    Follow Up: In clinic after MRI (wait at least 1-2 days)    Concerning signs and symptoms were reviewed and all questions were answered at this time.    Thanks for the opportunity to participate in the care of this patient, I will keep you updated on their progress.    CC: Rajwinder Thomas MD [facility]  Sports Medicine Physician  [facility]      -----  Chief Complaint   Patient presents with     Pelvis - Pubic Rami Fracture       SUBJECTIVE  Francisca Williamson is a/an 69 year old female who is seen in consultation at the request of  Rajwinder Rucker M.D. for evaluation of pelvis fracture.    The patient is seen by themselves.    Onset: 1 years(s) ago. Patient describes possible injury as while helping her daughter build a deck, raising her leg up and didn't think much about it.  Experienced soreness/discomfort at that time.  6 months after this she finally went to get an xray in January 2025. More pain within her femur while planting and digging her garden May 2025 and MRI obtained.  Location of Pain: right sided hip and \"femur\" pain. She just started having pain on the left side in the last 2 weeks. Worsened by: walking, prolonged sitting (maybe?), she has not had a lot of pain, just describes discomfort  Better with: unsure   Treatments tried: rest/activity avoidance, heat, physical therapy (1 visits), and previous imaging (MRI 5/16/25 and xray 1/28/25)  Associated symptoms: discomfort is main symptom, denies all other symptoms     Orthopedic/Surgical history: YES - Date: Patient has Osteoporosis, currently on treatment  Social History/Occupation: Very active around her home,  for her grandchildren       REVIEW OF SYSTEMS:  Review of Systems    OBJECTIVE:  There were no " vitals taken for this visit.   General: healthy, alert and in no distress  Skin: no suspicious lesions or rash.  CV: distal perfusion intact   Resp: normal respiratory effort without conversational dyspnea   Psych: normal mood and affect  Gait: NORMAL  Neuro: Normal light sensory exam of lower extremity    Bilateral hip exam    Inspection:      no edema or ecchymosis in hip area    Tender:      none bilateral - points to groin bilaterally    Non Tender:      remainder of the hip area bilateral    ROM:     Full active and passive ROM  bilateral    Strength:      flexion 5/5 bilateral       abduction 5/5 bilateral       adduction 5/5 bilateral    Sensation:      grossly intact in hip and thigh    Special Tests:      neg (-) ESTEBAN bilateral       neg (-) FADIR bilateral      RADIOLOGY:  Final results and radiologist's interpretation, available in the [facility] health record.  Images were reviewed with the patient in the office today.  My personal interpretation of the performed imaging:     AP pelvis XR views reviewed and compared to prior XRs 1/28/2025: no acute bony abnormality, significant degenerative change bilateral hips  - will follow official read    Reviewed MRI right femur - non displaced stress fracture involving the right pubic root, moderate hip joint degenerative changes    MR Femur Thigh Right wo Contrast  Result Date: 5/18/2025  EXAM: MR FEMUR THIGH RIGHT WITHOUT CONTRAST LOCATION: [facility] DATE: 05/16/2025 INDICATION: Patient with osteoporosis on bisphosphonate therapy developing right thigh pain. Please evaluate by MRI for findings worrisome for atypical femur fracture. COMPARISON: None. TECHNIQUE: Unenhanced. FINDINGS: JOINTS AND BONES: -Bone marrow edema within the right pubic root with subtle superimposed linear low signal intensity concerning for a nondisplaced stress fracture. No fracture or stress reaction involving the right femur. No evidence of osteonecrosis involving  the femoral head. No concerning marrow replacing lesion. Moderate degenerative arthritis in the right hip and degenerative tearing of the acetabular labrum. Patellofemoral compartment chondromalacia with deep partial-thickness to full-thickness chondral loss  over the lateral patellar facet. TENDONS: -Chronic tendinopathy and low-grade partial tearing of the right proximal hamstrings conjoint tendon. The right distal gluteal, proximal hamstrings and iliopsoas tendons are intact. Mild tendinopathy of the distal quadriceps tendon near the patellar attachment. MUSCLES AND SOFT TISSUES: -No muscle atrophy or edema. No evidence of an acute muscular injury. No soft tissue mass or fluid collection. Unremarkable neurovascular structures. Normal appearance of the imaged pelvic viscera.     IMPRESSION: 1.  Nondisplaced stress fracture involving the right pubic root. No fracture or stress reaction involving the right femur. 2.  No evidence of an acute myotendinous injury in the right thigh. 3.  Moderate degenerative arthritis of the right hip and degenerative tearing of the acetabular labrum. 4.  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     Review of prior external note(s) from - PCP  Review of the result(s) of each unique test - XR and MRI           Again, thank you for allowing me to participate in the care of your patient.        Sincerely,        Krystin Thomas MD    Electronically signed

## 2025-05-29 NOTE — PATIENT INSTRUCTIONS
Stress fracture of right pubic ramus, only partially visualized on right femur MRI.  Patient is also now having left hip pain.  Recommend MRI of the pelvis to fully evaluate.  Patient does also have hip arthritis that could be contributing.    Plan:  - Today's Plan of Care:  MRI of the Pelvis - Call [phone] to schedule MRI    Lorazepam prescribed for procedural sedation (I reviewed the mechanism of action as well as risk/benefit profile of medications. Questions answered.)    Discussed activity considerations and other supportive care including Ice/Heat, OTC and other topical medications as needed.    Discussed rest from extended walking, impact  Cane or crutches as needed to limit weight bearing if walking is painful    -We also discussed other future treatment options:  Consider Bone Health referral pending MRI results and symptoms    Follow Up: In clinic after MRI (wait at least 1-2 days)    If you have any further questions for your physician or physician s care team you can call [phone].     Dr. Thomas will be out on an extended ALFREDO for June, July, August & will be returning in September 2025.      If you are a patient requesting to be seen follow up, not specifically desiring/requesting an ultrasound guided procedure, you are free schedule with any of the Sports Medicine providers within the [facility] group.  A full list of our providers & their practice locations can be found at [facility].  Our central scheduling number is [phone] to assist in scheduling.  The below providers work directly with Dr. Thomas at the [facility].    DO Kennedy Weathers MD Christian Matthews, MD Scott Repa, DO      Please feel free to reach out to team directly with any other specific questions or concerns regarding your care.

## 2025-06-06 ENCOUNTER — HOSPITAL ENCOUNTER (OUTPATIENT)
Dept: MRI IMAGING | Facility: HOSPITAL | Age: 70
Discharge: HOME | End: 2025-06-06
Attending: PEDIATRICS | Admitting: PEDIATRICS
Payer: COMMERCIAL

## 2025-06-06 DIAGNOSIS — M84.350A STRESS FRACTURE OF RIGHT PUBIC RAMUS: ICD-10-CM

## 2025-06-06 DIAGNOSIS — M25.552 LEFT HIP PAIN: ICD-10-CM

## 2025-06-06 PROCEDURE — 72195 MRI PELVIS W/O DYE: CPT

## 2025-06-08 SDOH — HEALTH STABILITY: PHYSICAL HEALTH: ON AVERAGE, HOW MANY DAYS PER WEEK DO YOU ENGAGE IN MODERATE TO STRENUOUS EXERCISE (LIKE A BRISK WALK)?: 0 DAYS

## 2025-06-08 SDOH — HEALTH STABILITY: PHYSICAL HEALTH: ON AVERAGE, HOW MANY MINUTES DO YOU ENGAGE IN EXERCISE AT THIS LEVEL?: 0 MIN

## 2025-06-10 ENCOUNTER — RESULTS FOLLOW-UP (OUTPATIENT)
Dept: ORTHOPEDICS | Facility: CLINIC | Age: 70
End: 2025-06-10

## 2025-06-13 ENCOUNTER — OFFICE VISIT (OUTPATIENT)
Dept: ORTHOPEDICS | Facility: CLINIC | Age: 70
End: 2025-06-13
Payer: COMMERCIAL

## 2025-06-13 DIAGNOSIS — M76.891 HAMSTRING TENDINITIS OF RIGHT THIGH: ICD-10-CM

## 2025-06-13 DIAGNOSIS — M70.71 ISCHIAL BURSITIS OF RIGHT SIDE: ICD-10-CM

## 2025-06-13 DIAGNOSIS — G89.29 CHRONIC RIGHT HIP PAIN: ICD-10-CM

## 2025-06-13 DIAGNOSIS — M25.551 CHRONIC RIGHT HIP PAIN: ICD-10-CM

## 2025-06-13 DIAGNOSIS — M16.11 PRIMARY OSTEOARTHRITIS OF RIGHT HIP: ICD-10-CM

## 2025-06-13 DIAGNOSIS — M84.30XA STRESS REACTION OF BONE: Primary | ICD-10-CM

## 2025-06-13 PROCEDURE — 99213 OFFICE O/P EST LOW 20 MIN: CPT | Performed by: FAMILY MEDICINE

## 2025-06-13 NOTE — LETTER
2025      Francisca Williamson  [address] [place] [address]      Dear Colleague,    Thank you for referring your patient, Francisca Williamson, to the [facility]. Please see a copy of my visit note below.    Francisca Williamson  :  1955  DOS: 2025  MRN: [id number]    ASSESSMENT & PLAN    Francisca was seen today for follow up.    Diagnoses and all orders for this visit:    Stress reaction of bone  -     Physical Therapy  Referral; Future    Primary osteoarthritis of right hip  -     Physical Therapy  Referral; Future    Ischial bursitis of right side  -     Physical Therapy  Referral; Future    Chronic right hip pain  -     Physical Therapy  Referral; Future    Hamstring tendinitis of right thigh  -     Physical Therapy  Referral; Future      This issue is acute on chronic and improving.  MR and XR images independently visualized and reviewed with patient today in clinic  Overall improving pelvis pain, has documented stress reaction adjacent to right hip joint in acetabular rim  Underlying DJD reviewed  Both joint and adjacent pubic region minimally tender  Most TTP over ischial tuberosity and hamstring on the right  PT ordered  Sit bone cushion option reviewed  If hip joint pain increases can consider US guided CSI trial, hopeful to avoi    Discussed activity considerations and other supportive care including Ice/Heat, OTC and other topical medications as needed.    Discussed rest from extended walking, impact  Cane or crutches as needed to limit weight bearing if walking is painful    We also discussed other future treatment options:  Consider Bone Health referral pending clinical course    Follow Up: 1 month, sooner if needed    Concerning signs and symptoms were reviewed and all questions were answered at this time.      Asael Bautista DO, CAQ  Sports Medicine Physician  [facility]  "Medicine      -----  Chief Complaint   Patient presents with     Pelvis - Follow Up       SUBJECTIVE  Francisca Williamson is a/an 69 year old female who is seen in consultation at the request of  Rajwinder Rucker M.D. for evaluation of pelvis fracture.    The patient is seen by themselves.    Onset: 1 years(s) ago. Patient describes possible injury as while helping her daughter build a deck, raising her leg up and didn't think much about it.  Experienced soreness/discomfort at that time.  6 months after this she finally went to get an xray in January 2025. More pain within her femur while planting and digging her garden May 2025 and MRI obtained.  Location of Pain: right sided hip and \"femur\" pain. She just started having pain on the left side in the last 2 weeks. Worsened by: walking, prolonged sitting (maybe?), she has not had a lot of pain, just describes discomfort  Better with: unsure   Treatments tried: rest/activity avoidance, heat, physical therapy (1 visits), and previous imaging (MRI 5/16/25 and xray 1/28/25)  Associated symptoms: discomfort is main symptom, denies all other symptoms     Interim History - June 13, 2025  Since last visit on 5/29/2025 patient has not been doing well. She states she feels a little pinch and is using a sitting pad is helpful. Otherwise she is doing okay.  She is using a cane, but unsure if it is helpful.   No interim injury.   She has lots of questions today.       Orthopedic/Surgical history: YES - Date: Patient has Osteoporosis, currently on treatment  Social History/Occupation: Very active around her home,  for her grandchildren       REVIEW OF SYSTEMS:  Review of Systems    OBJECTIVE:  There were no vitals taken for this visit.   General: healthy, alert and in no distress  Skin: no suspicious lesions or rash.  CV: distal perfusion intact   Resp: normal respiratory effort without conversational dyspnea   Psych: normal mood and affect  Gait: NORMAL  Neuro: Normal light " sensory exam of lower extremity    Bilateral hip exam    Inspection:      no edema or ecchymosis in hip area    Tender:      right ischial tuberosity    Non Tender:      remainder of the hip area bilateral    ROM:     Full active and passive ROM  bilateral, painful hamstring stretch R>>L    Strength:      flexion 5/5 bilateral       abduction 5/5 bilateral       adduction 5/5 bilateral    Sensation:      grossly intact in hip and thigh    Special Tests:      neg (-) ESTEBAN bilateral       neg (-) FADIR bilateral      RADIOLOGY:  Final results and radiologist's interpretation, available in the [facility] health record.  Images were reviewed with the patient in the office today.  My personal interpretation of the performed imaging:     AP pelvis XR views reviewed and compared to prior XRs 1/28/2025: no acute bony abnormality, significant degenerative change bilateral hips  - will follow official read    Reviewed MRI right femur - non displaced stress fracture involving the right pubic root, moderate hip joint degenerative changes    MR Femur Thigh Right wo Contrast  Result Date: 5/18/2025  EXAM: MR FEMUR THIGH RIGHT WITHOUT CONTRAST LOCATION: [facility] DATE: 05/16/2025 INDICATION: Patient with osteoporosis on bisphosphonate therapy developing right thigh pain. Please evaluate by MRI for findings worrisome for atypical femur fracture. COMPARISON: None. TECHNIQUE: Unenhanced. FINDINGS: JOINTS AND BONES: -Bone marrow edema within the right pubic root with subtle superimposed linear low signal intensity concerning for a nondisplaced stress fracture. No fracture or stress reaction involving the right femur. No evidence of osteonecrosis involving the femoral head. No concerning marrow replacing lesion. Moderate degenerative arthritis in the right hip and degenerative tearing of the acetabular labrum. Patellofemoral compartment chondromalacia with deep partial-thickness to full-thickness chondral loss   over the lateral patellar facet. TENDONS: -Chronic tendinopathy and low-grade partial tearing of the right proximal hamstrings conjoint tendon. The right distal gluteal, proximal hamstrings and iliopsoas tendons are intact. Mild tendinopathy of the distal quadriceps tendon near the patellar attachment. MUSCLES AND SOFT TISSUES: -No muscle atrophy or edema. No evidence of an acute muscular injury. No soft tissue mass or fluid collection. Unremarkable neurovascular structures. Normal appearance of the imaged pelvic viscera.     IMPRESSION: 1.  Nondisplaced stress fracture involving the right pubic root. No fracture or stress reaction involving the right femur. 2.  No evidence of an acute myotendinous injury in the right thigh. 3.  Moderate degenerative arthritis of the right hip and degenerative tearing of the acetabular labrum. 4.  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        Again, thank you for allowing me to participate in the care of your patient.        Sincerely,        Asael Bautista, DO    Electronically signed

## 2025-06-13 NOTE — PROGRESS NOTES
Francisca Williamson  :  1955  DOS: 2025  MRN: [id number]    ASSESSMENT & PLAN    Francisca was seen today for follow up.    Diagnoses and all orders for this visit:    Stress reaction of bone  -     Physical Therapy  Referral; Future    Primary osteoarthritis of right hip  -     Physical Therapy  Referral; Future    Ischial bursitis of right side  -     Physical Therapy  Referral; Future    Chronic right hip pain  -     Physical Therapy  Referral; Future    Hamstring tendinitis of right thigh  -     Physical Therapy  Referral; Future      This issue is acute on chronic and improving.  MR and XR images independently visualized and reviewed with patient today in clinic  Overall improving pelvis pain, has documented stress reaction adjacent to right hip joint in acetabular rim  Underlying DJD reviewed  Both joint and adjacent pubic region minimally tender  Most TTP over ischial tuberosity and hamstring on the right  PT ordered  Sit bone cushion option reviewed  If hip joint pain increases can consider US guided CSI trial, hopeful to avoi    Discussed activity considerations and other supportive care including Ice/Heat, OTC and other topical medications as needed.    Discussed rest from extended walking, impact  Cane or crutches as needed to limit weight bearing if walking is painful    We also discussed other future treatment options:  Consider Bone Health referral pending clinical course    Follow Up: 1 month, sooner if needed    Concerning signs and symptoms were reviewed and all questions were answered at this time.      Asael Bautista DO, KRISTI  Sports Medicine Physician  [facility]      -----  Chief Complaint   Patient presents with    Pelvis - Follow Up       SUBJECTIVE  Francisca Williamson is a/an 69 year old female who is seen in consultation at the request of  Rajwinder Rucker M.D. for evaluation of pelvis fracture.    The patient  "is seen by themselves.    Onset: 1 years(s) ago. Patient describes possible injury as while helping her daughter build a deck, raising her leg up and didn't think much about it.  Experienced soreness/discomfort at that time.  6 months after this she finally went to get an xray in January 2025. More pain within her femur while planting and digging her garden May 2025 and MRI obtained.  Location of Pain: right sided hip and \"femur\" pain. She just started having pain on the left side in the last 2 weeks. Worsened by: walking, prolonged sitting (maybe?), she has not had a lot of pain, just describes discomfort  Better with: unsure   Treatments tried: rest/activity avoidance, heat, physical therapy (1 visits), and previous imaging (MRI 5/16/25 and xray 1/28/25)  Associated symptoms: discomfort is main symptom, denies all other symptoms     Interim History - June 13, 2025  Since last visit on 5/29/2025 patient has not been doing well. She states she feels a little pinch and is using a sitting pad is helpful. Otherwise she is doing okay.  She is using a cane, but unsure if it is helpful.   No interim injury.   She has lots of questions today.       Orthopedic/Surgical history: YES - Date: Patient has Osteoporosis, currently on treatment  Social History/Occupation: Very active around her home,  for her grandchildren       REVIEW OF SYSTEMS:  Review of Systems    OBJECTIVE:  There were no vitals taken for this visit.   General: healthy, alert and in no distress  Skin: no suspicious lesions or rash.  CV: distal perfusion intact   Resp: normal respiratory effort without conversational dyspnea   Psych: normal mood and affect  Gait: NORMAL  Neuro: Normal light sensory exam of lower extremity    Bilateral hip exam    Inspection:      no edema or ecchymosis in hip area    Tender:      right ischial tuberosity    Non Tender:      remainder of the hip area bilateral    ROM:     Full active and passive ROM  bilateral, " painful hamstring stretch R>>L    Strength:      flexion 5/5 bilateral       abduction 5/5 bilateral       adduction 5/5 bilateral    Sensation:      grossly intact in hip and thigh    Special Tests:      neg (-) ESTEBAN bilateral       neg (-) FADIR bilateral      RADIOLOGY:  Final results and radiologist's interpretation, available in the [facility] health record.  Images were reviewed with the patient in the office today.  My personal interpretation of the performed imaging:     AP pelvis XR views reviewed and compared to prior XRs 1/28/2025: no acute bony abnormality, significant degenerative change bilateral hips  - will follow official read    Reviewed MRI right femur - non displaced stress fracture involving the right pubic root, moderate hip joint degenerative changes    MR Femur Thigh Right wo Contrast  Result Date: 5/18/2025  EXAM: MR FEMUR THIGH RIGHT WITHOUT CONTRAST LOCATION: [facility] DATE: 05/16/2025 INDICATION: Patient with osteoporosis on bisphosphonate therapy developing right thigh pain. Please evaluate by MRI for findings worrisome for atypical femur fracture. COMPARISON: None. TECHNIQUE: Unenhanced. FINDINGS: JOINTS AND BONES: -Bone marrow edema within the right pubic root with subtle superimposed linear low signal intensity concerning for a nondisplaced stress fracture. No fracture or stress reaction involving the right femur. No evidence of osteonecrosis involving the femoral head. No concerning marrow replacing lesion. Moderate degenerative arthritis in the right hip and degenerative tearing of the acetabular labrum. Patellofemoral compartment chondromalacia with deep partial-thickness to full-thickness chondral loss  over the lateral patellar facet. TENDONS: -Chronic tendinopathy and low-grade partial tearing of the right proximal hamstrings conjoint tendon. The right distal gluteal, proximal hamstrings and iliopsoas tendons are intact. Mild tendinopathy of the distal  quadriceps tendon near the patellar attachment. MUSCLES AND SOFT TISSUES: -No muscle atrophy or edema. No evidence of an acute muscular injury. No soft tissue mass or fluid collection. Unremarkable neurovascular structures. Normal appearance of the imaged pelvic viscera.     IMPRESSION: 1.  Nondisplaced stress fracture involving the right pubic root. No fracture or stress reaction involving the right femur. 2.  No evidence of an acute myotendinous injury in the right thigh. 3.  Moderate degenerative arthritis of the right hip and degenerative tearing of the acetabular labrum. 4.  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

## 2025-06-24 ENCOUNTER — MYC MEDICAL ADVICE (OUTPATIENT)
Dept: INTERNAL MEDICINE | Facility: CLINIC | Age: 70
End: 2025-06-24

## 2025-06-25 ENCOUNTER — OFFICE VISIT (OUTPATIENT)
Dept: FAMILY MEDICINE | Facility: CLINIC | Age: 70
End: 2025-06-25
Payer: COMMERCIAL

## 2025-06-25 VITALS
TEMPERATURE: 97.9 F | WEIGHT: 164.8 LBS | RESPIRATION RATE: 16 BRPM | DIASTOLIC BLOOD PRESSURE: 74 MMHG | SYSTOLIC BLOOD PRESSURE: 122 MMHG | HEIGHT: 65 IN | HEART RATE: 54 BPM | OXYGEN SATURATION: 96 % | BODY MASS INDEX: 27.46 KG/M2

## 2025-06-25 DIAGNOSIS — S39.012D STRAIN OF MUSCLE, FASCIA AND TENDON OF LOWER BACK, SUBSEQUENT ENCOUNTER: ICD-10-CM

## 2025-06-25 DIAGNOSIS — R20.0 NUMBNESS IN BOTH LEGS: ICD-10-CM

## 2025-06-25 PROCEDURE — 3078F DIAST BP <80 MM HG: CPT | Performed by: FAMILY MEDICINE

## 2025-06-25 PROCEDURE — 3074F SYST BP LT 130 MM HG: CPT | Performed by: FAMILY MEDICINE

## 2025-06-25 PROCEDURE — G2211 COMPLEX E/M VISIT ADD ON: HCPCS | Performed by: FAMILY MEDICINE

## 2025-06-25 PROCEDURE — 99213 OFFICE O/P EST LOW 20 MIN: CPT | Performed by: FAMILY MEDICINE

## 2025-06-25 RX ORDER — CYCLOBENZAPRINE HCL 5 MG
5 TABLET ORAL 2 TIMES DAILY PRN
Qty: 30 TABLET | Refills: 1 | Status: SHIPPED | OUTPATIENT
Start: 2025-06-25

## 2025-06-25 ASSESSMENT — ENCOUNTER SYMPTOMS: BACK PAIN: 1

## 2025-06-25 NOTE — PROGRESS NOTES
Assessment & Plan     Strain of muscle, fascia and tendon of lower back, subsequent encounter  Recurrent low back strain occurs approximately once per year.  Similar occurrence yesterday when going from sitting to standing.  Flexeril helpful in the past, refill provided.  Scheduled for PT through sports med in July.  - cyclobenzaprine (FLEXERIL) 5 MG tablet; Take 1 tablet (5 mg) by mouth 2 times daily as needed for muscle spasms.    The longitudinal plan of care for the diagnosis(es)/condition(s) as documented were addressed during this visit. Due to the added complexity in care, I will continue to support Francisca in the subsequent management and with ongoing continuity of care.      Subjective   Francisca is a 69 year old, presenting for the following health issues:  Back Pain (Hx of chronic pain with low back pain, started when getting up from toilet yesterday, sais she has lower back pain episodes once a year, she said she usually gets prescribed a muscle relaxer (flexeril) and is currently out)        6/25/2025     8:44 AM   Additional Questions   Roomed by Shanell erazo CMA   Accompanied by self     Back Pain     History of Present Illness       Reason for visit:  Low back pain    She eats 4 or more servings of fruits and vegetables daily.She consumes 0 sweetened beverage(s) daily.She exercises with enough effort to increase her heart rate 10 to 19 minutes per day.  She exercises with enough effort to increase her heart rate 3 or less days per week.   She is taking medications regularly.      Mid low back pain began when getting off the toilet.  Gets similar pain about once a year, previously treated with Flexeril.  No longer has this prescription and interested in refill.  Has worked well for her in the past.  Has not experienced dizziness or confusion while on this.  Typically takes before bed.  Stress reaction of pelvis seen on MRI  Osteoporosis care through endo  No following with sports medicine in  "[place]  Scheduled for PT July  Uses cane when needed        Objective    /74   Pulse 54   Temp 97.9  F (36.6  C) (Oral)   Resp 16   Ht 1.642 m (5' 4.65\")   Wt 74.8 kg (164 lb 12.8 oz)   SpO2 96%   BMI 27.73 kg/m    Body mass index is 27.73 kg/m .  Physical Exam   GENERAL: alert and no distress  RESP: lungs clear to auscultation - no rales, rhonchi or wheezes  CV: regular rate, bradycardic, normal S1 S2, no S3 or S4, no murmur, click or rub, no peripheral edema  ABDOMEN: soft, nontender, no hepatosplenomegaly, no masses and bowel sounds normal  MS: Negative straight leg raise bilaterally.  4.5 strength bilateral hip flexion, 5 out of 5 strength knee flexion/extension  NEURO: Normal strength and tone, mentation intact and speech normal  PSYCH: mentation appears normal, affect normal/bright            Signed Electronically by: Filomena Renteria DO    "

## 2025-07-10 ASSESSMENT — ACTIVITIES OF DAILY LIVING (ADL)
SPORTS_HIGHEST_POTENTIAL_SCORE: 36
TWISTING/PIVOTING_ON_INVOLVED_LEG: NO DIFFICULTY AT ALL
HOW_WOULD_YOU_RATE_YOUR_CURRENT_LEVEL_OF_FUNCTION_DURING_YOUR_USUAL_ACTIVITIES_OF_DAILY_LIVING_FROM_0_TO_100_WITH_100_BEING_YOUR_LEVEL_OF_FUNCTION_PRIOR_TO_YOUR_HIP_PROBLEM_AND_0_BEING_THE_INABILITY_TO_PERFORM_ANY_OF_YOUR_USUAL_DAILY_ACTIVITIES?: 92
STEPPING UP AND DOWN CURBS: NO DIFFICULTY AT ALL
LANDING: SLIGHT DIFFICULTY
GETTING_INTO_AND_OUT_OF_AN_AVERAGE_CAR: SLIGHT DIFFICULTY
HOS_ADL_ITEM_SCORE_TOTAL: 49
ABILITY_TO_PARTICIPATE_IN_YOUR_DESIRED_SPORT_AS_LONG_AS_YOU_WOULD_LIKE: MODERATE DIFFICULTY
DEEP SQUATTING: MODERATE DIFFICULTY
HEAVY_WORK: SLIGHT DIFFICULTY
HOW_WOULD_YOU_RATE_YOUR_CURRENT_LEVEL_OF_FUNCTION?: NEARLY NORMAL
ROLLING OVER IN BED: SLIGHT DIFFICULTY
GOING_UP_1_FLIGHT_OF_STAIRS: NO DIFFICULTY AT ALL
SITTING_FOR_15_MINUTES: NO DIFFICULTY AT ALL
WALKING_INITIALLY: NO DIFFICULTY AT ALL
WALKING_FOR_APPROXIMATELY_10_MINUTES: NO DIFFICULTY AT ALL
SITTING FOR 15 MINUTES: NO DIFFICULTY AT ALL
STARTING_AND_STOPPING_QUICKLY: NO DIFFICULTY AT ALL
SPORTS_SCORE(%): 0
STANDING_FOR_15_MINUTES: NO DIFFICULTY AT ALL
STEPPING_UP_AND_DOWN_CURBS: NO DIFFICULTY AT ALL
TWISTING/PIVOTING ON INVOLVED LEG: NO DIFFICULTY AT ALL
HEAVY_WORK: SLIGHT DIFFICULTY
LIGHT_TO_MODERATE_WORK: NO DIFFICULTY AT ALL
GOING UP 1 FLIGHT OF STAIRS: NO DIFFICULTY AT ALL
ADL_SCORE(%): 0
RECREATIONAL ACTIVITIES: SLIGHT DIFFICULTY
GETTING INTO AND OUT OF AN AVERAGE CAR: SLIGHT DIFFICULTY
GETTING_INTO_AND_OUT_OF_A_BATHTUB: NO DIFFICULTY AT ALL
ABILITY_TO_PERFORM_ACTIVITY_WITH_YOUR_NORMAL_TECHNIQUE: SLIGHT DIFFICULTY
GOING_DOWN_1_FLIGHT_OF_STAIRS: SLIGHT DIFFICULTY
PUTTING_ON_SOCKS_AND_SHOES: NO DIFFICULTY AT ALL
ADL_TOTAL_ITEM_SCORE: 0
STANDING FOR 15 MINUTES: NO DIFFICULTY AT ALL
CUTTING/LATERAL_MOVEMENTS: SLIGHT DIFFICULTY
SPORTS_COUNT: 9
HOS_ADL_HIGHEST_POTENTIAL_SCORE: 56
WALKING_APPROXIMATELY_10_MINUTES: NO DIFFICULTY AT ALL
SPORTS_TOTAL_ITEM_SCORE: 0
WALKING_INITIALLY: NO DIFFICULTY AT ALL
HOS_ADL_SCORE(%): 87.5
LOW_IMPACT_ACTIVITIES_LIKE_FAST_WALKING: SLIGHT DIFFICULTY
DEEP_SQUATTING: MODERATE DIFFICULTY
ADL_COUNT: 17
RECREATIONAL_ACTIVITIES: SLIGHT DIFFICULTY
LIGHT_TO_MODERATE_WORK: NO DIFFICULTY AT ALL
PUTTING ON SOCKS AND SHOES: NO DIFFICULTY AT ALL
HOW_WOULD_YOU_RATE_YOUR_CURRENT_LEVEL_OF_FUNCTION_DURING_YOUR_USUAL_ACTIVITIES_OF_DAILY_LIVING_FROM_0_TO_100_WITH_100_BEING_YOUR_LEVEL_OF_FUNCTION_PRIOR_TO_YOUR_HIP_PROBLEM_AND_0_BEING_THE_INABILITY_TO_PERFORM_ANY_OF_YOUR_USUAL_DAILY_ACTIVITIES?: 92
GOING DOWN 1 FLIGHT OF STAIRS: SLIGHT DIFFICULTY
ROLLING_OVER_IN_BED: SLIGHT DIFFICULTY
JUMPING: SLIGHT DIFFICULTY
ADL_HIGHEST_POTENTIAL_SCORE: 68
GETTING_INTO_AND_OUT_OF_A_BATHTUB: NO DIFFICULTY AT ALL
PLEASE_INDICATE_YOR_PRIMARY_REASON_FOR_REFERRAL_TO_THERAPY:: HIP
HOW_WOULD_YOU_RATE_YOUR_CURRENT_LEVEL_OF_FUNCTION_DURING_YOUR_SPORTS_RELATED_ACTIVITIES_FROM_0_TO_100_WITH_100_BEING_YOUR_LEVEL_OF_FUNCTION_PRIOR_TO_YOUR_HIP_PROBLEM_AND_0_BEING_THE_INABILITY_TO_PERFORM_ANY_OF_YOUR_USUAL_DAILY_ACTIVITIES?: 90

## 2025-07-15 ENCOUNTER — THERAPY VISIT (OUTPATIENT)
Dept: PHYSICAL THERAPY | Facility: REHABILITATION | Age: 70
End: 2025-07-15
Attending: FAMILY MEDICINE
Payer: COMMERCIAL

## 2025-07-15 DIAGNOSIS — M76.891 HAMSTRING TENDINITIS OF RIGHT THIGH: ICD-10-CM

## 2025-07-15 DIAGNOSIS — G89.29 CHRONIC RIGHT HIP PAIN: ICD-10-CM

## 2025-07-15 DIAGNOSIS — M84.30XA STRESS REACTION OF BONE: ICD-10-CM

## 2025-07-15 DIAGNOSIS — M70.71 ISCHIAL BURSITIS OF RIGHT SIDE: ICD-10-CM

## 2025-07-15 DIAGNOSIS — M16.11 PRIMARY OSTEOARTHRITIS OF RIGHT HIP: ICD-10-CM

## 2025-07-15 DIAGNOSIS — M25.551 CHRONIC RIGHT HIP PAIN: ICD-10-CM

## 2025-07-15 PROCEDURE — 97161 PT EVAL LOW COMPLEX 20 MIN: CPT | Mod: GP

## 2025-07-15 PROCEDURE — 97110 THERAPEUTIC EXERCISES: CPT | Mod: GP

## 2025-07-15 NOTE — PROGRESS NOTES
PHYSICAL THERAPY EVALUATION  Type of Visit: Evaluation       Fall Risk Screen:  Have you fallen 2 or more times in the past year?: No  Have you fallen and had an injury in the past year?: No    Subjective         Presenting condition or subjective complaint: stress reaction of the superior pubic ramus  Date of onset: 06/13/25    Relevant medical history: Concussions; Migraines or headaches; Osteoarthritis; Osteoporosis   Dates & types of surgery: appendectomy years ago    Prior diagnostic imaging/testing results: MRI; X-ray     MRI  IMPRESSION:  1.  Both hips are negative for fracture or avascular necrosis.  2.  Degenerative change both hip joints with superior joint space narrowing. Findings are fairly symmetric.  3.  Chronic appearing tearing of the anterior labrum.  4.  Signal abnormality at the junction of right acetabulum and right superior pubic ramus. This could be secondary to stress reaction. It is unchanged from the previous examination. No cortical step-off is identified.  5.  No additional foci of bone signal abnormality.  6.  Mild bilateral gluteal tendon tendinopathy.  7.  Exam otherwise negative.  Prior therapy history for the same diagnosis, illness or injury: No      Prior Level of Function  IND    Living Environment  Social support: With a significant other or spouse   Type of home: House   Stairs to enter the home: Yes 1 Is there a railing: No     Ramp: No   Stairs inside the home: Yes 12 Is there a railing: Yes     Help at home: None  Equipment owned: Straight Cane     Employment: Not Applicable    Hobbies/Interests: reading, walking, cooking, love to be with family and grandchildren    Patient goals for therapy: walk for 30 minutes or more for my daily exercise    Pain assessment: Pain present     Objective   HIP EVALUATION  PAIN: Pain Level at Rest: 0/10  Pain Level with Use: 5/10  Pain Location: anterior groin  Pain Quality: Aching, Dull, and occasional sharp   Pain Frequency: more movement    Pain is Worst: activity based   Pain is Exacerbated By: walking, gardening, bending   Pain is Relieved By: rest, flexeril,   Pain Progression: Improved  INTEGUMENTARY (edema, incisions):   POSTURE: good upright sitting posture with use of cushion pillow for ischial bursitis   GAIT:   Weightbearing Status: WBAT  Assistive Device(s): None  Gait Deviations: WFL    ROM:   (Degrees) Left AROM Left PROM  Right AROM Right PROM   Hip Flexion WFL  100 deg, discomfort   100 deg, discomfort    Hip Extension Neutral B Neutral B     Hip Abduction Min loss, painful Min loss, painful Min loss, painful Min loss, painful   Hip Adduction       Hip Internal Rotation WFL B WFL B     Hip External Rotation WFL B WFL B     Knee Flexion WFL B WFL B     Knee Extension WFL B WFL B     Lumbar Side glide     Lumbar Flexion    Lumbar Extension    Pain: pain/discomfort with ESTEBAN, FADIR, end range flexion   End feel:     PELVIC/SI SCREEN:   STRENGTH: WFL  LE FLEXIBILITY: limited into hip abduction   SPECIAL TESTS:    Left Right   ESTEBAN Positive Positive   FADIR/Labrum/PADDY Positive Positive   Femoral Nerve     Marianela's     Piriformis Negative  Negative    Quadrant Testing     SLR Negative  Negative    Slump Negative  Negative    Stork with Extension     Peter     Pelvic compression  Positive Positive     FUNCTIONAL TESTS: STS 19 sec without UE support, IND transfers supine  PALPATION: TTP R pubic ramus  JOINT MOBILITY: hypomobile hips B    Assessment & Plan   CLINICAL IMPRESSIONS  Medical Diagnosis: M84.30XA (ICD-10-CM) - Stress reaction of bone  M16.11 (ICD-10-CM) - Primary osteoarthritis of right hip  M70.71 (ICD-10-CM) - Ischial bursitis of right side  M25.551, G89.29 (ICD-10-CM) - Chronic right hip pain  M76.891 (ICD-10-CM) - Hamstring tendinitis of right thigh    Treatment Diagnosis: left hip pain with mobility and muscle power deficits   Impression/Assessment: Patient is a 70 year old female with R groin pain s/p stress reaction  complaints.  The following significant findings have been identified: Pain, Decreased ROM/flexibility, Decreased joint mobility, Decreased strength, Impaired balance, Decreased proprioception, Impaired gait, Impaired muscle performance, Decreased activity tolerance, and Impaired posture. These impairments interfere with their ability to perform self care tasks, work tasks, recreational activities, household chores, driving , household mobility, and community mobility as compared to previous level of function. Pt returns to PT with R hip and groin discomfort, was previously in for PT regarding similar symtoms. New updates to MRI suggestive of stress reaction and osteopenia in hips. Updated referal and reeval - is now cleared to return to more activity and WB progression, walking. Pt is still having discomfort with walking, squats, gardening. Upon eval qould benefit from global hip strengthening to improve function.     Clinical Decision Making (Complexity):  Clinical Presentation: Stable/Uncomplicated  Clinical Presentation Rationale: based on medical and personal factors listed in PT evaluation  Clinical Decision Making (Complexity): Low complexity    PLAN OF CARE  Treatment Interventions:  Interventions: Gait Training, Manual Therapy, Neuromuscular Re-education, Therapeutic Activity, Therapeutic Exercise, Self-Care/Home Management    Long Term Goals     PT Goal 1  Goal Identifier: HEP  Goal Description: Pt will be 50% complient with HEP in order to progress towards functional and personal goals.  Target Date: 10/07/25  PT Goal 2  Goal Identifier: STS  Goal Description: Pt will decrease 5 x STS by 5 seconds or more to be able to demonstrate good functional strength for transfers  Rationale: to maximize safety and independence with performance of ADLs and functional tasks;to maximize safety and independence within the home;to maximize safety and independence within the community  Target Date: 10/07/25  PT Goal  3  Goal Identifier: Walking  Goal Description: Pt will describe being able to walk for at least 30 + minswithout c/o increase symptoms in order to retun to PLOF.  Rationale: to maximize safety and independence within the home;to maximize safety and independence within the community  Target Date: 10/07/25  PT Goal 4  Goal Identifier: Gardening  Goal Description: Pt will be able to garden for at least 30 min without c/o increased symptoms in order to return to PLOF  Rationale: to maximize safety and independence with performance of ADLs and functional tasks  Target Date: 08/04/25      Frequency of Treatment: every other week  Duration of Treatment: 12 weeks    Recommended Referrals to Other Professionals:   Education Assessment:   Learner/Method: Patient  Education Comments: Education provided on HEP    Risks and benefits of evaluation/treatment have been explained.   Patient/Family/caregiver agrees with Plan of Care.     Evaluation Time:     PT Eval, Low Complexity Minutes (35808): 30       Signing Clinician: ALYSSA BELLAMY, PT        [facility]                                                                                   OUTPATIENT PHYSICAL THERAPY      PLAN OF TREATMENT FOR OUTPATIENT REHABILITATION   Patient's Last Name, First Name, Francisca Leiva YOB: 1955   Provider's Name   [facility]   Medical Record No.  [id number]     Onset Date: 06/13/25  Start of Care Date: 07/15/25     Medical Diagnosis:  M84.30XA (ICD-10-CM) - Stress reaction of bone  M16.11 (ICD-10-CM) - Primary osteoarthritis of right hip  M70.71 (ICD-10-CM) - Ischial bursitis of right side  M25.551, G89.29 (ICD-10-CM) - Chronic right hip pain  M76.891 (ICD-10-CM) - Hamstring tendinitis of right thigh      PT Treatment Diagnosis:  left hip pain with mobility and muscle power deficits Plan of Treatment  Frequency/Duration: every other week/ 12 weeks    Certification  date from 07/15/25 to 10/07/25         See note for plan of treatment details and functional goals     ALYSSA BELLAMY, PT                         I CERTIFY THE NEED FOR THESE SERVICES FURNISHED UNDER        THIS PLAN OF TREATMENT AND WHILE UNDER MY CARE     (Physician attestation of this document indicates review and certification of the therapy plan).              Referring Provider:  Asael Bautista    Initial Assessment  See Epic Evaluation- Start of Care Date: 07/15/25

## 2025-07-26 ENCOUNTER — HEALTH MAINTENANCE LETTER (OUTPATIENT)
Age: 70
End: 2025-07-26

## 2025-08-11 ENCOUNTER — INFUSION THERAPY VISIT (OUTPATIENT)
Dept: INFUSION THERAPY | Facility: HOSPITAL | Age: 70
End: 2025-08-11
Attending: OBSTETRICS & GYNECOLOGY
Payer: COMMERCIAL

## 2025-08-11 ENCOUNTER — LAB (OUTPATIENT)
Dept: INFUSION THERAPY | Facility: HOSPITAL | Age: 70
End: 2025-08-11
Attending: INTERNAL MEDICINE
Payer: COMMERCIAL

## 2025-08-11 VITALS
HEIGHT: 65 IN | TEMPERATURE: 97.5 F | OXYGEN SATURATION: 98 % | HEART RATE: 65 BPM | BODY MASS INDEX: 27.55 KG/M2 | SYSTOLIC BLOOD PRESSURE: 102 MMHG | WEIGHT: 165.34 LBS | DIASTOLIC BLOOD PRESSURE: 58 MMHG | RESPIRATION RATE: 16 BRPM

## 2025-08-11 DIAGNOSIS — M81.0 OSTEOPOROSIS, UNSPECIFIED OSTEOPOROSIS TYPE, UNSPECIFIED PATHOLOGICAL FRACTURE PRESENCE: Primary | ICD-10-CM

## 2025-08-11 LAB
ALBUMIN SERPL BCG-MCNC: 4.3 G/DL (ref 3.5–5.2)
CALCIUM SERPL-MCNC: 9.7 MG/DL (ref 8.8–10.4)
CREAT SERPL-MCNC: 0.88 MG/DL (ref 0.51–0.95)
EGFRCR SERPLBLD CKD-EPI 2021: 69 ML/MIN/1.73M2

## 2025-08-11 PROCEDURE — 250N000011 HC RX IP 250 OP 636: Performed by: INTERNAL MEDICINE

## 2025-08-11 PROCEDURE — 82040 ASSAY OF SERUM ALBUMIN: CPT | Performed by: INTERNAL MEDICINE

## 2025-08-11 PROCEDURE — 82310 ASSAY OF CALCIUM: CPT | Performed by: INTERNAL MEDICINE

## 2025-08-11 PROCEDURE — 82565 ASSAY OF CREATININE: CPT | Performed by: INTERNAL MEDICINE

## 2025-08-11 PROCEDURE — 96365 THER/PROPH/DIAG IV INF INIT: CPT

## 2025-08-11 PROCEDURE — 36415 COLL VENOUS BLD VENIPUNCTURE: CPT | Performed by: INTERNAL MEDICINE

## 2025-08-11 RX ORDER — DIPHENHYDRAMINE HYDROCHLORIDE 50 MG/ML
50 INJECTION, SOLUTION INTRAMUSCULAR; INTRAVENOUS
Status: CANCELLED
Start: 2026-08-11

## 2025-08-11 RX ORDER — DIPHENHYDRAMINE HYDROCHLORIDE 50 MG/ML
25 INJECTION, SOLUTION INTRAMUSCULAR; INTRAVENOUS
Start: 2026-08-11

## 2025-08-11 RX ORDER — ZOLEDRONIC ACID 0.05 MG/ML
5 INJECTION, SOLUTION INTRAVENOUS ONCE
Start: 2026-08-11

## 2025-08-11 RX ORDER — HEPARIN SODIUM,PORCINE 10 UNIT/ML
5-20 VIAL (ML) INTRAVENOUS DAILY PRN
OUTPATIENT
Start: 2026-08-11

## 2025-08-11 RX ORDER — ALBUTEROL SULFATE 90 UG/1
1-2 INHALANT RESPIRATORY (INHALATION)
Status: CANCELLED
Start: 2026-08-11

## 2025-08-11 RX ORDER — DIPHENHYDRAMINE HYDROCHLORIDE 50 MG/ML
50 INJECTION, SOLUTION INTRAMUSCULAR; INTRAVENOUS
Start: 2026-08-11

## 2025-08-11 RX ORDER — ALBUTEROL SULFATE 0.83 MG/ML
2.5 SOLUTION RESPIRATORY (INHALATION)
OUTPATIENT
Start: 2026-08-11

## 2025-08-11 RX ORDER — EPINEPHRINE 1 MG/ML
0.3 INJECTION, SOLUTION INTRAMUSCULAR; SUBCUTANEOUS EVERY 5 MIN PRN
Status: CANCELLED | OUTPATIENT
Start: 2026-08-11

## 2025-08-11 RX ORDER — ZOLEDRONIC ACID 0.05 MG/ML
5 INJECTION, SOLUTION INTRAVENOUS ONCE
Status: CANCELLED
Start: 2026-08-11

## 2025-08-11 RX ORDER — DIPHENHYDRAMINE HYDROCHLORIDE 50 MG/ML
25 INJECTION, SOLUTION INTRAMUSCULAR; INTRAVENOUS
Status: CANCELLED
Start: 2026-08-11

## 2025-08-11 RX ORDER — ALBUTEROL SULFATE 90 UG/1
1-2 INHALANT RESPIRATORY (INHALATION)
Start: 2026-08-11

## 2025-08-11 RX ORDER — ZOLEDRONIC ACID 0.05 MG/ML
5 INJECTION, SOLUTION INTRAVENOUS ONCE
Status: COMPLETED | OUTPATIENT
Start: 2025-08-11 | End: 2025-08-11

## 2025-08-11 RX ORDER — HEPARIN SODIUM (PORCINE) LOCK FLUSH IV SOLN 100 UNIT/ML 100 UNIT/ML
5 SOLUTION INTRAVENOUS
OUTPATIENT
Start: 2026-08-11

## 2025-08-11 RX ORDER — ALBUTEROL SULFATE 0.83 MG/ML
2.5 SOLUTION RESPIRATORY (INHALATION)
Status: CANCELLED | OUTPATIENT
Start: 2026-08-11

## 2025-08-11 RX ORDER — EPINEPHRINE 1 MG/ML
0.3 INJECTION, SOLUTION INTRAMUSCULAR; SUBCUTANEOUS EVERY 5 MIN PRN
OUTPATIENT
Start: 2026-08-11

## 2025-08-11 RX ORDER — HEPARIN SODIUM (PORCINE) LOCK FLUSH IV SOLN 100 UNIT/ML 100 UNIT/ML
5 SOLUTION INTRAVENOUS
Status: CANCELLED | OUTPATIENT
Start: 2026-08-11

## 2025-08-11 RX ORDER — HEPARIN SODIUM,PORCINE 10 UNIT/ML
5-20 VIAL (ML) INTRAVENOUS DAILY PRN
Status: CANCELLED | OUTPATIENT
Start: 2026-08-11

## 2025-08-11 RX ADMIN — ZOLEDRONIC ACID 5 MG: 5 INJECTION, SOLUTION INTRAVENOUS at 12:35

## 2025-08-18 ENCOUNTER — OFFICE VISIT (OUTPATIENT)
Dept: PODIATRY | Facility: CLINIC | Age: 70
End: 2025-08-18
Attending: INTERNAL MEDICINE
Payer: COMMERCIAL

## 2025-08-18 ENCOUNTER — HOSPITAL ENCOUNTER (OUTPATIENT)
Dept: GENERAL RADIOLOGY | Facility: HOSPITAL | Age: 70
Discharge: HOME | End: 2025-08-18
Admitting: STUDENT IN AN ORGANIZED HEALTH CARE EDUCATION/TRAINING PROGRAM
Payer: COMMERCIAL

## 2025-08-18 DIAGNOSIS — M79.671 FOOT PAIN, RIGHT: ICD-10-CM

## 2025-08-18 DIAGNOSIS — M72.2 PLANTAR FASCIITIS OF RIGHT FOOT: Primary | ICD-10-CM

## 2025-08-18 DIAGNOSIS — M24.571 EQUINUS CONTRACTURE OF RIGHT ANKLE: ICD-10-CM

## 2025-08-18 PROCEDURE — 73630 X-RAY EXAM OF FOOT: CPT | Mod: RT

## 2025-08-18 PROCEDURE — 99204 OFFICE O/P NEW MOD 45 MIN: CPT | Performed by: STUDENT IN AN ORGANIZED HEALTH CARE EDUCATION/TRAINING PROGRAM

## 2025-08-18 PROCEDURE — 1125F AMNT PAIN NOTED PAIN PRSNT: CPT | Performed by: STUDENT IN AN ORGANIZED HEALTH CARE EDUCATION/TRAINING PROGRAM

## 2025-08-18 PROCEDURE — 73630 X-RAY EXAM OF FOOT: CPT | Mod: 26 | Performed by: STUDENT IN AN ORGANIZED HEALTH CARE EDUCATION/TRAINING PROGRAM

## 2025-08-18 ASSESSMENT — PAIN SCALES - GENERAL: PAINLEVEL_OUTOF10: MODERATE PAIN (5)

## 2025-08-20 ASSESSMENT — ACTIVITIES OF DAILY LIVING (ADL)
PERFORMING_LIGHT_ACTIVITIES_AROUND_YOUR_HOME: NO DIFFICULTY
RUNNING_ON_UNEVEN_GROUND: QUITE A BIT OF DIFFICULTY
SHOPPING: MODERATE DIFFICULTY
PUTTING_ON_YOUR_SHOES_OR_SOCKS: NO DIFFICULTY
RUNNING_ON_EVEN_GROUND: QUITE A BIT OF DIFFICULTY
WALKING_A_MILE: MODERATE DIFFICULTY
SITTING_FOR_1_HOUR: NO DIFFICULTY
SQUATTING: A LITTLE BIT OF DIFFICULTY
LEFS_SCORE(%): 0
GETTING_INTO_OR_OUT_OF_A_CAR: NO DIFFICULTY
LEFS_RAW_SCORE: 0
PERFORMING_HEAVY_ACTIVITIES_AROUND_YOUR_HOME: A LITTLE BIT OF DIFFICULTY
PLEASE_INDICATE_YOR_PRIMARY_REASON_FOR_REFERRAL_TO_THERAPY:: FOOT AND/OR ANKLE
YOUR_USUAL_HOBBIES,_RECREATIONAL_OR_SPORTING_ACTIVITIES: A LITTLE BIT OF DIFFICULTY
GETTING_INTO_AND_OUT_OF_A_BATH: NO DIFFICULTY
LIFTING_AN_OBJECT,_LIKE_A_BAG_OF_GROCERIES_FROM_THE_FLOOR: NO DIFFICULTY
ANY_OF_YOUR_USUAL_WORK,_HOUSEWORK_OR_SCHOOL_ACTIVITIES: NO DIFFICULTY
MAKING_SHARP_TURNS_WHILE_RUNNING_FAST: QUITE A BIT OF DIFFICULTY
GOING_UP_OR_DOWN_10_STAIRS: NO DIFFICULTY
STANDING_FOR_1_HOUR: A LITTLE BIT OF DIFFICULTY
WALKING_BETWEEN_ROOMS: NO DIFFICULTY
WALKING_2_BLOCKS: A LITTLE BIT OF DIFFICULTY
ROLLING_OVER_IN_BED: NO DIFFICULTY

## 2025-08-21 ENCOUNTER — THERAPY VISIT (OUTPATIENT)
Dept: PHYSICAL THERAPY | Facility: REHABILITATION | Age: 70
End: 2025-08-21
Payer: COMMERCIAL

## 2025-08-21 DIAGNOSIS — G89.29 CHRONIC RIGHT HIP PAIN: ICD-10-CM

## 2025-08-21 DIAGNOSIS — M25.551 CHRONIC RIGHT HIP PAIN: ICD-10-CM

## 2025-08-21 DIAGNOSIS — M24.571 EQUINUS CONTRACTURE OF RIGHT ANKLE: ICD-10-CM

## 2025-08-21 DIAGNOSIS — M79.671 FOOT PAIN, RIGHT: ICD-10-CM

## 2025-08-21 DIAGNOSIS — M70.71 ISCHIAL BURSITIS OF RIGHT SIDE: ICD-10-CM

## 2025-08-21 DIAGNOSIS — M16.11 PRIMARY OSTEOARTHRITIS OF RIGHT HIP: ICD-10-CM

## 2025-08-21 DIAGNOSIS — M84.30XA STRESS REACTION OF BONE: Primary | ICD-10-CM

## 2025-08-21 DIAGNOSIS — M76.891 HAMSTRING TENDINITIS OF RIGHT THIGH: ICD-10-CM

## 2025-08-21 DIAGNOSIS — M72.2 PLANTAR FASCIITIS OF RIGHT FOOT: ICD-10-CM
